# Patient Record
Sex: FEMALE | Race: WHITE | Employment: OTHER | URBAN - METROPOLITAN AREA
[De-identification: names, ages, dates, MRNs, and addresses within clinical notes are randomized per-mention and may not be internally consistent; named-entity substitution may affect disease eponyms.]

---

## 2017-03-23 ENCOUNTER — HOSPITAL ENCOUNTER (OUTPATIENT)
Dept: MAMMOGRAPHY | Age: 77
Discharge: HOME | End: 2017-03-23
Attending: INTERNAL MEDICINE
Payer: MEDICARE

## 2017-03-23 DIAGNOSIS — Z12.31 VISIT FOR SCREENING MAMMOGRAM: ICD-10-CM

## 2017-03-23 PROCEDURE — 77067 SCR MAMMO BI INCL CAD: CPT

## 2017-08-03 PROBLEM — H40.9 GLAUCOMA: Status: ACTIVE | Noted: 2017-08-03

## 2017-08-03 PROBLEM — M16.10 ARTHRITIS, HIP: Status: ACTIVE | Noted: 2017-08-03

## 2017-08-03 PROBLEM — R51.9 HEADACHE, ACUTE: Status: ACTIVE | Noted: 2017-08-03

## 2017-08-03 PROBLEM — L40.9 PSORIASIS: Status: ACTIVE | Noted: 2017-08-03

## 2017-08-03 PROBLEM — K58.9 IBS (IRRITABLE BOWEL SYNDROME): Status: ACTIVE | Noted: 2017-08-03

## 2017-08-03 PROBLEM — R79.89 ELEVATED LIVER FUNCTION TESTS: Status: ACTIVE | Noted: 2017-08-03

## 2017-08-03 PROBLEM — M85.80 BONE LOSS: Status: ACTIVE | Noted: 2017-08-03

## 2017-08-03 PROBLEM — K92.1 MELENA: Status: ACTIVE | Noted: 2017-08-03

## 2017-08-03 PROBLEM — L50.9 URTICARIA: Status: ACTIVE | Noted: 2017-08-03

## 2017-08-03 PROBLEM — F41.9 ANXIETY: Status: ACTIVE | Noted: 2017-08-03

## 2017-08-03 PROBLEM — Z78.0 POST-MENOPAUSAL: Status: ACTIVE | Noted: 2017-08-03

## 2017-08-03 PROBLEM — E03.9 HYPOTHYROID: Status: ACTIVE | Noted: 2017-08-03

## 2017-08-03 PROBLEM — Z86.010 HISTORY OF COLONOSCOPY WITH POLYPECTOMY: Status: ACTIVE | Noted: 2017-08-03

## 2017-08-03 PROBLEM — G47.00 INSOMNIA: Status: ACTIVE | Noted: 2017-08-03

## 2017-08-03 PROBLEM — M54.30 SCIATICA: Status: ACTIVE | Noted: 2017-08-03

## 2017-08-03 PROBLEM — Z98.890 HISTORY OF COLONOSCOPY WITH POLYPECTOMY: Status: ACTIVE | Noted: 2017-08-03

## 2017-08-03 PROBLEM — Z00.00 ANNUAL PHYSICAL EXAM: Status: ACTIVE | Noted: 2017-08-03

## 2017-08-03 PROBLEM — M81.0 OSTEOPOROSIS: Status: ACTIVE | Noted: 2017-08-03

## 2017-08-03 PROBLEM — F41.1 GAD (GENERALIZED ANXIETY DISORDER): Status: ACTIVE | Noted: 2017-08-03

## 2017-08-03 PROBLEM — M47.816 DEGENERATIVE ARTHRITIS OF LUMBAR SPINE: Status: ACTIVE | Noted: 2017-08-03

## 2017-08-03 PROBLEM — M99.01 SOMATIC DYSFUNCTION OF CERVICAL REGION: Status: ACTIVE | Noted: 2017-08-03

## 2017-08-03 PROBLEM — Z79.60 LONG-TERM USE OF IMMUNOSUPPRESSANT MEDICATION: Status: ACTIVE | Noted: 2017-08-03

## 2017-08-03 PROBLEM — T88.7XXA MEDICATION SIDE EFFECT: Status: ACTIVE | Noted: 2017-08-03

## 2017-08-03 PROBLEM — H10.829 ROSACEA BLEPHAROCONJUNCTIVITIS: Status: ACTIVE | Noted: 2017-08-03

## 2017-08-03 PROBLEM — M62.838 MUSCLE SPASM: Status: ACTIVE | Noted: 2017-08-03

## 2017-08-03 PROBLEM — S80.02XA CONTUSION OF LEFT KNEE: Status: ACTIVE | Noted: 2017-08-03

## 2017-08-03 PROBLEM — M87.059 AVASCULAR NECROSIS OF FEMORAL HEAD (HCC): Status: ACTIVE | Noted: 2017-08-03

## 2017-08-03 PROBLEM — D50.9 IDA (IRON DEFICIENCY ANEMIA): Status: ACTIVE | Noted: 2017-08-03

## 2017-08-03 PROBLEM — Z85.42 HISTORY OF ENDOMETRIAL CANCER: Status: ACTIVE | Noted: 2017-08-03

## 2017-08-03 PROBLEM — B37.0 THRUSH: Status: ACTIVE | Noted: 2017-08-03

## 2017-08-03 PROBLEM — M47.812 SPONDYLOSIS OF CERVICAL REGION WITHOUT MYELOPATHY OR RADICULOPATHY: Status: ACTIVE | Noted: 2017-08-03

## 2017-08-03 PROBLEM — R21 RASH: Status: ACTIVE | Noted: 2017-08-03

## 2017-08-03 PROBLEM — E78.5 HYPERLIPIDEMIA: Status: ACTIVE | Noted: 2017-08-03

## 2017-08-03 PROBLEM — M25.50 ARTHRALGIA: Status: ACTIVE | Noted: 2017-08-03

## 2017-08-03 PROBLEM — R82.81 PYURIA, STERILE: Status: ACTIVE | Noted: 2017-08-03

## 2017-08-03 RX ORDER — CHOLECALCIFEROL (VITAMIN D3) 125 MCG
CAPSULE ORAL
COMMUNITY
End: 2019-06-11

## 2017-08-03 RX ORDER — CLOTRIMAZOLE 10 MG/1
10 LOZENGE ORAL; TOPICAL
COMMUNITY
End: 2017-10-09 | Stop reason: SDUPTHER

## 2017-08-03 RX ORDER — TRIAMCINOLONE ACETONIDE 1 MG/G
CREAM TOPICAL 2 TIMES DAILY
COMMUNITY
End: 2019-06-11

## 2017-08-03 RX ORDER — DICYCLOMINE HYDROCHLORIDE 10 MG/1
10 CAPSULE ORAL
COMMUNITY
End: 2018-01-13 | Stop reason: SDUPTHER

## 2017-08-03 RX ORDER — NAPROXEN SODIUM 220 MG
220 TABLET ORAL 2 TIMES DAILY WITH MEALS
COMMUNITY
End: 2021-08-11

## 2017-08-08 ENCOUNTER — LAB ONLY (OUTPATIENT)
Dept: INTERNAL MEDICINE CLINIC | Age: 77
End: 2017-08-08

## 2017-08-08 DIAGNOSIS — L40.52 PSORIATIC ARTHRITIS MUTILANS (HCC): Primary | ICD-10-CM

## 2017-08-08 LAB
ALBUMIN SERPL-MCNC: 3.5 G/DL (ref 3.9–5.4)
ALKALINE PHOS POC: 79 U/L (ref 38–126)
ALT SERPL-CCNC: 45 U/L (ref 9–52)
AST SERPL-CCNC: 41 U/L (ref 14–36)
BUN BLD-MCNC: 22 MG/DL (ref 7–17)
CALCIUM BLD-MCNC: 9.4 MG/DL (ref 8.4–10.2)
CHLORIDE BLD-SCNC: 108 MMOL/L (ref 98–107)
CO2 POC: 25 MMOL/L (ref 22–32)
CREAT BLD-MCNC: 0.8 MG/DL (ref 0.7–1.2)
EGFR (POC): 70.1
GLUCOSE POC: 78 MG/DL (ref 65–105)
GRAN# POC: 3.8 K/UL (ref 2–7.8)
GRAN% POC: 71.6 % (ref 37–92)
HCT VFR BLD CALC: 41.3 % (ref 37–51)
HGB BLD-MCNC: 13.9 G/DL (ref 12–18)
LY# POC: 1.2 K/UL (ref 0.6–4.1)
LY% POC: 24.2 % (ref 10–58.5)
MCH RBC QN: 32 PG (ref 26–32)
MCHC RBC-ENTMCNC: 33.5 G/DL (ref 30–36)
MCV RBC: 95 FL (ref 80–97)
MID #, POC: 0.2 K/UL (ref 0–1.8)
MID% POC: 4.2 % (ref 0.1–24)
PLATELET # BLD: 202 K/UL (ref 140–440)
POTASSIUM SERPL-SCNC: 4.5 MMOL/L (ref 3.6–5)
PROT SERPL-MCNC: 5.8 G/DL (ref 6.3–8.2)
RBC # BLD: 4.33 M/UL (ref 4.2–6.3)
SODIUM SERPL-SCNC: 144 MMOL/L (ref 137–145)
TOTAL BILIRUBIN POC: 0.7 MG/DL (ref 0.2–1.3)
WBC # BLD: 5.2 K/UL (ref 4.1–10.9)

## 2017-08-24 ENCOUNTER — OFFICE VISIT (OUTPATIENT)
Dept: INTERNAL MEDICINE CLINIC | Age: 77
End: 2017-08-24

## 2017-08-24 VITALS — SYSTOLIC BLOOD PRESSURE: 120 MMHG | TEMPERATURE: 98 F | DIASTOLIC BLOOD PRESSURE: 70 MMHG

## 2017-08-24 DIAGNOSIS — E78.5 DYSLIPIDEMIA: ICD-10-CM

## 2017-08-24 DIAGNOSIS — H10.829 ROSACEA BLEPHAROCONJUNCTIVITIS: Primary | ICD-10-CM

## 2017-08-24 DIAGNOSIS — H60.8X3 CHRONIC ECZEMATOUS OTITIS EXTERNA OF BOTH EARS: ICD-10-CM

## 2017-08-24 PROBLEM — B37.31 MONILIAL VAGINITIS: Status: ACTIVE | Noted: 2017-08-24

## 2017-08-24 RX ORDER — PREDNISOLONE ACETATE 10 MG/ML
1 SUSPENSION/ DROPS OPHTHALMIC 4 TIMES DAILY
Qty: 10 ML | Refills: 6 | Status: SHIPPED | OUTPATIENT
Start: 2017-08-24 | End: 2019-06-11

## 2017-08-24 RX ORDER — ATORVASTATIN CALCIUM 40 MG/1
40 TABLET, FILM COATED ORAL DAILY
Qty: 90 TAB | Refills: 3 | Status: SHIPPED | OUTPATIENT
Start: 2017-08-24 | End: 2018-08-27 | Stop reason: SDUPTHER

## 2017-08-24 RX ORDER — DOXYCYCLINE 100 MG/1
100 CAPSULE ORAL 2 TIMES DAILY
Qty: 30 CAP | Refills: 0 | Status: SHIPPED | OUTPATIENT
Start: 2017-08-24 | End: 2019-01-10 | Stop reason: ALTCHOICE

## 2017-08-24 RX ORDER — FLUCONAZOLE 150 MG/1
150 TABLET ORAL DAILY
Qty: 2 TAB | Refills: 3 | Status: SHIPPED | OUTPATIENT
Start: 2017-08-24 | End: 2017-08-26

## 2017-08-24 RX ORDER — NEOMYCIN SULFATE, POLYMYXIN B SULFATE AND HYDROCORTISONE 10; 3.5; 1 MG/ML; MG/ML; [USP'U]/ML
4 SUSPENSION/ DROPS AURICULAR (OTIC) 3 TIMES DAILY
Qty: 10 ML | Refills: 6 | Status: SHIPPED | OUTPATIENT
Start: 2017-08-24 | End: 2019-06-11

## 2017-08-24 NOTE — PROGRESS NOTES
Nadine comes to the office today complaining of:  1. Itching in ears. 2. Itching in and around her eyes. 3. Itching all over. She does not have a rash. The majority of her symptoms are the ears and the eyes. Physical Examination:  GENERAL:  T: 98.  BP: 120/70. HEENT:  Ears show erythema and scaling in the external auditory canals consistent with chronic external otitis. Eyes show erythematous scaling rash in the periorbital areas and mild conjunctival injection. Impression:  1. Chronic eczematous external otitis. 2. Rosacea. 3. Blepharoconjunctivitis. Plan:  1. Cortaid cream around the eyes t.i.d. prn.  2. Cortisporin otic solution drops in the ear prn. 3. Pred Forte drops in the eyes prn.  4. Doxycycline 100 mg daily for 30 days. 5. Follow up if unimproved, otherwise as previously scheduled.

## 2017-10-09 ENCOUNTER — OFFICE VISIT (OUTPATIENT)
Dept: INTERNAL MEDICINE CLINIC | Age: 77
End: 2017-10-09

## 2017-10-09 VITALS
BODY MASS INDEX: 24.74 KG/M2 | TEMPERATURE: 97.8 F | RESPIRATION RATE: 14 BRPM | DIASTOLIC BLOOD PRESSURE: 72 MMHG | WEIGHT: 126 LBS | SYSTOLIC BLOOD PRESSURE: 134 MMHG | HEIGHT: 60 IN | HEART RATE: 66 BPM | OXYGEN SATURATION: 97 %

## 2017-10-09 DIAGNOSIS — H10.829 ROSACEA BLEPHAROCONJUNCTIVITIS: Primary | ICD-10-CM

## 2017-10-09 DIAGNOSIS — Z23 ENCOUNTER FOR IMMUNIZATION: ICD-10-CM

## 2017-10-09 DIAGNOSIS — H60.8X3 CHRONIC ECZEMATOUS OTITIS EXTERNA OF BOTH EARS: ICD-10-CM

## 2017-10-09 DIAGNOSIS — L50.9 URTICARIA: ICD-10-CM

## 2017-10-09 DIAGNOSIS — B37.0 THRUSH: ICD-10-CM

## 2017-10-09 RX ORDER — CLOTRIMAZOLE 10 MG/1
10 LOZENGE ORAL; TOPICAL
Qty: 70 TAB | Refills: 2 | Status: SHIPPED | OUTPATIENT
Start: 2017-10-09 | End: 2019-06-11

## 2017-10-09 NOTE — PROGRESS NOTES
Chief Complaint   Patient presents with    Skin Problem     pt c/o itching all over her body. pt seen dermatology (Dr. Demetrius Delaney) on last Friday    Headache     pt c/o frontal headache daily.

## 2017-10-09 NOTE — PROGRESS NOTES
Nadine comes to the office today with continued problems with pruritus. She has rosacea in her eyes, which I've treated with Doxycycline, Cortaid cream and Pred Forte eyedrops. She has chronic eczematous external otitis and has diffuse idiopathic pruritus. The Cortisporin Otic has helped her ears, Pred Forte has helped her eyes, Doxycycline has given her thrush, the Cortaid hasn't seemed to help much around her eyes. She saw her dermatologist last week, who put her on Doxepin 10 mg at bedtime. That hasn't helped either. Physical Examination:  GENERAL:  T: 97.8. BP: 134/72. P: 66 and regular. O2 sat on room air: 97%. HEENT:  Ears show mild bilateral chronic eczematoid dermatitis. Her eyes look much better and are no longer inflamed or injected. The other areas where she's having intolerable pruritus shows no evidence of a rash except in the left axilla and she is using betamethasone on that. Plan:  1. She is already taking Zyrtec 10 mg daily. 2. I advised her to increase the Doxepin to 20 mg at bedtime. 3. I've advised Aveeno oatmeal baths every night, followed by alpha jaky oil to the skin. 4. Follow up as scheduled or sooner prn.  5. Flu vaccine given today.

## 2017-10-12 RX ORDER — DOXEPIN HYDROCHLORIDE 10 MG/1
20 CAPSULE ORAL
Qty: 60 CAP | Refills: 3 | Status: SHIPPED | OUTPATIENT
Start: 2017-10-12 | End: 2018-04-26 | Stop reason: SDUPTHER

## 2017-10-18 RX ORDER — FOLIC ACID 1 MG/1
1 TABLET ORAL DAILY
Qty: 100 TAB | Refills: 3 | Status: SHIPPED | OUTPATIENT
Start: 2017-10-18 | End: 2018-08-13 | Stop reason: SDUPTHER

## 2017-10-18 NOTE — TELEPHONE ENCOUNTER
Requested Prescriptions     Pending Prescriptions Disp Refills    folic acid (FOLVITE) 1 mg tablet       Sig: Take  by mouth daily.

## 2017-10-24 ENCOUNTER — OFFICE VISIT (OUTPATIENT)
Dept: INTERNAL MEDICINE CLINIC | Age: 77
End: 2017-10-24

## 2017-10-24 VITALS
OXYGEN SATURATION: 95 % | DIASTOLIC BLOOD PRESSURE: 75 MMHG | TEMPERATURE: 97.8 F | HEART RATE: 70 BPM | WEIGHT: 128 LBS | SYSTOLIC BLOOD PRESSURE: 123 MMHG | BODY MASS INDEX: 25 KG/M2

## 2017-10-24 DIAGNOSIS — M24.159 LABRAL TEAR OF HIP, DEGENERATIVE: Primary | ICD-10-CM

## 2017-10-24 PROBLEM — M16.11 ARTHRITIS OF RIGHT HIP: Status: ACTIVE | Noted: 2017-08-03

## 2017-10-24 NOTE — PROGRESS NOTES
Nadine comes to the office today complaining of left hip pain. She had a party for 40 people on Sunday and was standing up all day long. She has a known mild osteoarthritis of the hip and torn left labrum. She's been taking two Aleve every morning. Physical Examination:  GENERAL:  T: 97.8. BP: 123/75. P: 70 and regular. O2 sat on room air: 95%. MUSCULOSKELETAL:  She walks with a limp and has limited motion at the hip joint. Impression:  1. Torn labrum, left hip. Plan:  1. I have referred her for a fluoroscopically guided intraarticular steroid injection in the left hip. If that doesn't relieve her symptoms I recommended hip replacement. 2. Increase Aleve to two b.i.d.  3. Follow up as scheduled.

## 2017-10-24 NOTE — PROGRESS NOTES
Reviewed record in preparation for visit and have obtained necessary documentation. Identified pt with two pt identifiers(name and ). Chief Complaint   Patient presents with    Hip Pain        Coordination of Care Questionnaire:  :     1) Have you been to an emergency room, urgent care clinic since your last visit? No     Hospitalized since your last visit? No               2) Have you seen or consulted any other health care providers outside of [address] [place] [address] since your last visit?  No       Complaining of left hip pain

## 2017-10-25 ENCOUNTER — OFFICE VISIT (OUTPATIENT)
Dept: INTERNAL MEDICINE CLINIC | Age: 77
End: 2017-10-25

## 2017-10-25 VITALS
HEART RATE: 68 BPM | WEIGHT: 128 LBS | TEMPERATURE: 98 F | DIASTOLIC BLOOD PRESSURE: 72 MMHG | BODY MASS INDEX: 25.13 KG/M2 | SYSTOLIC BLOOD PRESSURE: 138 MMHG | HEIGHT: 60 IN

## 2017-10-25 DIAGNOSIS — B02.9 HERPES ZOSTER WITHOUT COMPLICATION: Primary | ICD-10-CM

## 2017-10-25 RX ORDER — VALACYCLOVIR HYDROCHLORIDE 1 G/1
1000 TABLET, FILM COATED ORAL 3 TIMES DAILY
Qty: 21 TAB | Refills: 0 | Status: SHIPPED | OUTPATIENT
Start: 2017-10-25 | End: 2019-06-11

## 2017-10-25 NOTE — PROGRESS NOTES
Reviewed record in preparation for visit and have obtained necessary documentation. Identified pt with two pt identifiers(name and ). Chief Complaint   Patient presents with    Rash     on left side of abdomen        Coordination of Care Questionnaire:  :     1) Have you been to an emergency room, urgent care clinic since your last visit? No    Hospitalized since your last visit? No              2) Have you seen or consulted any other health care providers outside of [address] [place] [address] since your last visit?  No

## 2017-10-25 NOTE — PROGRESS NOTES
Nadine comes to the office today with worsening left hip pain and a rash that has formed over her left flank. Physical Examination:  GENERAL:  T: 98.  BP: 138/72. P: 68 and regular. SKIN:  The area in question shows an early vesicular rash on a raised erythematous base, consistent with herpes zoster. Impression:  1. Left hip pain due to herpes zoster. Plan:  1. Valtrex 1 gram t.i.d., (#21). 2. She declines anything for pain. 3. I told her that because she is on Methotrexate she's at risk for dissemination and she should contact me immediately should lesions develop outside of this dermatome. 4. We will discontinue the hip injection that we scheduled yesterday.

## 2017-11-27 ENCOUNTER — OFFICE VISIT (OUTPATIENT)
Dept: INTERNAL MEDICINE CLINIC | Age: 77
End: 2017-11-27

## 2017-11-27 VITALS
TEMPERATURE: 98.4 F | OXYGEN SATURATION: 97 % | WEIGHT: 121 LBS | DIASTOLIC BLOOD PRESSURE: 75 MMHG | HEART RATE: 66 BPM | BODY MASS INDEX: 23.63 KG/M2 | SYSTOLIC BLOOD PRESSURE: 126 MMHG

## 2017-11-27 DIAGNOSIS — K58.0 IRRITABLE BOWEL SYNDROME WITH DIARRHEA: ICD-10-CM

## 2017-11-27 DIAGNOSIS — R79.89 ELEVATED LIVER FUNCTION TESTS: ICD-10-CM

## 2017-11-27 DIAGNOSIS — R10.13 EPIGASTRIC PAIN: Primary | ICD-10-CM

## 2017-11-27 DIAGNOSIS — K21.9 GASTROESOPHAGEAL REFLUX DISEASE, ESOPHAGITIS PRESENCE NOT SPECIFIED: ICD-10-CM

## 2017-11-27 LAB
ALBUMIN SERPL-MCNC: 3.6 G/DL (ref 3.9–5.4)
ALKALINE PHOS POC: 87 U/L (ref 38–126)
ALT SERPL-CCNC: 33 U/L (ref 9–52)
AMYLASE, POCT, AMYLPOCT: 79 U/L (ref 30–100)
AST SERPL-CCNC: 31 U/L (ref 14–36)
BUN BLD-MCNC: 24 MG/DL (ref 7–17)
CALCIUM BLD-MCNC: 9.6 MG/DL (ref 8.4–10.2)
CHLORIDE BLD-SCNC: 108 MMOL/L (ref 98–107)
CO2 POC: 26 MMOL/L (ref 22–32)
CREAT BLD-MCNC: 0.8 MG/DL (ref 0.7–1.2)
EGFR (POC): 70.1
GLUCOSE POC: 81 MG/DL (ref 65–105)
GRAN# POC: 4.5 K/UL (ref 2–7.8)
GRAN% POC: 77.7 % (ref 37–92)
HCT VFR BLD CALC: 38.3 % (ref 37–51)
HGB BLD-MCNC: 13.2 G/DL (ref 12–18)
LY# POC: 1 K/UL (ref 0.6–4.1)
LY% POC: 18.4 % (ref 10–58.5)
MCH RBC QN: 32.3 PG (ref 26–32)
MCHC RBC-ENTMCNC: 34.5 G/DL (ref 30–36)
MCV RBC: 94 FL (ref 80–97)
MID #, POC: 0.2 K/UL (ref 0–1.8)
MID% POC: 3.9 % (ref 0.1–24)
PLATELET # BLD: 194 K/UL (ref 140–440)
POTASSIUM SERPL-SCNC: 4.6 MMOL/L (ref 3.6–5)
PROT SERPL-MCNC: 6.2 G/DL (ref 6.3–8.2)
RBC # BLD: 4.1 M/UL (ref 4.2–6.3)
SODIUM SERPL-SCNC: 140 MMOL/L (ref 137–145)
TOTAL BILIRUBIN POC: 0.4 MG/DL (ref 0.2–1.3)
WBC # BLD: 5.7 K/UL (ref 4.1–10.9)

## 2017-11-27 NOTE — PROGRESS NOTES
Reviewed record in preparation for visit and have obtained necessary documentation. Identified pt with two pt identifiers(name and ). Chief Complaint   Patient presents with    Abdominal Pain        Coordination of Care Questionnaire:  :     1) Have you been to an emergency room, urgent care clinic since your last visit? No     Hospitalized since your last visit? No             2) Have you seen or consulted any other health care providers outside of [address] [place] [address] since your last visit?  No     Patient states does not feel like food is going down

## 2017-11-28 ENCOUNTER — HOSPITAL ENCOUNTER (OUTPATIENT)
Dept: CT IMAGING | Age: 77
Discharge: HOME | End: 2017-11-28
Attending: INTERNAL MEDICINE
Payer: MEDICARE

## 2017-11-28 DIAGNOSIS — K21.9 GASTROESOPHAGEAL REFLUX DISEASE, ESOPHAGITIS PRESENCE NOT SPECIFIED: ICD-10-CM

## 2017-11-28 DIAGNOSIS — K58.0 IRRITABLE BOWEL SYNDROME WITH DIARRHEA: ICD-10-CM

## 2017-11-28 DIAGNOSIS — R10.13 EPIGASTRIC PAIN: ICD-10-CM

## 2017-11-28 DIAGNOSIS — R10.13 EPIGASTRIC PAIN: Primary | ICD-10-CM

## 2017-11-28 DIAGNOSIS — R79.89 ELEVATED LIVER FUNCTION TESTS: ICD-10-CM

## 2017-11-28 LAB — LIPASE SERPL-CCNC: 19 U/L (ref 14–85)

## 2017-11-28 PROCEDURE — 82565 ASSAY OF CREATININE: CPT

## 2017-11-28 PROCEDURE — 74011636320 HC RX REV CODE- 636/320: Performed by: INTERNAL MEDICINE

## 2017-11-28 PROCEDURE — 74011250636 HC RX REV CODE- 250/636: Performed by: INTERNAL MEDICINE

## 2017-11-28 PROCEDURE — 72194 CT PELVIS W/O & W/DYE: CPT

## 2017-11-28 PROCEDURE — 74011000255 HC RX REV CODE- 255: Performed by: INTERNAL MEDICINE

## 2017-11-28 PROCEDURE — 74177 CT ABD & PELVIS W/CONTRAST: CPT

## 2017-11-28 RX ORDER — BARIUM SULFATE 20 MG/ML
900 SUSPENSION ORAL
Status: COMPLETED | OUTPATIENT
Start: 2017-11-28 | End: 2017-11-28

## 2017-11-28 RX ORDER — SODIUM CHLORIDE 0.9 % (FLUSH) 0.9 %
10 SYRINGE (ML) INJECTION
Status: COMPLETED | OUTPATIENT
Start: 2017-11-28 | End: 2017-11-28

## 2017-11-28 RX ORDER — SODIUM CHLORIDE 9 MG/ML
50 INJECTION, SOLUTION INTRAVENOUS
Status: COMPLETED | OUTPATIENT
Start: 2017-11-28 | End: 2017-11-28

## 2017-11-28 RX ADMIN — Medication 10 ML: at 15:29

## 2017-11-28 RX ADMIN — IOPAMIDOL 100 ML: 755 INJECTION, SOLUTION INTRAVENOUS at 15:29

## 2017-11-28 RX ADMIN — BARIUM SULFATE 900 ML: 21 SUSPENSION ORAL at 15:29

## 2017-11-28 RX ADMIN — SODIUM CHLORIDE 50 ML/HR: 900 INJECTION, SOLUTION INTRAVENOUS at 15:29

## 2017-11-29 DIAGNOSIS — R19.7 DIARRHEA, UNSPECIFIED TYPE: Primary | ICD-10-CM

## 2017-11-29 LAB — CREAT BLD-MCNC: 0.9 MG/DL (ref 0.6–1.3)

## 2017-11-29 RX ORDER — DIPHENOXYLATE HYDROCHLORIDE AND ATROPINE SULFATE 2.5; .025 MG/1; MG/1
1 TABLET ORAL
Qty: 40 TAB | Refills: 0 | Status: SHIPPED | OUTPATIENT
Start: 2017-11-29 | End: 2019-09-09 | Stop reason: SDUPTHER

## 2017-12-01 ENCOUNTER — HOSPITAL ENCOUNTER (OUTPATIENT)
Dept: MRI IMAGING | Age: 77
Discharge: HOME | End: 2017-12-01
Attending: INTERNAL MEDICINE
Payer: MEDICARE

## 2017-12-01 DIAGNOSIS — R10.13 EPIGASTRIC PAIN: ICD-10-CM

## 2017-12-01 PROCEDURE — A9577 INJ MULTIHANCE: HCPCS | Performed by: INTERNAL MEDICINE

## 2017-12-01 PROCEDURE — 74011250636 HC RX REV CODE- 250/636: Performed by: INTERNAL MEDICINE

## 2017-12-01 PROCEDURE — 74183 MRI ABD W/O CNTR FLWD CNTR: CPT

## 2017-12-01 RX ADMIN — GADOBENATE DIMEGLUMINE 11 ML: 529 INJECTION, SOLUTION INTRAVENOUS at 16:18

## 2017-12-09 DIAGNOSIS — N30.00 ACUTE CYSTITIS WITHOUT HEMATURIA: Primary | ICD-10-CM

## 2017-12-09 RX ORDER — CIPROFLOXACIN 500 MG/1
500 TABLET ORAL 2 TIMES DAILY
Qty: 14 TAB | Refills: 0 | Status: SHIPPED | OUTPATIENT
Start: 2017-12-09 | End: 2018-03-19 | Stop reason: SDUPTHER

## 2017-12-21 ENCOUNTER — LAB ONLY (OUTPATIENT)
Dept: INTERNAL MEDICINE CLINIC | Age: 77
End: 2017-12-21

## 2017-12-21 DIAGNOSIS — N39.0 URINARY TRACT INFECTION WITHOUT HEMATURIA, SITE UNSPECIFIED: Primary | ICD-10-CM

## 2017-12-21 LAB
BACTERIA UA POCT, BACTPOCT: NORMAL
BILIRUB UR QL STRIP: NEGATIVE
CASTS UA POCT: NORMAL
CLUE CELLS, CLUEPOCT: NEGATIVE
CRYSTALS UA POCT, CRYSPOCT: NEGATIVE
EPITHELIAL CELLS POCT: NORMAL
GLUCOSE UR-MCNC: NEGATIVE MG/DL
KETONES P FAST UR STRIP-MCNC: NEGATIVE MG/DL
MUCUS UA POCT, MUCPOCT: NORMAL
PH UR STRIP: 5 [PH] (ref 5–7)
PROT UR QL STRIP: NEGATIVE
RBC UA POCT, RBCPOCT: NORMAL
SP GR UR STRIP: 1.01 (ref 1.01–1.02)
TRICH UA POCT, TRICHPOC: NEGATIVE
UA UROBILINOGEN AMB POC: NORMAL (ref 0.2–1)
URINALYSIS CLARITY POC: NORMAL
URINALYSIS COLOR POC: NORMAL
URINE BLOOD POC: NEGATIVE
URINE CULT COMMENT, POCT: NORMAL
URINE LEUKOCYTES POC: NORMAL
URINE NITRITES POC: NEGATIVE
WBC UA POCT, WBCPOCT: NORMAL
YEAST UA POCT, YEASTPOC: NEGATIVE

## 2017-12-22 LAB — BACTERIA UR CULT: NO GROWTH

## 2017-12-28 RX ORDER — ESTROGENS, CONJUGATED 0.62 MG/1
TABLET, FILM COATED ORAL
Qty: 90 TAB | Refills: 2 | Status: SHIPPED | OUTPATIENT
Start: 2017-12-28 | End: 2021-08-11 | Stop reason: ALTCHOICE

## 2018-01-15 RX ORDER — DICYCLOMINE HYDROCHLORIDE 10 MG/1
CAPSULE ORAL
Qty: 120 CAP | Refills: 3 | Status: SHIPPED | OUTPATIENT
Start: 2018-01-15 | End: 2020-08-21 | Stop reason: SDUPTHER

## 2018-01-29 RX ORDER — LEVOTHYROXINE SODIUM 88 UG/1
TABLET ORAL
Qty: 90 TAB | Refills: 2 | Status: SHIPPED | OUTPATIENT
Start: 2018-01-29 | End: 2018-11-05 | Stop reason: SDUPTHER

## 2018-02-23 ENCOUNTER — OFFICE VISIT (OUTPATIENT)
Dept: INTERNAL MEDICINE CLINIC | Age: 78
End: 2018-02-23

## 2018-02-23 VITALS
WEIGHT: 121 LBS | TEMPERATURE: 97.5 F | SYSTOLIC BLOOD PRESSURE: 126 MMHG | OXYGEN SATURATION: 97 % | HEIGHT: 60 IN | HEART RATE: 60 BPM | BODY MASS INDEX: 23.75 KG/M2 | DIASTOLIC BLOOD PRESSURE: 75 MMHG

## 2018-02-23 DIAGNOSIS — J01.41 ACUTE RECURRENT PANSINUSITIS: Primary | ICD-10-CM

## 2018-02-23 RX ORDER — DOXYCYCLINE HYCLATE 100 MG
100 TABLET ORAL 2 TIMES DAILY
Qty: 20 TAB | Refills: 0 | Status: SHIPPED | OUTPATIENT
Start: 2018-02-23 | End: 2019-01-10 | Stop reason: ALTCHOICE

## 2018-02-23 NOTE — PROGRESS NOTES
Subjective:  Nadine comes to the office today complaining of several days of cough, sinus drainage, headache and malaise. She's not had fever or chills. Physical Examination:  GENERAL:  T: 97.5. BP: 126/75. P: 60 and regular. O2 sat on room air: 97%. HEENT:  Ears are clear. Nose occluded with mucus and edema. Throat normal.  NECK:  Without adenopathy or stridor. CHEST:  Lungs clear. CARDIAC:  Heart regular rhythm without murmurs or gallops. Impression:  1. Acute sinusitis. Plan:  1. She has been using saline lavages of her sinuses. I encouraged her to continue that. 2. Add Doxycycline 100 mg b.i.d. for ten days. 3. Follow up if unimproved, otherwise as previously scheduled.

## 2018-02-23 NOTE — PROGRESS NOTES
Reviewed record in preparation for visit and have obtained necessary documentation. Identified pt with two pt identifiers(name and ).     Chief Complaint   Patient presents with    Sinus Infection        Coordination of Care Questionnaire:  :     1) Have you been to an emergency room, urgent care clinic since your last visit? no    Hospitalized since your last visit? no              2) Have you seen or consulted any other health care providers outside of [address] [place] [address] since your last visit? no

## 2018-03-19 ENCOUNTER — LAB ONLY (OUTPATIENT)
Dept: INTERNAL MEDICINE CLINIC | Age: 78
End: 2018-03-19

## 2018-03-19 DIAGNOSIS — N30.00 ACUTE CYSTITIS WITHOUT HEMATURIA: ICD-10-CM

## 2018-03-19 DIAGNOSIS — N30.00 ACUTE CYSTITIS WITHOUT HEMATURIA: Primary | ICD-10-CM

## 2018-03-19 LAB
BACTERIA UA POCT, BACTPOCT: ABNORMAL
BILIRUB UR QL STRIP: ABNORMAL
CASTS UA POCT: ABNORMAL
CLUE CELLS, CLUEPOCT: NEGATIVE
CRYSTALS UA POCT, CRYSPOCT: NEGATIVE
EPITHELIAL CELLS POCT: ABNORMAL
GLUCOSE UR-MCNC: NEGATIVE MG/DL
KETONES P FAST UR STRIP-MCNC: NEGATIVE MG/DL
MUCUS UA POCT, MUCPOCT: ABNORMAL
PH UR STRIP: 6 [PH] (ref 5–7)
PROT UR QL STRIP: NEGATIVE
RBC UA POCT, RBCPOCT: ABNORMAL
SP GR UR STRIP: 1.01 (ref 1.01–1.02)
TRICH UA POCT, TRICHPOC: NEGATIVE
UA UROBILINOGEN AMB POC: ABNORMAL (ref 0.2–1)
URINALYSIS CLARITY POC: ABNORMAL
URINALYSIS COLOR POC: ABNORMAL
URINE BLOOD POC: ABNORMAL
URINE CULT COMMENT, POCT: ABNORMAL
URINE LEUKOCYTES POC: ABNORMAL
URINE NITRITES POC: POSITIVE
WBC UA POCT, WBCPOCT: ABNORMAL
YEAST UA POCT, YEASTPOC: NEGATIVE

## 2018-03-19 RX ORDER — CIPROFLOXACIN 500 MG/1
500 TABLET ORAL 2 TIMES DAILY
Qty: 14 TAB | Refills: 0 | Status: SHIPPED | OUTPATIENT
Start: 2018-03-19 | End: 2019-01-10 | Stop reason: ALTCHOICE

## 2018-03-19 NOTE — TELEPHONE ENCOUNTER
Requested Prescriptions     Signed Prescriptions Disp Refills    ciprofloxacin HCl (CIPRO) 500 mg tablet 14 Tab 0     Sig: Take 1 Tab by mouth two (2) times a day.      Authorizing Provider: Enma Gibson

## 2018-03-21 LAB — BACTERIA UR CULT: NORMAL

## 2018-04-03 ENCOUNTER — LAB ONLY (OUTPATIENT)
Dept: INTERNAL MEDICINE CLINIC | Age: 78
End: 2018-04-03

## 2018-04-03 DIAGNOSIS — N30.00 ACUTE CYSTITIS WITHOUT HEMATURIA: Primary | ICD-10-CM

## 2018-04-03 LAB
BACTERIA UA POCT, BACTPOCT: NORMAL
BILIRUB UR QL STRIP: NEGATIVE
CASTS UA POCT: NORMAL
CLUE CELLS, CLUEPOCT: NEGATIVE
CRYSTALS UA POCT, CRYSPOCT: NEGATIVE
EPITHELIAL CELLS POCT: NORMAL
GLUCOSE UR-MCNC: NEGATIVE MG/DL
KETONES P FAST UR STRIP-MCNC: NEGATIVE MG/DL
MUCUS UA POCT, MUCPOCT: NORMAL
PH UR STRIP: 6 [PH] (ref 5–7)
PROT UR QL STRIP: NEGATIVE
RBC UA POCT, RBCPOCT: NORMAL
SP GR UR STRIP: 1.01 (ref 1.01–1.02)
TRICH UA POCT, TRICHPOC: NEGATIVE
UA UROBILINOGEN AMB POC: NORMAL (ref 0.2–1)
URINALYSIS CLARITY POC: CLEAR
URINALYSIS COLOR POC: NORMAL
URINE BLOOD POC: NEGATIVE
URINE CULT COMMENT, POCT: NORMAL
URINE LEUKOCYTES POC: NORMAL
URINE NITRITES POC: NEGATIVE
WBC UA POCT, WBCPOCT: NORMAL
YEAST UA POCT, YEASTPOC: NEGATIVE

## 2018-04-04 LAB — BACTERIA UR CULT: NO GROWTH

## 2018-04-16 RX ORDER — ESCITALOPRAM OXALATE 20 MG/1
TABLET ORAL
Qty: 90 TAB | Refills: 2 | Status: SHIPPED | OUTPATIENT
Start: 2018-04-16 | End: 2019-01-11 | Stop reason: SDUPTHER

## 2018-04-17 ENCOUNTER — HOSPITAL ENCOUNTER (OUTPATIENT)
Dept: MAMMOGRAPHY | Age: 78
Discharge: HOME | End: 2018-04-17
Attending: INTERNAL MEDICINE
Payer: MEDICARE

## 2018-04-17 DIAGNOSIS — Z12.31 VISIT FOR SCREENING MAMMOGRAM: ICD-10-CM

## 2018-04-17 PROCEDURE — 77067 SCR MAMMO BI INCL CAD: CPT

## 2018-04-27 RX ORDER — DOXEPIN HYDROCHLORIDE 10 MG/1
CAPSULE ORAL
Qty: 60 CAP | Refills: 4 | Status: SHIPPED | OUTPATIENT
Start: 2018-04-27 | End: 2018-10-01 | Stop reason: SDUPTHER

## 2018-07-11 ENCOUNTER — LAB ONLY (OUTPATIENT)
Dept: INTERNAL MEDICINE CLINIC | Age: 78
End: 2018-07-11

## 2018-07-11 DIAGNOSIS — R35.0 FREQUENCY OF URINATION: Primary | ICD-10-CM

## 2018-07-11 LAB
BACTERIA UA POCT, BACTPOCT: ABNORMAL
BILIRUB UR QL STRIP: ABNORMAL
CASTS UA POCT: ABNORMAL
CLUE CELLS, CLUEPOCT: NEGATIVE
CRYSTALS UA POCT, CRYSPOCT: NEGATIVE
EPITHELIAL CELLS POCT: ABNORMAL
GLUCOSE UR-MCNC: ABNORMAL MG/DL
KETONES P FAST UR STRIP-MCNC: ABNORMAL MG/DL
MUCUS UA POCT, MUCPOCT: ABNORMAL
PH UR STRIP: 6 [PH] (ref 5–7)
PROT UR QL STRIP: ABNORMAL
RBC UA POCT, RBCPOCT: ABNORMAL
SP GR UR STRIP: 1.01 (ref 1.01–1.02)
TRICH UA POCT, TRICHPOC: NEGATIVE
UA UROBILINOGEN AMB POC: ABNORMAL (ref 0.2–1)
URINALYSIS CLARITY POC: ABNORMAL
URINALYSIS COLOR POC: ABNORMAL
URINE BLOOD POC: ABNORMAL
URINE CULT COMMENT, POCT: ABNORMAL
URINE LEUKOCYTES POC: ABNORMAL
URINE NITRITES POC: ABNORMAL
WBC UA POCT, WBCPOCT: ABNORMAL
YEAST UA POCT, YEASTPOC: NEGATIVE

## 2018-07-11 RX ORDER — CIPROFLOXACIN 250 MG/1
250 TABLET, FILM COATED ORAL 2 TIMES DAILY
Qty: 14 TAB | Refills: 0 | Status: SHIPPED | OUTPATIENT
Start: 2018-07-11 | End: 2018-07-18

## 2018-07-14 LAB
BACTERIA UR CULT: ABNORMAL
BACTERIA UR CULT: ABNORMAL

## 2018-07-22 RX ORDER — METHOTREXATE 2.5 MG/1
TABLET ORAL
Qty: 20 TAB | Refills: 10 | Status: SHIPPED | OUTPATIENT
Start: 2018-07-22 | End: 2019-09-25 | Stop reason: SDUPTHER

## 2018-08-09 ENCOUNTER — OFFICE VISIT (OUTPATIENT)
Dept: INTERNAL MEDICINE CLINIC | Age: 78
End: 2018-08-09

## 2018-08-09 VITALS
SYSTOLIC BLOOD PRESSURE: 132 MMHG | HEART RATE: 61 BPM | OXYGEN SATURATION: 97 % | BODY MASS INDEX: 24.61 KG/M2 | DIASTOLIC BLOOD PRESSURE: 79 MMHG | TEMPERATURE: 98.6 F | WEIGHT: 126 LBS

## 2018-08-09 DIAGNOSIS — K58.0 IRRITABLE BOWEL SYNDROME WITH DIARRHEA: ICD-10-CM

## 2018-08-09 DIAGNOSIS — K21.9 GASTROESOPHAGEAL REFLUX DISEASE, ESOPHAGITIS PRESENCE NOT SPECIFIED: Primary | ICD-10-CM

## 2018-08-09 NOTE — PROGRESS NOTES
Subjective:  Nadine comes to the office today complaining of abdominal discomfort. She's had epigastric discomfort and inability to eat due to gastric distention. She started taking Omeprazole 20 mg daily and that symptom has improved. She also has left lower quadrant pain, cramping and a bowel movement after each meal.  She's not had nausea or vomiting. Previous CT had shown mild sigmoid diverticulosis and this was confirmed by colonoscopy in 2014 by Dr. Marcia Marshall. Physical Examination:  GENERAL:  WT: 126. BP: 132/79. T: 98.6. P: 60 and regular. HEENT:  Unremarkable. Sclerae anicteric. NECK:  Without jugular venous distention. CHEST:  Lungs clear. CARDIAC:  Heart regular rhythm without murmurs or gallops. ABDOMEN:  Soft, mild left lower quadrant to deep palpation. No guarding, rebound, masses or organomegaly are noted. Impression:  1. I suspect this lower abdominal discomfort is irritable bowel. Doubt diverticulitis. 2. GERD. Plan:  1. Continue Prilosec. 2. Add probiotic. 3. Start Benefiber daily. 4. She is appointed for a checkup with full fasting lab work in the near future.   5.    Bentyl tid[AC]

## 2018-08-09 NOTE — PROGRESS NOTES
Reviewed record in preparation for visit and have obtained necessary documentation. Identified pt with two pt identifiers(name and ). Chief Complaint   Patient presents with    Epigastric Pain        Coordination of Care Questionnaire:  :     1) Have you been to an emergency room, urgent care clinic since your last visit? No     Hospitalized since your last visit? No             2) Have you seen or consulted any other health care providers outside of [address] [place] [address] since your last visit?  No

## 2018-08-13 RX ORDER — FOLIC ACID 1 MG/1
TABLET ORAL
Qty: 200 TAB | Refills: 2 | Status: SHIPPED | OUTPATIENT
Start: 2018-08-13 | End: 2019-06-10 | Stop reason: SDUPTHER

## 2018-08-23 ENCOUNTER — LAB ONLY (OUTPATIENT)
Dept: INTERNAL MEDICINE CLINIC | Age: 78
End: 2018-08-23

## 2018-08-23 DIAGNOSIS — Z00.00 ANNUAL PHYSICAL EXAM: ICD-10-CM

## 2018-08-23 DIAGNOSIS — E78.5 DYSLIPIDEMIA: Primary | ICD-10-CM

## 2018-08-23 DIAGNOSIS — E03.9 HYPOTHYROIDISM, UNSPECIFIED TYPE: ICD-10-CM

## 2018-08-23 LAB
ALBUMIN SERPL-MCNC: 3.4 G/DL (ref 3.9–5.4)
ALKALINE PHOS POC: 61 U/L (ref 38–126)
ALT SERPL-CCNC: 35 U/L (ref 9–52)
AST SERPL-CCNC: 28 U/L (ref 14–36)
BACTERIA UA POCT, BACTPOCT: NORMAL
BILIRUB UR QL STRIP: NEGATIVE
BUN BLD-MCNC: 22 MG/DL (ref 7–17)
CALCIUM BLD-MCNC: 9.9 MG/DL (ref 8.4–10.2)
CASTS UA POCT: NORMAL
CHLORIDE BLD-SCNC: 106 MMOL/L (ref 98–107)
CHOLEST SERPL-MCNC: 175 MG/DL (ref 0–200)
CK (CPK) POC: 60 U/L (ref 30–135)
CLUE CELLS, CLUEPOCT: NEGATIVE
CO2 POC: 26 MMOL/L (ref 22–32)
CREAT BLD-MCNC: 0.9 MG/DL (ref 0.7–1.2)
CRYSTALS UA POCT, CRYSPOCT: NEGATIVE
EGFR (POC): 60.4
EPITHELIAL CELLS POCT: NORMAL
GLUCOSE POC: 83 MG/DL (ref 65–105)
GLUCOSE UR-MCNC: NEGATIVE MG/DL
GRAN# POC: 2.9 K/UL (ref 2–7.8)
GRAN% POC: 66.7 % (ref 37–92)
HCT VFR BLD CALC: 39.6 % (ref 37–51)
HDLC SERPL-MCNC: 108 MG/DL (ref 35–130)
HGB BLD-MCNC: 13.3 G/DL (ref 12–18)
IRON POC: 98 UG/DL (ref 37–170)
IRON SATURATION POC: 24 % (ref 15–55)
KETONES P FAST UR STRIP-MCNC: NEGATIVE MG/DL
LDL CHOLESTEROL POC: 46.8 MG/DL (ref 0–130)
LY# POC: 1.2 K/UL (ref 0.6–4.1)
LY% POC: 28.6 % (ref 10–58.5)
MCH RBC QN: 31.9 PG (ref 26–32)
MCHC RBC-ENTMCNC: 33.5 G/DL (ref 30–36)
MCV RBC: 95 FL (ref 80–97)
MID #, POC: 0.1 K/UL (ref 0–1.8)
MID% POC: 4.7 % (ref 0.1–24)
MUCUS UA POCT, MUCPOCT: NORMAL
PH UR STRIP: 7 [PH] (ref 5–7)
PLATELET # BLD: 210 K/UL (ref 140–440)
POTASSIUM SERPL-SCNC: 4.5 MMOL/L (ref 3.6–5)
PROT SERPL-MCNC: 6.1 G/DL (ref 6.3–8.2)
PROT UR QL STRIP: NEGATIVE
RBC # BLD: 4.16 M/UL (ref 4.2–6.3)
RBC UA POCT, RBCPOCT: 0
SODIUM SERPL-SCNC: 139 MMOL/L (ref 137–145)
SP GR UR STRIP: 1.01 (ref 1.01–1.02)
TCHOL/HDL RATIO (POC): 1.6 (ref 0–4)
TIBC POC: 406 UG/DL (ref 265–497)
TOTAL BILIRUBIN POC: 0.7 MG/DL (ref 0.2–1.3)
TRICH UA POCT, TRICHPOC: NEGATIVE
TRIGL SERPL-MCNC: 101 MG/DL (ref 0–200)
UA UROBILINOGEN AMB POC: NORMAL (ref 0.2–1)
URINALYSIS CLARITY POC: CLEAR
URINALYSIS COLOR POC: YELLOW
URINE BLOOD POC: NEGATIVE
URINE CULT COMMENT, POCT: NORMAL
URINE LEUKOCYTES POC: NORMAL
URINE NITRITES POC: NEGATIVE
VLDLC SERPL CALC-MCNC: 20.2 MG/DL
WBC # BLD: 4.2 K/UL (ref 4.1–10.9)
WBC UA POCT, WBCPOCT: NORMAL
YEAST UA POCT, YEASTPOC: NEGATIVE

## 2018-08-24 LAB
T4 FREE SERPL-MCNC: 0.99 NG/DL (ref 0.71–1.85)
TSH BLD-ACNC: 2.01 UIU/ML (ref 0.4–4.2)
VITAMIN D POC: 46.3 NG/ML (ref 30–96)

## 2018-08-27 RX ORDER — ATORVASTATIN CALCIUM 40 MG/1
TABLET, FILM COATED ORAL
Qty: 90 TAB | Refills: 2 | Status: SHIPPED | OUTPATIENT
Start: 2018-08-27 | End: 2019-05-24 | Stop reason: SDUPTHER

## 2018-08-30 ENCOUNTER — OFFICE VISIT (OUTPATIENT)
Dept: INTERNAL MEDICINE CLINIC | Age: 78
End: 2018-08-30

## 2018-08-30 VITALS
HEIGHT: 60 IN | OXYGEN SATURATION: 97 % | BODY MASS INDEX: 25.32 KG/M2 | WEIGHT: 129 LBS | DIASTOLIC BLOOD PRESSURE: 60 MMHG | TEMPERATURE: 98 F | HEART RATE: 64 BPM | SYSTOLIC BLOOD PRESSURE: 116 MMHG

## 2018-08-30 DIAGNOSIS — Z00.00 ANNUAL PHYSICAL EXAM: Primary | ICD-10-CM

## 2018-08-30 LAB — SPIROMETRY INTERPRETATION, SPITPOCT: NORMAL

## 2018-08-30 RX ORDER — OMEPRAZOLE 20 MG/1
20 TABLET, DELAYED RELEASE ORAL DAILY
COMMUNITY
End: 2019-10-14 | Stop reason: ALTCHOICE

## 2018-08-30 NOTE — PROGRESS NOTES
Nadine comes to the office today for a comprehensive medical evaluation. Past Medical/Surgical History:  1. Endometrial carcinoma, status post abdominal hysterectomy and bilateral salpingo oophorectomy , Dr. Victor M Mike.  She is now followed by Dr. Liam Rainey at [facility]. 2. S/P colonoscopic polypectomy with a family history of colon cancer. 3. GERD. 4. IBS. 5. Glaucoma, S/P cataract extraction OS. Chronic anxiety/depression. 6. Hypothyroidism, on replacement. 7. S/P tonsillectomy and appendectomy. 8. G3, P3, AB-, S/P  x two.  9. Arteriosclerotic heart disease. CT scan of the heart in  showed a coronary artery calcium score of 131. She had a normal stress Cardiolite in . 10. S/P lumbar laminectomy , followed by revision of laminectomy, L4-5 foraminotomy , Dr. Raegan Jenkins. 11. Psoriasis and seborrheic dermatitis, followed by Dr. Adilene Terrazas. 12. History of medication related to abnormal LFTs. 13. Recurrent herpes labialis. 14. Degenerative arthritis and torn labrum, left hip. Current Medications:  1. Methotrexate 2.5 mg, two tabs weekly. 2. Levoxyl 0.088 mg daily. 3. Lexapro 20 mg daily. 4. Atorvastatin 40 mg daily. 5. Folic acid 2 mg daily. 6. Citrucel, one packet daily, and Miralax as needed. 7. Travatan and Cosopt eyedrops as directed. 8. Dicyclomine prn.  9. Lorazepam prn. 10. Vitamin D 2,000 units daily. 11. Omega 3 1,000 mg daily. 12. Zyrtec 10 mg daily. 13. Doxepin 20 mg q.h.s.  14. Premarin 0.625 mg daily. Drug Allergies:  1. Penicillin. 2. Codeine. 3. Sulfa. 4. Cephalosporins. Social History:  She is . She is a retired nurse. She smoked two packs a day, but quit at age 34. She has an occasional glass of wine. She is quite active. She exercises every day. Does stretching exercises for her back and follows a prudent diet. Family History:  She has six brothers and one sister, all . Four brothers had coronary artery bypass surgery. One brother and one sister had metastatic colon cancer. Sister had cancer of the larynx. Her mother  at age 68 from a cardiac arrest during a pacemaker insertion. Her father  at age 80 from an MI. He also had dementia and type 2 diabetes, alcohol abuse and COPD. Review of Systems:  As noted above, Nadine is very active. She exercises daily. She's not having back or hip pain. She denies chest pain, chest pressure, chest tightness, shortness of breath, PND, orthopnea or ankle edema. She is followed regularly by ophthalmology and dermatology. The only place she has a problem at this point is on her feet. Physical Examination:  VITALS:  HT: 5'. WT: 129. BP: 116/60. T: 98.  P: 64 and regular. O2 sat on room air: 94%. HEENT:  Head - normocephalic, atraumatic. Eyes - pupils midrange, equal directly and consensually. Extraocular movements are normal.  Ears, nose and throat are normal.  NECK:  Without JVD, adenopathy, thyromegaly or carotid bruits. CHEST:  Lungs are clear. BREASTS:  Without masses. CV:  Heart - regular rate and rhythm. No audible murmurs or gallops. ABDOMEN:  Soft, non tender, without detectable hepatosplenomegaly, no abdominal masses or bruits are noted. EXTREMITIES:  Without edema. Pulses are normal.  SKIN:  No suspicious lesions. NEUROLOGIC:  Cranial nerves II-XII are intact. Strength, gait and mental status are normal for age. Studies:  EKG - sinus bradycardia, rate of 57. Poor R wave progression. No acute change. No change compared to prior tracing. PFT is normal.    Laboratory:  Hemoglobin and iron stores are normal.  Blood sugar, potassium, kidney function, calcium level and liver function tests are normal.  Urinalysis shows 5-10 white cells, no bacteria, otherwise normal.  (That was taken as a follow up to a recent UTI.)  TSH 2.0. Free T4 0.99. Vitamin D level 46.   Total cholesterol 175, triglycerides 101, LDL 47, . Impression:  1. Endometrial carcinoma, S/P hysterectomy and BSO. 2. S/P colonoscopic polypectomy/family history of colon cancer. 3. GERD and IBS. 4. Glaucoma. 5. S/P cataract extraction. 6. Hypothyroidism, treated. 7. Arteriosclerotic heart disease as outlined. 8. S/P lumbar laminectomy and revision. 9. Psoriasis and seborrheic dermatitis. Plan:  1. Continue current medication regimen as outlined. 2. She had a colonoscopy in 2014. No repeat was recommended but I think with her family history she should have an exam next year. 3. Bone density testing was done in 2017 and showed a T score of -1.4. As long as she's taking Premarin I think she doesn't have to have a repeat exam.  4. GYN exam is current with Dr. Earle Chaves. 5. Tetanus, whooping cough, influenza, Zostavax, Pneumovax 23 and Prevnar 13 are current. 6. I gave her a rx for Shingrix. 7. Eye exam is up to date. 8. I have referred her to Dr. Edelmira Mata with [facility], told her to come by here in October to get a flu shot. All screenings were reviewed and results discussed with the patient, who verbalized understand and agreement with the plans. A copy of the after visit summary with a personalized health plan was provided to the patient on the day of the visit.

## 2018-08-30 NOTE — PROGRESS NOTES
Chief Complaint   Patient presents with    Complete Physical    Annual Wellness Visit       Depression Risk Factor Screening:     PHQ over the last two weeks 8/30/2018   Little interest or pleasure in doing things Not at all   Feeling down, depressed, irritable, or hopeless Not at all   Total Score PHQ 2 0       Functional Ability and Level of Safety:     Activities of Daily Living  ADL Assessment 8/30/2018   Feeding yourself No Help Needed   Getting from bed to chair No Help Needed   Getting dressed No Help Needed   Bathing or showering No Help Needed   Walk across the room (includes cane/walker) No Help Needed   Using the telphone No Help Needed   Taking your medications No Help Needed   Preparing meals No Help Needed   Managing money (expenses/bills) No Help Needed   Moderately strenuous housework (laundry) No Help Needed   Shopping for personal items (toiletries/medicines) No Help Needed   Shopping for groceries No Help Needed   Driving No Help Needed   Climbing a flight of stairs No Help Needed   Getting to places beyond walking distances No Help Needed       Fall Risk  Fall Risk Assessment, last 12 mths 8/30/2018   Able to walk? Yes   Fall in past 12 months? No       Abuse Screen  Abuse Screening Questionnaire 8/30/2018   Do you ever feel afraid of your partner? N   Are you in a relationship with someone who physically or mentally threatens you? N   Is it safe for you to go home?  Y         Patient Care Team   Patient Care Team:  Bailey Gunn MD as PCP - General (Internal Medicine)

## 2018-08-30 NOTE — LETTER
8/30/2018 12:19 PM 
 
Ms. Susan Coates [address] [facility] [address] [id number] Dear Nadine: 
 
I am writing this letter as a follow up to your recent physical examination. I have enclosed copies of lab work for your review. After going over this information if you have any questions please give me a call. Your current active and past medical problems include: 1. Cancer of the uterus, for which you had a hysterectomy and both ovaries removed in 1994 by Dr. Albaro Csasidy. 
2. You have had colon polyps removed and you have a family history of colon cancer. 3. Irritable bowel syndrome. 4. GERD. 5. You had cataract extraction from your left eye and you are treated for glaucoma. 6. Hypothyroidism, on replacement. 7. You had a tonsillectomy and appendectomy. 8. You had two C-sections. 9. Arteriosclerotic heart disease. You had a CT scan of the heart in January, 2014. Coronary artery calcium score was 131, indicating moderate nonobstructive coronary artery disease. You had a normal stress test in January of 2013. 
10. You had a lumbar laminectomy in January of 2011 followed by a revision in October of 2011. 11. Psoriasis and seborrheic dermatitis. 12. You have degenerative arthritis and a torn labrum in your left hip. Current Medications: 1. Methotrexate 2 1/2 mg, two tabs weekly. 2. Levoxyl 0.088 mg daily. 3. Lexapro 20 mg daily. 4. Atorvastatin 40 mg daily. 5. Folic acid 2 mg daily. 6. Citrucel, one packet daily. 7. Miralax as needed. 8. Travatan and Cosopt eyedrops daily. 9. Dicyclomine and Lorazepam as needed. 10. Vitamin D 2,000 units daily. 11. Omega 3 1,000 mg daily. 12. Zyrtec 10 mg daily. 13. Premarin 0.625 mg daily. On physical examination, height 4'11\", weight 129, blood pressure 116/60, temperature 98, pulse 64 and regular. Your physical examination was entirely normal for your age.   Resting EKG shows a slow heart rate reflecting your excellent cardiovascular health and is otherwise within normal limits. Pulmonary function testing is normal. 
 
Regarding laboratory, hemoglobin and iron stores are normal.  Blood sugar, potassium, kidney function, calcium level and liver function tests are normal.  TSH, which is a measure of thyroid function, is normal at 2.0, indicating you are on the appropriate dose of thyroid hormone replacement. Total cholesterol 175, triglycerides 101, LDL, (bad cholesterol), 47 and HDL, (good cholesterol), 108. That is an excellent low risk lipid profile. Vitamin D level is normal at 46. Urine showed a few white blood cells, but the recent infection that you had has cleared. You had a colonoscopy in April of 2014. No follow up was recommended, however with the history of polyps and family history of colon cancer I think you should have one more exam next April. You had a bone density test last year. You have mild osteopenia. As long as you are taking Premarin and vitamin D I don't think you need a follow up exam for that. All your vaccines, including tetanus, whooping cough, Pneumovax 23, Prevnar 13, Zostavax and influenza are up to date. I gave you a prescription for the new shingles vaccine called Shingrix. Please get your flu vaccine at the end of October. Your eye and dermatology and GYN exams are current. You are most fortunate to enjoy such excellent health. It has been a pleasure taking care of you all these years and I certainly appreciate your confidence in my care. To continue with a  program I recommend [facility] and I would suggest you see Dr. Lon Bowens. I wish you only the best in the future. Sincerely, Reggie Christensen MD

## 2018-10-01 RX ORDER — DOXEPIN HYDROCHLORIDE 10 MG/1
CAPSULE ORAL
Qty: 60 CAP | Refills: 3 | Status: SHIPPED | OUTPATIENT
Start: 2018-10-01 | End: 2019-06-11

## 2018-10-01 NOTE — TELEPHONE ENCOUNTER
RX refill request from the patient/pharmacy. Patient last seen 8/30/18 with labs, and next appt. scheduled for TBD. Requested Prescriptions     Pending Prescriptions Disp Refills    doxepin (SINEQUAN) 10 mg capsule [Pharmacy Med Name: Doxepin HCl Oral Capsule 10 MG] 60 Cap 3     Sig: TAKE TWO CAPSULES BY MOUTH DAILY AT BEDTIME     .

## 2018-10-09 ENCOUNTER — CLINICAL SUPPORT (OUTPATIENT)
Dept: INTERNAL MEDICINE CLINIC | Age: 78
End: 2018-10-09

## 2018-10-09 DIAGNOSIS — Z23 ENCOUNTER FOR IMMUNIZATION: Primary | ICD-10-CM

## 2018-11-06 RX ORDER — LEVOTHYROXINE SODIUM 88 UG/1
TABLET ORAL
Qty: 90 TAB | Refills: 1 | Status: SHIPPED | OUTPATIENT
Start: 2018-11-06 | End: 2019-06-04 | Stop reason: SDUPTHER

## 2019-01-10 ENCOUNTER — OFFICE VISIT (OUTPATIENT)
Dept: INTERNAL MEDICINE CLINIC | Age: 79
End: 2019-01-10

## 2019-01-10 VITALS
HEIGHT: 60 IN | DIASTOLIC BLOOD PRESSURE: 70 MMHG | TEMPERATURE: 97.1 F | BODY MASS INDEX: 25.17 KG/M2 | WEIGHT: 128.2 LBS | RESPIRATION RATE: 16 BRPM | HEART RATE: 66 BPM | OXYGEN SATURATION: 97 % | SYSTOLIC BLOOD PRESSURE: 106 MMHG

## 2019-01-10 DIAGNOSIS — K21.9 GASTROESOPHAGEAL REFLUX DISEASE, ESOPHAGITIS PRESENCE NOT SPECIFIED: ICD-10-CM

## 2019-01-10 DIAGNOSIS — Z80.0 FAMILY HISTORY OF COLON CANCER: ICD-10-CM

## 2019-01-10 DIAGNOSIS — L40.52 PSORIATIC ARTHRITIS MUTILANS (HCC): Primary | ICD-10-CM

## 2019-01-10 RX ORDER — CLOBETASOL PROPIONATE 0.5 MG/G
OINTMENT TOPICAL
COMMUNITY
Start: 2018-10-23

## 2019-01-10 RX ORDER — POLYETHYLENE GLYCOL 3350 17 G/17G
POWDER, FOR SOLUTION ORAL
COMMUNITY
Start: 2017-05-12 | End: 2019-06-11

## 2019-01-10 RX ORDER — DORZOLAMIDE HYDROCHLORIDE AND TIMOLOL MALEATE 20; 5 MG/ML; MG/ML
SOLUTION/ DROPS OPHTHALMIC
COMMUNITY
Start: 2017-05-12

## 2019-01-10 NOTE — LETTER
To Whom It May Concern: This letter is on behalf of Ms. Nadine Segundo, who has been a patient at [facility] for many years. She is currently being treated for psoriasis and seborrheic dermatitis by Dr. Evelin Mendez, dermatologist.  She has been managed very effectively on Methotrexate 2.5 mg, two tablets weekly. This has allowed her to be symptom free. As you might well know, psoriasis is an autoimmune disease that is very well managed on Methotrexate. It appears that in the past there has been some confusion that she was treated for rheumatoid arthritis, which is not the case. This letter is to clarify the use of Methotrexate for management of psoriasis. Should you have any questions, feel free to contact us at the office at [phone].

## 2019-01-10 NOTE — PROGRESS NOTES
1. Have you been to the ER, urgent care clinic since your last visit? Hospitalized since your last visit? No    2. Have you seen or consulted any other health care providers outside of the [address] [place] [address] since your last visit? Include any pap smears or colon screening.  No       Chief Complaint   Patient presents with    New Patient     Transition of Care

## 2019-01-11 RX ORDER — ESCITALOPRAM OXALATE 20 MG/1
TABLET ORAL
Qty: 90 TAB | Refills: 1 | Status: SHIPPED | OUTPATIENT
Start: 2019-01-11 | End: 2019-07-21 | Stop reason: SDUPTHER

## 2019-01-11 NOTE — TELEPHONE ENCOUNTER
Patient Last Seen:  01- without labs    Last labs done: 08-    Next appointment:  03-      Requested Prescriptions     Pending Prescriptions Disp Refills    escitalopram oxalate (LEXAPRO) 20 mg tablet 90 Tab 1     Sig: TAKE 1 TABLET BY MOUTH EVERY DAY

## 2019-01-14 NOTE — PATIENT INSTRUCTIONS

## 2019-01-14 NOTE — PROGRESS NOTES
Subjective:  Ms. Fernando Rodríguez is a pleasant [de-identified]year old lady who comes in today to get established as a new patient under my care. She is a former patient of Dr. Andrew Katz. She is in need of getting a letter to confirm that she is using Methotrexate for psoriasis and seborrheic dermatitis instead of rheumatoid arthritis. She tells me there has been lots of confusion in the past and she wants this clarified. She has been followed by Dr. Jm Alexander. She also tells me today that she would like to be referred to Dr. Silverio Grant for colonoscopy. She does have a very strong family history of colon cancer. She also tells me that in the past she has been treated for GERD with Prilosec. Although she takes her medication faithfully she has had a little more trouble with indigestion. Her appetite has remained unchanged. Her weight is stable. She does have chronic constipation but denies presence of blood in her stools or melena.     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    Depression     Elevated liver function tests 8/3/2017    MIAN (generalized anxiety disorder) 8/3/2017    GERD (gastroesophageal reflux disease)     Glaucoma     Glaucoma 8/3/2017    Headache, acute 8/3/2017    History of colonoscopy with polypectomy 8/3/2017    History of endometrial cancer 8/3/2017    Hyperlipidemia 8/3/2017    Hypothyroid 8/3/2017    IBS (irritable bowel syndrome) 8/3/2017    AMPARO (iron deficiency anemia) 8/3/2017    Insomnia 8/3/2017    Long-term use of immunosuppressant medication 8/3/2017    Medication side effect 8/3/2017    Melena 8/3/2017    Muscle spasm 8/3/2017    Osteoporosis 8/3/2017    Other ill-defined conditions(799.89)     high cholesterol    Other ill-defined conditions(799.89)     psoriasis    Post-menopausal 8/3/2017    Psoriasis 8/3/2017    Pyuria, sterile 8/3/2017    Rash 8/3/2017    Rosacea blepharoconjunctivitis 8/3/2017    Sciatica 8/3/2017    Somatic dysfunction of cervical region 8/3/2017    Spondylosis of cervical region without myelopathy or radiculopathy 8/3/2017    Thrush 8/3/2017    Thyroid disease     Urticaria 8/3/2017     Past Surgical History:   Procedure Laterality Date    COLORECTAL SCRN; HI RISK IND  4/2/2014         HX APPENDECTOMY      HX CATARACT REMOVAL      right    HX GYN      hysterectomy    HX HEENT      bilateral ear    HX HEENT      LASIK    HX ORTHOPAEDIC  1-2011    lumbar lamenectomy    HX OTHER SURGICAL      left cateract extraction with lens implant/ BILATERAL LASIK    HX TONSILLECTOMY      NEUROLOGICAL PROCEDURE UNLISTED  2011    spinal fusion       Current Outpatient Medications on File Prior to Visit   Medication Sig Dispense Refill    estrogens, conjugated, (PREMARIN) 0.45 mg tablet Premarin 0.45 mg tablet   1 Tab PO Daily for 3 months.  dorzolamide-timolol (COSOPT) 22.3-6.8 mg/mL ophthalmic solution Cosopt 22.3 mg-6.8 mg/mL eye drops   Prescribed by non [facility] MD      Lactobacillus acidophilus (PROBIOTIC PO) Take  by mouth.  polyethylene glycol (MIRALAX) 17 gram/dose powder Miralax 17 gram/dose oral powder   Prescribed by non [facility] MD      clobetasol (TEMOVATE) 0.05 % ointment       levothyroxine (SYNTHROID) 88 mcg tablet TAKE ONE TABLET BY MOUTH ONE TIME DAILY  90 Tab 1    doxepin (SINEQUAN) 10 mg capsule TAKE TWO CAPSULES BY MOUTH DAILY AT BEDTIME  60 Cap 3    omeprazole (PRILOSEC OTC) 20 mg tablet Take 20 mg by mouth daily.       atorvastatin (LIPITOR) 40 mg tablet TAKE 1 TABLET BY MOUTH DAILY  90 Tab 2    folic acid (FOLVITE) 1 mg tablet TAKE TWO TABLETS BY MOUTH DAILY  200 Tab 2    methotrexate (RHEUMATREX) 2.5 mg tablet 2 (TWO) TABLET, ORAL, WEEKLY 20 Tab 10    dicyclomine (BENTYL) 10 mg capsule TAKE 1 CAPSULE BY MOUTH 4 TIMES A  Cap 3    diphenoxylate-atropine (LOMOTIL) 2.5-0.025 mg per tablet Take 1 Tab by mouth four (4) times daily as needed for Diarrhea. Max Daily Amount: 4 Tabs. 40 Tab 0    naproxen sodium (ALEVE) 220 mg tablet Take 220 mg by mouth two (2) times daily (with meals).  cholecalciferol, vitamin D3, (VITAMIN D3) 2,000 unit tab Take  by mouth.  ibuprofen (ADVIL) 200 mg tablet Take 400 mg by mouth every six (6) hours as needed.  METHOTREXATE Take  by mouth Every Friday.  travoprost (TRAVATAN Z) 0.004 % ophthalmic solution Administer 1 Drop to both eyes nightly.  MAGNESIUM PO Take 500 mg by mouth daily.  lysine (L-LYSINE) 500 mg cap Take 500 mg by mouth daily.  cyclobenzaprine (FLEXERIL) 10 mg tablet Take 1 Tab by mouth three (3) times daily as needed for Muscle Spasm(s) (neck pain). 60 Tab 0    lorazepam (ATIVAN) 0.5 mg tablet Take 0.5 mg by mouth as needed.  CHOLECALCIFEROL, VITAMIN D3, (VITAMIN D-3 PO) Take 1 Tab by mouth daily.  CETIRIZINE HCL (ZYRTEC PO) Take 10 mg by mouth daily.  PREMARIN 0.625 mg tablet TAKE 1 TABLET BY MOUTH EVERY DAY (Patient not taking: Reported on 1/10/2019) 90 Tab 2    valACYclovir (VALTREX) 1 gram tablet Take 1 Tab by mouth three (3) times daily. (Patient not taking: Reported on 1/10/2019) 21 Tab 0    clotrimazole (MYCELEX) 10 mg joe Take 1 Tab by mouth five (5) times daily. (Patient not taking: Reported on 1/10/2019) 70 Tab 2    prednisoLONE acetate (PRED FORTE) 1 % ophthalmic suspension Administer 1 Drop to both eyes four (4) times daily. (Patient not taking: Reported on 1/10/2019) 10 mL 6    neomycin-polymyxin-hydrocortisone, buffered, (PEDIOTIC) 3.5-10,000-1 mg/mL-unit/mL-% otic suspension Administer 4 Drops into each ear three (3) times daily.  (Patient not taking: Reported on 1/10/2019) 10 mL 6    triamcinolone acetonide (KENALOG) 0.1 % topical cream Apply  to affected area two (2) times a day. use thin layer      FLAXSEED OIL (OMEGA 3 PO) Take  by mouth.  BRIMONIDINE TARTRATE/TIMOLOL (COMBIGAN OP) Apply  to eye every morning.  levothyroxine (SYNTHROID) 100 mcg tablet Take 100 mcg by mouth Daily (before breakfast).  CALCIUM CARBONATE/VITAMIN D3 (CALTRATE-600 + D VIT D3, 800, PO) Take  by mouth daily.  minocycline (MINOCIN, DYNACIN) 100 mg capsule Take 100 mg by mouth daily.  evening primrose oil 500 mg cap Take  by mouth daily.  Aspirin, Buffered 81 mg tab Take  by mouth daily.  METHYLCELLULOSE (CITRUCEL PO) Take 1 Tab by mouth daily. No current facility-administered medications on file prior to visit. Allergies   Allergen Reactions    Cephalosporins Shortness of Breath    Codeine Nausea and Vomiting    Pcn [Penicillins] Rash    Sulfa (Sulfonamide Antibiotics) Unknown (comments)   Physical Examination:  GENERAL:  Pleasant female in no acute distress. She is alert and oriented. She answers my questions appropriately. She looks much younger than her stated age. VITALS:  BP: 106/70. P: 66.  R: 16.  T: 97.1. O2 sat: 97.  WT: 128 pounds. HT: 5'. HEENT:  Normocephalic, atraumatic. NECK:  Supple without adenopathy. CHEST:  Lungs clear to auscultation, no rales or wheezes. CV:  Heart regular rhythm without murmur or gallop. ABDOMEN:  Soft, non tender, no organomegaly or masses. EXTREMITIES:  No edema or calf tenderness. Distal pulses were present. Impression:  1. Psoriasis and seborrheic dermatitis. 2. Chronic constipation. 3. Strong family history of colon cancer. 4. GERD. Plan:  1. I will have a letter available for her on Monday. 2. I am referring her back to Dr. Jagruti Alvarez for colonoscopy and possibly endoscopy in view of her increasing symptoms of GERD.   3. She will return beginning of March for her Medicare wellness and updated history and physical.  4. She certainly will contact us should she have any concerns prior to that upcoming appointment.

## 2019-03-07 ENCOUNTER — OFFICE VISIT (OUTPATIENT)
Dept: INTERNAL MEDICINE CLINIC | Age: 79
End: 2019-03-07

## 2019-03-07 VITALS
DIASTOLIC BLOOD PRESSURE: 70 MMHG | OXYGEN SATURATION: 95 % | HEIGHT: 60 IN | TEMPERATURE: 97.9 F | RESPIRATION RATE: 18 BRPM | SYSTOLIC BLOOD PRESSURE: 136 MMHG | WEIGHT: 126.8 LBS | BODY MASS INDEX: 24.9 KG/M2 | HEART RATE: 60 BPM

## 2019-03-07 DIAGNOSIS — E78.5 DYSLIPIDEMIA: ICD-10-CM

## 2019-03-07 DIAGNOSIS — I10 ESSENTIAL HYPERTENSION: ICD-10-CM

## 2019-03-07 DIAGNOSIS — N39.0 URINARY TRACT INFECTION WITHOUT HEMATURIA, SITE UNSPECIFIED: ICD-10-CM

## 2019-03-07 DIAGNOSIS — M25.561 ACUTE PAIN OF BOTH KNEES: ICD-10-CM

## 2019-03-07 DIAGNOSIS — Z00.00 WELCOME TO MEDICARE PREVENTIVE VISIT: Primary | ICD-10-CM

## 2019-03-07 DIAGNOSIS — R53.83 FATIGUE, UNSPECIFIED TYPE: ICD-10-CM

## 2019-03-07 DIAGNOSIS — E55.9 VITAMIN D DEFICIENCY: ICD-10-CM

## 2019-03-07 DIAGNOSIS — E03.9 ACQUIRED HYPOTHYROIDISM: ICD-10-CM

## 2019-03-07 DIAGNOSIS — M25.562 ACUTE PAIN OF BOTH KNEES: ICD-10-CM

## 2019-03-07 DIAGNOSIS — R73.9 HYPERGLYCEMIA: ICD-10-CM

## 2019-03-07 LAB
25(OH)D3 SERPL-MCNC: 59 NG/ML (ref 30–96)
A-G RATIO,AGRAT: 1.5 RATIO
ALBUMIN SERPL-MCNC: 4.1 G/DL (ref 3.9–5.4)
ALP SERPL-CCNC: 91 U/L (ref 38–126)
ALT SERPL-CCNC: 25 U/L (ref 9–52)
ANION GAP SERPL CALC-SCNC: 6 MMOL/L
AST SERPL W P-5'-P-CCNC: 35 U/L (ref 14–36)
BACTERIA,BACTU: ABNORMAL
BILIRUB SERPL-MCNC: 0.4 MG/DL (ref 0.2–1.3)
BILIRUB UR QL: NEGATIVE
BUN SERPL-MCNC: 17 MG/DL (ref 7–17)
BUN/CREATININE RATIO,BUCR: 19 RATIO
CALCIUM SERPL-MCNC: 10.4 MG/DL (ref 8.4–10.2)
CHLORIDE SERPL-SCNC: 104 MMOL/L (ref 98–107)
CHOL/HDL RATIO,CHHD: 2 RATIO (ref 0–4)
CHOLEST SERPL-MCNC: 158 MG/DL (ref 0–200)
CLARITY: CLEAR
CO2 SERPL-SCNC: 29 MMOL/L (ref 22–32)
COLOR UR: ABNORMAL
CREAT SERPL-MCNC: 0.9 MG/DL (ref 0.7–1.2)
ERYTHROCYTE [DISTWIDTH] IN BLOOD BY AUTOMATED COUNT: 14.2 %
GLOBULIN,GLOB: 2.7
GLUCOSE 24H UR-MRATE: NEGATIVE G/(24.H)
GLUCOSE SERPL-MCNC: 82 MG/DL (ref 65–105)
HCT VFR BLD AUTO: 43.9 % (ref 37–51)
HDLC SERPL-MCNC: 85 MG/DL (ref 35–130)
HGB BLD-MCNC: 14.8 G/DL (ref 12–18)
HGB UR QL STRIP: NEGATIVE
KETONES UR QL STRIP.AUTO: NEGATIVE
LDL/HDL RATIO,LDHD: 0 RATIO
LDLC SERPL CALC-MCNC: 41 MG/DL (ref 0–130)
LEUKOCYTE ESTERASE: ABNORMAL
LYMPHOCYTES ABSOLUTE: 1.3 K/UL (ref 0.6–4.1)
LYMPHOCYTES NFR BLD: 23.2 % (ref 10–58.5)
MCH RBC QN AUTO: 32.6 PG (ref 26–32)
MCHC RBC AUTO-ENTMCNC: 33.7 G/DL (ref 30–36)
MCV RBC AUTO: 96.7 FL (ref 80–97)
MONOCYTES ABS-DIF,2141: 0.4 K/UL (ref 0–1.8)
MONOCYTES NFR BLD: 7 % (ref 0.1–24)
NEUTROPHILS # BLD: 69.8 % (ref 37–92)
NEUTROPHILS ABS,2156: 3.9 K/UL (ref 2–7.8)
NITRITE UR QL STRIP.AUTO: NEGATIVE
PH UR STRIP: 8 [PH] (ref 5–7)
PLATELET # BLD AUTO: 258 K/UL (ref 140–440)
PMV BLD AUTO: 7.9 FL
POTASSIUM SERPL-SCNC: 4.7 MMOL/L (ref 3.6–5)
PROT SERPL-MCNC: 6.8 G/DL (ref 6.3–8.2)
PROT UR STRIP-MCNC: NEGATIVE MG/DL
RBC # BLD AUTO: 4.54 M/UL (ref 4.2–6.3)
RBC #/AREA URNS HPF: ABNORMAL #/HPF
SODIUM SERPL-SCNC: 139 MMOL/L (ref 137–145)
SP GR UR REFRACTOMETRY: 1.02 (ref 1–1.03)
SQUAMOUS EPITHELIAL CELLS: ABNORMAL
T4 FREE SERPL-MCNC: 1.13 NG/DL (ref 0.58–2.3)
TRIGL SERPL-MCNC: 160 MG/DL (ref 0–200)
TSH SERPL DL<=0.05 MIU/L-ACNC: 1.79 UIU/ML (ref 0.34–5.6)
UROBILINOGEN UR QL STRIP.AUTO: NEGATIVE
VLDLC SERPL CALC-MCNC: 32 MG/DL
WBC # BLD AUTO: 5.6 K/UL (ref 4.1–10.9)
WBC URNS QL MICRO: ABNORMAL #/HPF

## 2019-03-07 NOTE — PATIENT INSTRUCTIONS
Medicare Wellness Visit, Female     The best way to live healthy is to have a lifestyle where you eat a well-balanced diet, exercise regularly, limit alcohol use, and quit all forms of tobacco/nicotine, if applicable. Regular preventive services are another way to keep healthy. Preventive services (vaccines, screening tests, monitoring & exams) can help personalize your care plan, which helps you manage your own care. Screening tests can find health problems at the earliest stages, when they are easiest to treat. [facility] follows the current, evidence-based guidelines published by the [facility] ([facility]) when recommending preventive services for our patients. Because we follow these guidelines, sometimes recommendations change over time as research supports it. (For example, mammograms used to be recommended annually. Even though Medicare will still pay for an annual mammogram, the newer guidelines recommend a mammogram every two years for women of average risk.)  Of course, you and your doctor may decide to screen more often for some diseases, based on your risk and your health status. Preventive services for you include:  - Medicare offers their members a free annual wellness visit, which is time for you and your primary care provider to discuss and plan for your preventive service needs. Take advantage of this benefit every year!  -All adults over the age of 72 should receive the recommended pneumonia vaccines. Current USPSTF guidelines recommend a series of two vaccines for the best pneumonia protection.   -All adults should have a flu vaccine yearly and a tetanus vaccine every 10 years. All adults age 61 and older should receive a shingles vaccine once in their lifetime.    -A bone mass density test is recommended when a woman turns 65 to screen for osteoporosis. This test is only recommended one time, as a screening.  Some providers will use this same test as a disease monitoring tool if you already have osteoporosis. -All adults age 38-68 who are overweight should have a diabetes screening test once every three years.   -Other screening tests and preventive services for persons with diabetes include: an eye exam to screen for diabetic retinopathy, a kidney function test, a foot exam, and stricter control over your cholesterol.   -Cardiovascular screening for adults with routine risk involves an electrocardiogram (ECG) at intervals determined by your doctor.   -Colorectal cancer screenings should be done for adults age 54-65 with no increased risk factors for colorectal cancer. There are a number of acceptable methods of screening for this type of cancer. Each test has its own benefits and drawbacks. Discuss with your doctor what is most appropriate for you during your annual wellness visit. The different tests include: colonoscopy (considered the best screening method), a fecal occult blood test, a fecal DNA test, and sigmoidoscopy. -Breast cancer screenings are recommended every other year for women of normal risk, age 54-69.  -Cervical cancer screenings for women over age 72 are only recommended with certain risk factors.   -All adults born between [facility] should be screened once for Hepatitis C. Here is a list of your current Health Maintenance items (your personalized list of preventive services) with a due date:  Health Maintenance Due   Topic Date Due    Shingles Vaccine (1 of 2) 04/05/1988    Glaucoma Screening   04/05/2003            High Blood Pressure: Care Instructions  Overview    It's normal for blood pressure to go up and down throughout the day. But if it stays up, you have high blood pressure. Another name for high blood pressure is hypertension. Despite what a lot of people think, high blood pressure usually doesn't cause headaches or make you feel dizzy or lightheaded. It usually has no symptoms.  But it does increase your risk of stroke, heart attack, and other problems. You and your doctor will talk about your risks of these problems based on your blood pressure. Your doctor will give you a goal for your blood pressure. Your goal will be based on your health and your age. Lifestyle changes, such as eating healthy and being active, are always important to help lower blood pressure. You might also take medicine to reach your blood pressure goal.  Follow-up care is a key part of your treatment and safety. Be sure to make and go to all appointments, and call your doctor if you are having problems. It's also a good idea to know your test results and keep a list of the medicines you take. How can you care for yourself at home? Medical treatment  · If you stop taking your medicine, your blood pressure will go back up. You may take one or more types of medicine to lower your blood pressure. Be safe with medicines. Take your medicine exactly as prescribed. Call your doctor if you think you are having a problem with your medicine. · Talk to your doctor before you start taking aspirin every day. Aspirin can help certain people lower their risk of a heart attack or stroke. But taking aspirin isn't right for everyone, because it can cause serious bleeding. · See your doctor regularly. You may need to see the doctor more often at first or until your blood pressure comes down. · If you are taking blood pressure medicine, talk to your doctor before you take decongestants or anti-inflammatory medicine, such as ibuprofen. Some of these medicines can raise blood pressure. · Learn how to check your blood pressure at home. Lifestyle changes  · Stay at a healthy weight. This is especially important if you put on weight around the waist. Losing even 10 pounds can help you lower your blood pressure. · If your doctor recommends it, get more exercise. Walking is a good choice. Bit by bit, increase the amount you walk every day.  Try for at least 30 minutes on most days of the week. You also may want to swim, bike, or do other activities. · Avoid or limit alcohol. Talk to your doctor about whether you can drink any alcohol. · Try to limit how much sodium you eat to less than 2,300 milligrams (mg) a day. Your doctor may ask you to try to eat less than 1,500 mg a day. · Eat plenty of fruits (such as bananas and oranges), vegetables, legumes, whole grains, and low-fat dairy products. · Lower the amount of saturated fat in your diet. Saturated fat is found in animal products such as milk, cheese, and meat. Limiting these foods may help you lose weight and also lower your risk for heart disease. · Do not smoke. Smoking increases your risk for heart attack and stroke. If you need help quitting, talk to your doctor about stop-smoking programs and medicines. These can increase your chances of quitting for good. When should you call for help? Call 911 anytime you think you may need emergency care. This may mean having symptoms that suggest that your blood pressure is causing a serious heart or blood vessel problem. Your blood pressure may be over 180/120.   For example, call 911 if:    · You have symptoms of a heart attack. These may include:  ? Chest pain or pressure, or a strange feeling in the chest.  ? Sweating. ? Shortness of breath. ? Nausea or vomiting. ? Pain, pressure, or a strange feeling in the back, neck, jaw, or upper belly or in one or both shoulders or arms. ? Lightheadedness or sudden weakness. ? A fast or irregular heartbeat.     · You have symptoms of a stroke. These may include:  ? Sudden numbness, tingling, weakness, or loss of movement in your face, arm, or leg, especially on only one side of your body. ? Sudden vision changes. ? Sudden trouble speaking. ? Sudden confusion or trouble understanding simple statements. ? Sudden problems with walking or balance.   ? A sudden, severe headache that is different from past headaches.     · You have severe back or belly pain.    Do not wait until your blood pressure comes down on its own. Get help right away.   Call your doctor now or seek immediate care if:    · Your blood pressure is much higher than normal (such as 180/120 or higher), but you don't have symptoms.     · You think high blood pressure is causing symptoms, such as:  ? Severe headache.  ? Blurry vision.    Watch closely for changes in your health, and be sure to contact your doctor if:    · Your blood pressure measures higher than your doctor recommends at least 2 times. That means the top number is higher or the bottom number is higher, or both.     · You think you may be having side effects from your blood pressure medicine. Where can you learn more? Go to http://yoandy-graham.info/. Enter Z675 in the search box to learn more about \"High Blood Pressure: Care Instructions. \"  Current as of: July 22, 2018  Content Version: 11.9  © 2854-8107 [employer]. Care instructions adapted under license by [employer] (which disclaims liability or warranty for this information). If you have questions about a medical condition or this instruction, always ask your healthcare professional. [place] [address] any warranty or liability for your use of this information.

## 2019-03-07 NOTE — PROGRESS NOTES
Subjective:  Ms. Ted Telles is a pleasant [de-identified]year old lady who comes in today for updated history and physical.  She is a former patient of Dr. Deanna Reynolds. She has no complaints.     Past Medical History:   Diagnosis Date    Annual physical exam 8/3/2017    Anxiety 8/3/2017    Arthralgia 8/3/2017    Arthritis     Arthritis, hip 8/3/2017    Avascular necrosis of femoral head ([facility] [address] left 8/3/2017    Bone loss 8/3/2017    Cancer ([facility] [address]     endometrial    Contusion of left knee, initial encounter 8/3/2017    Degenerative arthritis of lumbar spine 8/3/2017    Depression     Elevated liver function tests 8/3/2017    MIAN (generalized anxiety disorder) 8/3/2017    GERD (gastroesophageal reflux disease)     Glaucoma     Glaucoma 8/3/2017    Headache, acute 8/3/2017    History of colonoscopy with polypectomy 8/3/2017    History of endometrial cancer 8/3/2017    Hyperlipidemia 8/3/2017    Hypothyroid 8/3/2017    IBS (irritable bowel syndrome) 8/3/2017    AMPARO (iron deficiency anemia) 8/3/2017    Insomnia 8/3/2017    Long-term use of immunosuppressant medication 8/3/2017    Medication side effect 8/3/2017    Melena 8/3/2017    Muscle spasm 8/3/2017    Osteoporosis 8/3/2017    Other ill-defined conditions(799.89)     high cholesterol    Other ill-defined conditions(799.89)     psoriasis    Post-menopausal 8/3/2017    Psoriasis 8/3/2017    Pyuria, sterile 8/3/2017    Rash 8/3/2017    Rosacea blepharoconjunctivitis 8/3/2017    Sciatica 8/3/2017    Somatic dysfunction of cervical region 8/3/2017    Spondylosis of cervical region without myelopathy or radiculopathy 8/3/2017    Thrush 8/3/2017    Thyroid disease     Urticaria 8/3/2017     Past Surgical History:   Procedure Laterality Date    COLORECTAL SCRN; HI RISK IND  4/2/2014         HX APPENDECTOMY      HX CATARACT REMOVAL      right    HX GYN      hysterectomy    HX HEENT      bilateral ear    HX HEENT      LASIK    HX ORTHOPAEDIC 1-2011    lumbar lamenectomy    HX OTHER SURGICAL      left cateract extraction with lens implant/ BILATERAL LASIK    HX TONSILLECTOMY      NEUROLOGICAL PROCEDURE UNLISTED  2011    spinal fusion       Current Outpatient Medications on File Prior to Visit   Medication Sig Dispense Refill    escitalopram oxalate (LEXAPRO) 20 mg tablet TAKE 1 TABLET BY MOUTH EVERY DAY 90 Tab 1    estrogens, conjugated, (PREMARIN) 0.45 mg tablet Premarin 0.45 mg tablet   1 Tab PO Daily for 3 months.  dorzolamide-timolol (COSOPT) 22.3-6.8 mg/mL ophthalmic solution Cosopt 22.3 mg-6.8 mg/mL eye drops   Prescribed by non [facility] MD      Lactobacillus acidophilus (PROBIOTIC PO) Take  by mouth.  polyethylene glycol (MIRALAX) 17 gram/dose powder Miralax 17 gram/dose oral powder   Prescribed by [facility] MD      clobetasol (TEMOVATE) 0.05 % ointment       levothyroxine (SYNTHROID) 88 mcg tablet TAKE ONE TABLET BY MOUTH ONE TIME DAILY  90 Tab 1    omeprazole (PRILOSEC OTC) 20 mg tablet Take 20 mg by mouth daily.  atorvastatin (LIPITOR) 40 mg tablet TAKE 1 TABLET BY MOUTH DAILY  90 Tab 2    folic acid (FOLVITE) 1 mg tablet TAKE TWO TABLETS BY MOUTH DAILY  200 Tab 2    methotrexate (RHEUMATREX) 2.5 mg tablet 2 (TWO) TABLET, ORAL, WEEKLY 20 Tab 10    dicyclomine (BENTYL) 10 mg capsule TAKE 1 CAPSULE BY MOUTH 4 TIMES A  Cap 3    PREMARIN 0.625 mg tablet TAKE 1 TABLET BY MOUTH EVERY DAY 90 Tab 2    diphenoxylate-atropine (LOMOTIL) 2.5-0.025 mg per tablet Take 1 Tab by mouth four (4) times daily as needed for Diarrhea. Max Daily Amount: 4 Tabs. 40 Tab 0    naproxen sodium (ALEVE) 220 mg tablet Take 220 mg by mouth two (2) times daily (with meals).  travoprost (TRAVATAN Z) 0.004 % ophthalmic solution Administer 1 Drop to both eyes nightly.  MAGNESIUM PO Take 500 mg by mouth daily.  lysine (L-LYSINE) 500 mg cap Take 500 mg by mouth daily.       cyclobenzaprine (FLEXERIL) 10 mg tablet Take 1 Tab by mouth three (3) times daily as needed for Muscle Spasm(s) (neck pain). 60 Tab 0    lorazepam (ATIVAN) 0.5 mg tablet Take 0.5 mg by mouth as needed.  CHOLECALCIFEROL, VITAMIN D3, (VITAMIN D-3 PO) Take 1 Tab by mouth daily.  CETIRIZINE HCL (ZYRTEC PO) Take 10 mg by mouth daily.  doxepin (SINEQUAN) 10 mg capsule TAKE TWO CAPSULES BY MOUTH DAILY AT BEDTIME  (Patient not taking: Reported on 3/7/2019) 60 Cap 3    valACYclovir (VALTREX) 1 gram tablet Take 1 Tab by mouth three (3) times daily. (Patient not taking: Reported on 1/10/2019) 21 Tab 0    clotrimazole (MYCELEX) 10 mg joe Take 1 Tab by mouth five (5) times daily. (Patient not taking: Reported on 1/10/2019) 70 Tab 2    prednisoLONE acetate (PRED FORTE) 1 % ophthalmic suspension Administer 1 Drop to both eyes four (4) times daily. (Patient not taking: Reported on 1/10/2019) 10 mL 6    neomycin-polymyxin-hydrocortisone, buffered, (PEDIOTIC) 3.5-10,000-1 mg/mL-unit/mL-% otic suspension Administer 4 Drops into each ear three (3) times daily. (Patient not taking: Reported on 1/10/2019) 10 mL 6    triamcinolone acetonide (KENALOG) 0.1 % topical cream Apply  to affected area two (2) times a day. use thin layer      cholecalciferol, vitamin D3, (VITAMIN D3) 2,000 unit tab Take  by mouth.  FLAXSEED OIL (OMEGA 3 PO) Take  by mouth.  ibuprofen (ADVIL) 200 mg tablet Take 400 mg by mouth every six (6) hours as needed.  BRIMONIDINE TARTRATE/TIMOLOL (COMBIGAN OP) Apply  to eye every morning.  levothyroxine (SYNTHROID) 100 mcg tablet Take 100 mcg by mouth Daily (before breakfast).  METHOTREXATE Take  by mouth Every Friday.  CALCIUM CARBONATE/VITAMIN D3 (CALTRATE-600 + D VIT D3, 800, PO) Take  by mouth daily.  minocycline (MINOCIN, DYNACIN) 100 mg capsule Take 100 mg by mouth daily.  evening primrose oil 500 mg cap Take  by mouth daily.  Aspirin, Buffered 81 mg tab Take  by mouth daily.       METHYLCELLULOSE (CITRUCEL PO) Take 1 Tab by mouth daily. No current facility-administered medications on file prior to visit. Allergies   Allergen Reactions    Cephalosporins Shortness of Breath    Codeine Nausea and Vomiting    Pcn [Penicillins] Rash    Sulfa (Sulfonamide Antibiotics) Unknown (comments)           Past Medical History/Surgeries:    1. Appendectomy. 2. Bilateral cataract extraction. 3. Total abdominal hysterectomy. 4. Bilateral ear surgery and subsequent bilateral hearing loss, for which she uses hearing aids. 5. Tonsillectomy. 6. Lumbar laminectomy. 7. Lumbar spinal fusion. Illnesses:    1. Psoriasis and seborrheic dermatitis, under the care of Dr. Erich Olivares. 2. Hypothyroidism. 3. Hyperlipidemia. 4. Glaucoma. 5. Allergic rhinitis. 6. Vitamin D deficiency. 7. IBS. 8. Chronic depression. Family History:  Father  80years of age secondary to complications of dementia. Mother  at 68 of a heart attack. Two siblings  of colon cancer. One brother  of complications of dementia. Social History:  She is a retired nurse. She is . She has three children living and well. Allergies:  Cephalosporin and penicillin caused shortness of breath and rash. Codeine - nausea and vomiting. Sulfa drugs - she was unsure. Medications:  1. Travoprost 0.004% ophthalmic solution, one drop to both eyes nightly. 2. Premarin 0.625 mg daily. 3. Miralax 17 gram per dose oral powder daily as needed. 4. Prilosec 20 mg daily. 5. Pediotic 3.5-10,000-1 mg/ml unit/ml otic suspension, four drops in each ear three times a day as needed. 6. Aleve 220 mg twice daily with meals. 7. Atorvastatin 40 mg daily. 8. Zyrtec 10 mg daily. 9. Vitamin D3 1,000 units daily. 10. Levothyroxine 88 mcg daily. Habits:  Nonsmoker and has occasional glass of wine. Review of Systems:  HEENT:  Denies any headaches, dizziness or blurred vision.   She does get regular eye exams and follow up with Dr. Darek Beck Nicole at the hospitals. CVR:  Denies any chest pain or palpitations. Denies any syncopal episode, shortness of breath, cough, wheezing, PND or orthopnea. Denies any ankle edema. GI:  Appetite is good. She tells me she recently had some type of viral infection where she had diarrhea for almost five or six days. This seems to have subsided. She denies presence of blood in stools or melena. She has continued to have difficulty with some reflux. She is already set up to see Dr. Silverio Grant in the next few weeks. :  Denies any urinary symptoms. GYN:  She did have a pelvic and pap, as well as mammogram, by Dr. Delroy Purvis at [facility] two years ago. Immunizations:  She is all up to date with her immunizations except for her second Shingrix immunization. Physical Examination:  GENERAL:  Pleasant lady in no acute distress. She appears younger than her stated age. She answers my questions appropriately. VITALS:  BP: initially 144/81, repeated by myself 136/70. P: 60.  O2 sat: 97.  T: 97.9. HEENT:  Normocephalic, atraumatic. S/P bilateral cataract extraction. EOMI. TMs normal.  She does wear bilateral hearing aids. Mouth mucosa pink. Tongue midline. Pharynx normal.  NECK:  Supple without adenopathy, thyromegaly or carotid bruits. CHEST:  Lungs clear to auscultation, no rales or wheezes. CV:  Heart regular rhythm without murmur or gallop. BREASTS:  Symmetrical without mass or nipple discharge. No infra, supra or axillary adenopathy noted. ABDOMEN:  Soft, non tender, no organomegaly or masses. EXTREMITIES:  No edema or calf tenderness. Distal pulses were present. NEUROLOGIC:  Cranial nerves II-XII intact. Excellent strength in the upper and lower extremities against resistance. Sensation is preserved. Romberg is negative. Reflexes 2+ and symmetrical.  She had excellent coordination. Impression:  1. Hyperlipidemia. 2. Hypothyroidism. 3. IBS. 4. Insomnia.   5. Psoriasis and seborrheic dermatitis. 6. Chronic depression/anxiety. 7. GERD. Plan:  1. She was fasting this morning so it was opted to do all of her lab studies. I will call her as soon as I have the results. In the meantime she will continue with her current regimen and I will see her back in September for her Medicare wellness. 2. We discussed the importance of a prudent diet, exercise and weight management to keep her BMI within acceptable level.

## 2019-03-07 NOTE — PROGRESS NOTES
Nadine Michelle  Identified pt with two pt identifiers(name and ). Chief Complaint   Patient presents with   [facility] Annual Wellness Visit       1. Have you been to the ER, urgent care clinic since your last visit? Hospitalized since your last visit? No    2. Have you seen or consulted any other health care providers outside of the [address] [place] [address] since your last visit? Include any pap smears or colon screening. Yes, [address] on 3/6/19 for a Matheus Host treatment on her eye to clear up her vision. Health Maintenance Topics with due status: Overdue       Topic Date Due    Shingrix Vaccine Age 50> 1988    GLAUCOMA SCREENING Q2Y 2003     Health Maintenance Topics with due status: Not Due       Topic Last Completion Date    DTaP/Tdap/Td series 2014    MEDICARE YEARLY EXAM 2018     Health Maintenance Topics with due status: Completed       Topic Last Completion Date    Pneumococcal 65+ Low/Medium Risk 2015    Bone Densitometry (Dexa) Screening 2017    Influenza Age 5 to Adult 10/09/2018           Medication reconciliation up to date and corrected with patient at this time. Today's provider has been notified of reason for visit, vitals and flowsheets obtained on patients. Reviewed record in preparation for visit, huddled with provider and have obtained necessary documentation.         Wt Readings from Last 3 Encounters:   19 126 lb 12.8 oz (57.5 kg)   01/10/19 128 lb 3.2 oz (58.2 kg)   18 129 lb (58.5 kg)     Temp Readings from Last 3 Encounters:   19 97.9 °F (36.6 °C) (Oral)   01/10/19 97.1 °F (36.2 °C) (Oral)   18 98 °F (36.7 °C) (Oral)     BP Readings from Last 3 Encounters:   19 144/81   01/10/19 106/70   18 116/60     Pulse Readings from Last 3 Encounters:   19 60   01/10/19 66   18 64     Vitals:    19 0917   BP: 144/81   Pulse: 60   Resp: 18   Temp: 97.9 °F (36.6 °C)   TempSrc: Oral SpO2: 95%   Weight: 126 lb 12.8 oz (57.5 kg)   Height: 5' (1.524 m)   PainSc:   0 - No pain         Learning Assessment:  :     No flowsheet data found. Depression Screening:  :     3 most recent PHQ Screens 3/7/2019   Little interest or pleasure in doing things Not at all   Feeling down, depressed, irritable, or hopeless Not at all   Total Score PHQ 2 0       No flowsheet data found. Fall Risk Assessment:  :     Fall Risk Assessment, last 12 mths 3/7/2019   Able to walk? Yes   Fall in past 12 months? No       Abuse Screening:  :     Abuse Screening Questionnaire 3/7/2019 8/30/2018   Do you ever feel afraid of your partner? N N   Are you in a relationship with someone who physically or mentally threatens you? N N   Is it safe for you to go home?  Y Y       ADL Screening:  :     ADL Assessment 3/7/2019   Feeding yourself No Help Needed   Getting from bed to chair No Help Needed   Getting dressed No Help Needed   Bathing or showering No Help Needed   Walk across the room (includes cane/walker) No Help Needed   Using the telphone No Help Needed   Taking your medications No Help Needed   Preparing meals No Help Needed   Managing money (expenses/bills) No Help Needed   Moderately strenuous housework (laundry) No Help Needed   Shopping for personal items (toiletries/medicines) No Help Needed   Shopping for groceries No Help Needed   Driving No Help Needed   Climbing a flight of stairs No Help Needed   Getting to places beyond walking distances No Help Needed

## 2019-03-08 LAB
EST. AVERAGE GLUCOSE BLD GHB EST-MCNC: 91 MG/DL
HBA1C MFR BLD: 4.8 % (ref 4.8–5.6)

## 2019-03-09 LAB — BACTERIA UR CULT: ABNORMAL

## 2019-03-11 RX ORDER — CIPROFLOXACIN 500 MG/1
500 TABLET ORAL 2 TIMES DAILY
Qty: 14 TAB | Refills: 0 | Status: SHIPPED | OUTPATIENT
Start: 2019-03-11 | End: 2019-06-11

## 2019-03-12 NOTE — PROGRESS NOTES
Lab results discussed with patient. She has UTI. Start cipro 500 mg twice daily x 7 days. Follow up post treatment for repeat U/A and culture. Otherwise all lab studies normal. Continue current regimen and follow up 3 months. Refer to [facility] for knee pain.

## 2019-03-26 ENCOUNTER — OFFICE VISIT (OUTPATIENT)
Dept: INTERNAL MEDICINE CLINIC | Age: 79
End: 2019-03-26

## 2019-03-26 DIAGNOSIS — N39.0 URINARY TRACT INFECTION WITHOUT HEMATURIA, SITE UNSPECIFIED: Primary | ICD-10-CM

## 2019-03-26 LAB
BACTERIA,BACTU: ABNORMAL
BILIRUB UR QL: NEGATIVE
CLARITY: CLEAR
COLOR UR: ABNORMAL
GLUCOSE 24H UR-MRATE: NEGATIVE G/(24.H)
HGB UR QL STRIP: NEGATIVE
KETONES UR QL STRIP.AUTO: NEGATIVE
LEUKOCYTE ESTERASE: NEGATIVE
NITRITE UR QL STRIP.AUTO: NEGATIVE
PH UR STRIP: 6 [PH] (ref 5–7)
PROT UR STRIP-MCNC: NEGATIVE MG/DL
RBC #/AREA URNS HPF: 0 #/HPF
SP GR UR REFRACTOMETRY: 1.02 (ref 1–1.03)
SQUAMOUS EPITHELIAL CELLS: ABNORMAL
UROBILINOGEN UR QL STRIP.AUTO: NEGATIVE
WBC URNS QL MICRO: ABNORMAL #/HPF

## 2019-03-26 NOTE — PATIENT INSTRUCTIONS
Urinary Tract Infection in Women: Care Instructions  Your Care Instructions    A urinary tract infection, or UTI, is a general term for an infection anywhere between the kidneys and the urethra (where urine comes out). Most UTIs are bladder infections. They often cause pain or burning when you urinate. UTIs are caused by bacteria and can be cured with antibiotics. Be sure to complete your treatment so that the infection goes away. Follow-up care is a key part of your treatment and safety. Be sure to make and go to all appointments, and call your doctor if you are having problems. It's also a good idea to know your test results and keep a list of the medicines you take. How can you care for yourself at home? · Take your antibiotics as directed. Do not stop taking them just because you feel better. You need to take the full course of antibiotics. · Drink extra water and other fluids for the next day or two. This may help wash out the bacteria that are causing the infection. (If you have kidney, heart, or liver disease and have to limit fluids, talk with your doctor before you increase your fluid intake.)  · Avoid drinks that are carbonated or have caffeine. They can irritate the bladder. · Urinate often. Try to empty your bladder each time. · To relieve pain, take a hot bath or lay a heating pad set on low over your lower belly or genital area. Never go to sleep with a heating pad in place. To prevent UTIs  · Drink plenty of water each day. This helps you urinate often, which clears bacteria from your system. (If you have kidney, heart, or liver disease and have to limit fluids, talk with your doctor before you increase your fluid intake.)  · Urinate when you need to. · Urinate right after you have sex. · Change sanitary pads often. · Avoid douches, bubble baths, feminine hygiene sprays, and other feminine hygiene products that have deodorants.   · After going to the bathroom, wipe from front to back.  When should you call for help? Call your doctor now or seek immediate medical care if:    · Symptoms such as fever, chills, nausea, or vomiting get worse or appear for the first time.     · You have new pain in your back just below your rib cage. This is called flank pain.     · There is new blood or pus in your urine.     · You have any problems with your antibiotic medicine.    Watch closely for changes in your health, and be sure to contact your doctor if:    · You are not getting better after taking an antibiotic for 2 days.     · Your symptoms go away but then come back. Where can you learn more? Go to http://yoandy-graham.info/. Enter N935 in the search box to learn more about \"Urinary Tract Infection in Women: Care Instructions. \"  Current as of: March 20, 2018  Content Version: 11.9  © 1764-9083 [employer]. Care instructions adapted under license by [employer] (which disclaims liability or warranty for this information). If you have questions about a medical condition or this instruction, always ask your healthcare professional. [address] any warranty or liability for your use of this information.

## 2019-03-26 NOTE — PROGRESS NOTES
Mariam Munson  Identified pt with two pt identifiers(name and ). Chief Complaint   Patient presents with    URI    Diarrhea     seeing Dr Miguel Borges Leg Pain   Patient here for followup of above problems. 1. Have you been to the ER, urgent care clinic since your last visit? Hospitalized since your last visit? No    2. Have you seen or consulted any other health care providers outside of the [facility] since your last visit? Include any pap smears or colon screening. Yes, Dr Barbara Mclean, leg pain, 19      Medication reconciliation up to date and corrected with patient at this time. Today's provider has been notified of reason for visit, vitals and flowsheets obtained on patients. Reviewed record in preparation for visit, huddled with provider and have obtained necessary documentation. Depression Risk Factor Screening:     3 most recent PHQ Screens 3/7/2019   Little interest or pleasure in doing things Not at all   Feeling down, depressed, irritable, or hopeless Not at all   Total Score PHQ 2 0       Functional Ability and Level of Safety:     Activities of Daily Living  ADL Assessment 3/7/2019   Feeding yourself No Help Needed   Getting from bed to chair No Help Needed   Getting dressed No Help Needed   Bathing or showering No Help Needed   Walk across the room (includes cane/walker) No Help Needed   Using the telphone No Help Needed   Taking your medications No Help Needed   Preparing meals No Help Needed   Managing money (expenses/bills) No Help Needed   Moderately strenuous housework (laundry) No Help Needed   Shopping for personal items (toiletries/medicines) No Help Needed   Shopping for groceries No Help Needed   Driving No Help Needed   Climbing a flight of stairs No Help Needed   Getting to places beyond walking distances No Help Needed       Fall Risk  Fall Risk Assessment, last 12 mths 3/7/2019   Able to walk? Yes   Fall in past 12 months?  No Abuse Screen  Abuse Screening Questionnaire 3/7/2019   Do you ever feel afraid of your partner? N   Are you in a relationship with someone who physically or mentally threatens you? N   Is it safe for you to go home?  Y           Health Maintenance Due   Topic    Shingrix Vaccine Age 49> (1 of 2)    GLAUCOMA SCREENING Q2Y        Wt Readings from Last 3 Encounters:   03/26/19 125 lb (56.7 kg)   03/07/19 126 lb 12.8 oz (57.5 kg)   01/10/19 128 lb 3.2 oz (58.2 kg)     Temp Readings from Last 3 Encounters:   03/26/19 97.9 °F (36.6 °C) (Oral)   03/07/19 97.9 °F (36.6 °C) (Oral)   01/10/19 97.1 °F (36.2 °C) (Oral)     BP Readings from Last 3 Encounters:   03/26/19 143/89   03/07/19 136/70   01/10/19 106/70     Pulse Readings from Last 3 Encounters:   03/26/19 60   03/07/19 60   01/10/19 66     Vitals:    03/26/19 1013   BP: 143/89   Pulse: 60   Temp: 97.9 °F (36.6 °C)   TempSrc: Oral   SpO2: 96%   Weight: 125 lb (56.7 kg)   Height: 4' 10\" (1.473 m)         Patient Care Team   Patient Care Team:  Matteo Cardenas NP as PCP - General (Nurse Practitioner)  Matteo Cardenas NP as PCP - INTERNAL MEDICINE (Nurse Practitioner)

## 2019-03-27 VITALS
SYSTOLIC BLOOD PRESSURE: 132 MMHG | TEMPERATURE: 97.9 F | DIASTOLIC BLOOD PRESSURE: 80 MMHG | HEART RATE: 60 BPM | HEIGHT: 58 IN | OXYGEN SATURATION: 96 % | WEIGHT: 125 LBS | BODY MASS INDEX: 26.24 KG/M2

## 2019-03-27 LAB — BACTERIA UR CULT: ABNORMAL

## 2019-03-27 NOTE — PROGRESS NOTES
Subjective:  Ms. Evette Flores is a pleasant [de-identified]year old lady who comes in today for follow up of UTI. I recently treated her with a seven day course of Cipro 500 mg twice daily. Her urine culture grew greater than 100,000 hemolytic streptococcus. She is doing well from that standpoint. Since her last visit she tells me that she had a cold, but she is finally getting over this. In addition, she saw Dr. Jessica Cox yesterday for right knee pain. Her xrays were normal and he treated her with a steroid injection. She also has a follow up appointment with Dr. Emanuel Edwards next week for follow up of her chronic diarrhea.     Past Medical History:   Diagnosis Date    Annual physical exam 8/3/2017    Anxiety 8/3/2017    Arthralgia 8/3/2017    Arthritis     Arthritis, hip 8/3/2017    Avascular necrosis of femoral head ([facility] left 8/3/2017    Bone loss 8/3/2017    Cancer ([facility]     endometrial    Contusion of left knee, initial encounter 8/3/2017    Degenerative arthritis of lumbar spine 8/3/2017    Depression     Elevated liver function tests 8/3/2017    MIAN (generalized anxiety disorder) 8/3/2017    GERD (gastroesophageal reflux disease)     Glaucoma     Glaucoma 8/3/2017    Headache, acute 8/3/2017    History of colonoscopy with polypectomy 8/3/2017    History of endometrial cancer 8/3/2017    Hyperlipidemia 8/3/2017    Hypothyroid 8/3/2017    IBS (irritable bowel syndrome) 8/3/2017    AMPARO (iron deficiency anemia) 8/3/2017    Insomnia 8/3/2017    Long-term use of immunosuppressant medication 8/3/2017    Medication side effect 8/3/2017    Melena 8/3/2017    Muscle spasm 8/3/2017    Osteoporosis 8/3/2017    Other ill-defined conditions(799.89)     high cholesterol    Other ill-defined conditions(799.89)     psoriasis    Post-menopausal 8/3/2017    Psoriasis 8/3/2017    Pyuria, sterile 8/3/2017    Rash 8/3/2017    Rosacea blepharoconjunctivitis 8/3/2017    Sciatica 8/3/2017    Somatic dysfunction of cervical region 8/3/2017    Spondylosis of cervical region without myelopathy or radiculopathy 8/3/2017    Thrush 8/3/2017    Thyroid disease     Urticaria 8/3/2017     Past Surgical History:   Procedure Laterality Date    COLORECTAL SCRN; HI RISK IND  4/2/2014         HX APPENDECTOMY      HX CATARACT REMOVAL      right    HX GYN      hysterectomy    HX HEENT      bilateral ear    HX HEENT      LASIK    HX ORTHOPAEDIC  1-2011    lumbar lamenectomy    HX OTHER SURGICAL      left cateract extraction with lens implant/ BILATERAL LASIK    HX TONSILLECTOMY      NEUROLOGICAL PROCEDURE UNLISTED  2011    spinal fusion       Current Outpatient Medications on File Prior to Visit   Medication Sig Dispense Refill    escitalopram oxalate (LEXAPRO) 20 mg tablet TAKE 1 TABLET BY MOUTH EVERY DAY 90 Tab 1    estrogens, conjugated, (PREMARIN) 0.45 mg tablet Premarin 0.45 mg tablet   1 Tab PO Daily for 3 months.  dorzolamide-timolol (COSOPT) 22.3-6.8 mg/mL ophthalmic solution Cosopt 22.3 mg-6.8 mg/mL eye drops   Prescribed by non 606/706 Marion Del Rosario MD      Lactobacillus acidophilus (PROBIOTIC PO) Take  by mouth.  polyethylene glycol (MIRALAX) 17 gram/dose powder Miralax 17 gram/dose oral powder   Prescribed by dariana 606/706 Marion Del Rosario MD      levothyroxine (SYNTHROID) 88 mcg tablet TAKE ONE TABLET BY MOUTH ONE TIME DAILY  90 Tab 1    omeprazole (PRILOSEC OTC) 20 mg tablet Take 20 mg by mouth daily.       atorvastatin (LIPITOR) 40 mg tablet TAKE 1 TABLET BY MOUTH DAILY  90 Tab 2    folic acid (FOLVITE) 1 mg tablet TAKE TWO TABLETS BY MOUTH DAILY  200 Tab 2    methotrexate (RHEUMATREX) 2.5 mg tablet 2 (TWO) TABLET, ORAL, WEEKLY 20 Tab 10    dicyclomine (BENTYL) 10 mg capsule TAKE 1 CAPSULE BY MOUTH 4 TIMES A  Cap 3    PREMARIN 0.625 mg tablet TAKE 1 TABLET BY MOUTH EVERY DAY 90 Tab 2    diphenoxylate-atropine (LOMOTIL) 2.5-0.025 mg per tablet Take 1 Tab by mouth four (4) times daily as needed for Diarrhea. Max Daily Amount: 4 Tabs. 40 Tab 0    naproxen sodium (ALEVE) 220 mg tablet Take 220 mg by mouth two (2) times daily (with meals).  travoprost (TRAVATAN Z) 0.004 % ophthalmic solution Administer 1 Drop to both eyes nightly.  MAGNESIUM PO Take 500 mg by mouth daily.  lysine (L-LYSINE) 500 mg cap Take 500 mg by mouth daily.  CHOLECALCIFEROL, VITAMIN D3, (VITAMIN D-3 PO) Take 1 Tab by mouth daily.  CETIRIZINE HCL (ZYRTEC PO) Take 10 mg by mouth daily.  ciprofloxacin HCl (CIPRO) 500 mg tablet Take 1 Tab by mouth two (2) times a day. 14 Tab 0    clobetasol (TEMOVATE) 0.05 % ointment       doxepin (SINEQUAN) 10 mg capsule TAKE TWO CAPSULES BY MOUTH DAILY AT BEDTIME  (Patient not taking: Reported on 3/7/2019) 60 Cap 3    valACYclovir (VALTREX) 1 gram tablet Take 1 Tab by mouth three (3) times daily. 21 Tab 0    clotrimazole (MYCELEX) 10 mg joe Take 1 Tab by mouth five (5) times daily. (Patient not taking: Reported on 1/10/2019) 70 Tab 2    prednisoLONE acetate (PRED FORTE) 1 % ophthalmic suspension Administer 1 Drop to both eyes four (4) times daily. (Patient not taking: Reported on 1/10/2019) 10 mL 6    neomycin-polymyxin-hydrocortisone, buffered, (PEDIOTIC) 3.5-10,000-1 mg/mL-unit/mL-% otic suspension Administer 4 Drops into each ear three (3) times daily. (Patient not taking: Reported on 1/10/2019) 10 mL 6    triamcinolone acetonide (KENALOG) 0.1 % topical cream Apply  to affected area two (2) times a day. use thin layer      cholecalciferol, vitamin D3, (VITAMIN D3) 2,000 unit tab Take  by mouth.  FLAXSEED OIL (OMEGA 3 PO) Take  by mouth.  ibuprofen (ADVIL) 200 mg tablet Take 400 mg by mouth every six (6) hours as needed.  BRIMONIDINE TARTRATE/TIMOLOL (COMBIGAN OP) Apply  to eye every morning.  levothyroxine (SYNTHROID) 100 mcg tablet Take 100 mcg by mouth Daily (before breakfast).  METHOTREXATE Take  by mouth Every Friday.       CALCIUM CARBONATE/VITAMIN D3 (CALTRATE-600 + D VIT D3, 800, PO) Take  by mouth daily.  minocycline (MINOCIN, DYNACIN) 100 mg capsule Take 100 mg by mouth daily.  evening primrose oil 500 mg cap Take  by mouth daily.  Aspirin, Buffered 81 mg tab Take  by mouth daily.  cyclobenzaprine (FLEXERIL) 10 mg tablet Take 1 Tab by mouth three (3) times daily as needed for Muscle Spasm(s) (neck pain). 60 Tab 0    METHYLCELLULOSE (CITRUCEL PO) Take 1 Tab by mouth daily.  lorazepam (ATIVAN) 0.5 mg tablet Take 0.5 mg by mouth as needed. No current facility-administered medications on file prior to visit. Allergies   Allergen Reactions    Cephalosporins Shortness of Breath    Codeine Nausea and Vomiting    Pcn [Penicillins] Rash    Sulfa (Sulfonamide Antibiotics) Unknown (comments)   Physical Examination:  GENERAL:  Pleasant lady in no acute distress. She is alert and oriented. VITALS:  BP: initially 143/89, repeated by myself is 132/80. P: 60.  T: 97.9. O2 sat: 96%. HEENT:  Normocephalic, atraumatic. NECK:  Supple without adenopathy. CHEST:  Lungs clear to auscultation. CV:  Heart regular rhythm. EXTREMITIES:  No edema or calf tenderness. Distal pulses were present. Impression:  1. Status post UTI. 2. Right knee pain, improving. 3. Chronic diarrhea. Plan:  1. It was opted to repeat a UA and urine culture and I will call her as soon as I have the results. Otherwise she will follow up in six months.

## 2019-05-24 RX ORDER — ATORVASTATIN CALCIUM 40 MG/1
TABLET, FILM COATED ORAL
Qty: 90 TAB | Refills: 3 | Status: SHIPPED | OUTPATIENT
Start: 2019-05-24 | End: 2021-08-11 | Stop reason: ALTCHOICE

## 2019-05-24 NOTE — TELEPHONE ENCOUNTER
Requested Prescriptions     Pending Prescriptions Disp Refills    atorvastatin (LIPITOR) 40 mg tablet 90 Tab 3     Sig: TAKE 1 TABLET BY MOUTH DAILY       Last Refill: 2-25-19  Last visit:3-26-19

## 2019-06-04 RX ORDER — LEVOTHYROXINE SODIUM 88 UG/1
TABLET ORAL
Qty: 90 TAB | Refills: 0 | Status: SHIPPED | OUTPATIENT
Start: 2019-06-04 | End: 2019-06-11

## 2019-06-10 RX ORDER — FOLIC ACID 1 MG/1
TABLET ORAL
Qty: 180 TAB | Refills: 2 | Status: SHIPPED | OUTPATIENT
Start: 2019-06-10 | End: 2020-03-05

## 2019-06-10 NOTE — TELEPHONE ENCOUNTER
.  PCP: Savanna Sandhu NP    Last appt: 3/26/2019  Future Appointments   Date Time Provider Neri Cole   9/9/2019  9:15 AM Ophelia Aguilera NP [facility]       Requested Prescriptions     Pending Prescriptions Disp Refills    folic acid (FOLVITE) 1 mg tablet 180 Tab 2     Sig: TAKE TWO TABLETS BY MOUTH DAILY       Prior labs and Blood pressures:  BP Readings from Last 3 Encounters:   03/27/19 132/80   03/07/19 136/70   01/10/19 106/70     Lab Results   Component Value Date/Time    Sodium 139 03/07/2019 10:11 AM    Potassium 4.7 03/07/2019 10:11 AM    Chloride 104 03/07/2019 10:11 AM    CO2 29.0 03/07/2019 10:11 AM    Anion gap 6 03/07/2019 10:11 AM    Glucose 82 03/07/2019 10:11 AM    BUN 17.0 03/07/2019 10:11 AM    Creatinine 0.9 03/07/2019 10:11 AM    BUN/Creatinine ratio 19 03/07/2019 10:11 AM    GFR est AA >60 03/07/2019 10:11 AM    GFR est non-AA >60 03/07/2019 10:11 AM    Calcium 10.4 (H) 03/07/2019 10:11 AM     Lab Results   Component Value Date/Time    Hemoglobin A1c 4.8 03/07/2019 10:09 AM     Lab Results   Component Value Date/Time    Cholesterol, total 158 03/07/2019 10:11 AM    Cholesterol (POC) 175.0 08/23/2018 08:20 AM    HDL Cholesterol 85 03/07/2019 10:11 AM    HDL Cholesterol (POC) 108.0 08/23/2018 08:20 AM    LDL Cholesterol (POC) 46.8 08/23/2018 08:20 AM    LDL, calculated 41 03/07/2019 10:11 AM    VLDL 32 03/07/2019 10:11 AM    Triglyceride 160 03/07/2019 10:11 AM    Triglycerides (POC) 101.0 08/23/2018 08:20 AM    CHOL/HDL Ratio 2 03/07/2019 10:11 AM     Lab Results   Component Value Date/Time    VITAMIN D, 25-HYDROXY 59 03/07/2019 10:11 AM       Lab Results   Component Value Date/Time    TSH, 3rd generation 1.79 03/07/2019 10:11 AM

## 2019-06-12 ENCOUNTER — HOSPITAL ENCOUNTER (OUTPATIENT)
Age: 79
Setting detail: OUTPATIENT SURGERY
Discharge: HOME | End: 2019-06-12
Attending: INTERNAL MEDICINE | Admitting: INTERNAL MEDICINE
Payer: MEDICARE

## 2019-06-12 ENCOUNTER — ANESTHESIA EVENT (OUTPATIENT)
Dept: ENDOSCOPY | Age: 79
End: 2019-06-12
Payer: MEDICARE

## 2019-06-12 ENCOUNTER — ANESTHESIA (OUTPATIENT)
Dept: ENDOSCOPY | Age: 79
End: 2019-06-12
Payer: MEDICARE

## 2019-06-12 VITALS
HEIGHT: 59 IN | HEART RATE: 59 BPM | RESPIRATION RATE: 12 BRPM | TEMPERATURE: 97.6 F | OXYGEN SATURATION: 100 % | DIASTOLIC BLOOD PRESSURE: 73 MMHG | BODY MASS INDEX: 25.61 KG/M2 | SYSTOLIC BLOOD PRESSURE: 134 MMHG | WEIGHT: 127.06 LBS

## 2019-06-12 PROCEDURE — 74011250637 HC RX REV CODE- 250/637: Performed by: INTERNAL MEDICINE

## 2019-06-12 PROCEDURE — 76060000031 HC ANESTHESIA FIRST 0.5 HR: Performed by: INTERNAL MEDICINE

## 2019-06-12 PROCEDURE — 74011250636 HC RX REV CODE- 250/636: Performed by: INTERNAL MEDICINE

## 2019-06-12 PROCEDURE — 74011250636 HC RX REV CODE- 250/636

## 2019-06-12 PROCEDURE — 76040000019: Performed by: INTERNAL MEDICINE

## 2019-06-12 PROCEDURE — 77030021593 HC FCPS BIOP ENDOSC BSC -A: Performed by: INTERNAL MEDICINE

## 2019-06-12 PROCEDURE — 88305 TISSUE EXAM BY PATHOLOGIST: CPT

## 2019-06-12 RX ORDER — FLUMAZENIL 0.1 MG/ML
0.2 INJECTION INTRAVENOUS
Status: DISCONTINUED | OUTPATIENT
Start: 2019-06-12 | End: 2019-06-12 | Stop reason: HOSPADM

## 2019-06-12 RX ORDER — SODIUM CHLORIDE 0.9 % (FLUSH) 0.9 %
5-40 SYRINGE (ML) INJECTION AS NEEDED
Status: DISCONTINUED | OUTPATIENT
Start: 2019-06-12 | End: 2019-06-12 | Stop reason: HOSPADM

## 2019-06-12 RX ORDER — NALOXONE HYDROCHLORIDE 0.4 MG/ML
0.4 INJECTION, SOLUTION INTRAMUSCULAR; INTRAVENOUS; SUBCUTANEOUS
Status: DISCONTINUED | OUTPATIENT
Start: 2019-06-12 | End: 2019-06-12 | Stop reason: HOSPADM

## 2019-06-12 RX ORDER — LIDOCAINE HYDROCHLORIDE 20 MG/ML
INJECTION, SOLUTION EPIDURAL; INFILTRATION; INTRACAUDAL; PERINEURAL AS NEEDED
Status: DISCONTINUED | OUTPATIENT
Start: 2019-06-12 | End: 2019-06-12 | Stop reason: HOSPADM

## 2019-06-12 RX ORDER — ATROPINE SULFATE 0.1 MG/ML
0.5 INJECTION INTRAVENOUS
Status: DISCONTINUED | OUTPATIENT
Start: 2019-06-12 | End: 2019-06-12 | Stop reason: HOSPADM

## 2019-06-12 RX ORDER — FENTANYL CITRATE 50 UG/ML
50 INJECTION, SOLUTION INTRAMUSCULAR; INTRAVENOUS
Status: DISCONTINUED | OUTPATIENT
Start: 2019-06-12 | End: 2019-06-12 | Stop reason: HOSPADM

## 2019-06-12 RX ORDER — MIDAZOLAM HYDROCHLORIDE 1 MG/ML
.25-5 INJECTION, SOLUTION INTRAMUSCULAR; INTRAVENOUS
Status: DISCONTINUED | OUTPATIENT
Start: 2019-06-12 | End: 2019-06-12 | Stop reason: HOSPADM

## 2019-06-12 RX ORDER — DEXTROMETHORPHAN/PSEUDOEPHED 2.5-7.5/.8
1.2 DROPS ORAL
Status: DISCONTINUED | OUTPATIENT
Start: 2019-06-12 | End: 2019-06-12 | Stop reason: HOSPADM

## 2019-06-12 RX ORDER — SODIUM CHLORIDE 9 MG/ML
100 INJECTION, SOLUTION INTRAVENOUS CONTINUOUS
Status: DISCONTINUED | OUTPATIENT
Start: 2019-06-12 | End: 2019-06-12 | Stop reason: HOSPADM

## 2019-06-12 RX ORDER — PROPOFOL 10 MG/ML
INJECTION, EMULSION INTRAVENOUS AS NEEDED
Status: DISCONTINUED | OUTPATIENT
Start: 2019-06-12 | End: 2019-06-12 | Stop reason: HOSPADM

## 2019-06-12 RX ORDER — SODIUM CHLORIDE 0.9 % (FLUSH) 0.9 %
5-40 SYRINGE (ML) INJECTION EVERY 8 HOURS
Status: DISCONTINUED | OUTPATIENT
Start: 2019-06-12 | End: 2019-06-12 | Stop reason: HOSPADM

## 2019-06-12 RX ADMIN — LIDOCAINE HYDROCHLORIDE 40 MG: 20 INJECTION, SOLUTION EPIDURAL; INFILTRATION; INTRACAUDAL; PERINEURAL at 08:58

## 2019-06-12 RX ADMIN — PROPOFOL 150 MG: 10 INJECTION, EMULSION INTRAVENOUS at 09:16

## 2019-06-12 RX ADMIN — SODIUM CHLORIDE 100 ML/HR: 900 INJECTION, SOLUTION INTRAVENOUS at 08:56

## 2019-06-12 RX ADMIN — SIMETHICONE 80 MG: 20 SUSPENSION/ DROPS ORAL at 09:07

## 2019-06-12 NOTE — H&P
Yasmeen Daly MD  [facility], [address] [place] [address]  [phone]  [email or web address]    Gastroenterology Outpatient History and Physical    Patient: Nadine LAKHANI Galen Hernandezton    Physician: Douglas Lemos MD    Vital Signs: Blood pressure 141/81, pulse 61, temperature 97.9 °F (36.6 °C), resp. rate 15, height 4' 11\" (1.499 m), weight 57.6 kg (127 lb 1 oz), SpO2 99 %. Allergies: Allergies   Allergen Reactions    Cephalosporins Shortness of Breath    Codeine Nausea and Vomiting    Pcn [Penicillins] Rash       Chief Complaint: Screening colonoscopy    History of Present Illness: Here for a screening colonoscopy. Last colonoscopy was 5 years ago  and showed no polyps. Currently has some recurrence of diarrhea. Positive FH of colon cancer.     History:  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    Depression     Elevated liver function tests 8/3/2017    MIAN (generalized anxiety disorder) 8/3/2017    GERD (gastroesophageal reflux disease)     Glaucoma     Glaucoma 8/3/2017    Headache, acute 8/3/2017    History of colonoscopy with polypectomy 8/3/2017    History of endometrial cancer 8/3/2017    Hyperlipidemia 8/3/2017    Hypothyroid 8/3/2017    IBS (irritable bowel syndrome) 8/3/2017    AMPARO (iron deficiency anemia) 8/3/2017    Insomnia 8/3/2017    Long-term use of immunosuppressant medication 8/3/2017    Medication side effect 8/3/2017    Melena 8/3/2017    Muscle spasm 8/3/2017    Osteoporosis 8/3/2017    Other ill-defined conditions(799.89)     high cholesterol    Other ill-defined conditions(799.89)     psoriasis    Post-menopausal 8/3/2017    Psoriasis 8/3/2017    Pyuria, sterile 8/3/2017    Rash 8/3/2017    Rosacea blepharoconjunctivitis 8/3/2017    Sciatica 8/3/2017    Somatic dysfunction of cervical region 8/3/2017    Spondylosis of cervical region without myelopathy or radiculopathy 8/3/2017    Thrush 8/3/2017    Thyroid disease     Urticaria 8/3/2017      Past Surgical History:   Procedure Laterality Date    COLORECTAL SCRN; HI RISK IND  2014         HX APPENDECTOMY      HX GYN      hysterectomy    HX HEENT      bilateral ear    HX HEENT      LASIK    HX ORTHOPAEDIC  -    lumbar laminectomy    HX OTHER SURGICAL Bilateral     cataract extraction with lens implant    HX TONSILLECTOMY      NEUROLOGICAL PROCEDURE UNLISTED      spinal fusion      Social History     Socioeconomic History    Marital status:      Spouse name: Not on file    Number of children: Not on file    Years of education: Not on file    Highest education level: Not on file   Tobacco Use    Smoking status: Former Smoker     Last attempt to quit: 1979     Years since quittin.4    Smokeless tobacco: Never Used   Substance and Sexual Activity    Alcohol use:  Yes     Alcohol/week: 4.2 oz     Types: 7 Glasses of wine per week    Drug use: No      Family History   Problem Relation Age of Onset    Heart Disease Mother     Other Mother         kidney stones    Diabetes Father     Cancer Sister         colon cancer    Heart Disease Brother     Diabetes Brother     Other Brother         kidney stones    Colon Cancer Brother     Heart Disease Brother     Other Brother         kidney stones    Heart Disease Brother     Heart Disease Brother     Cancer Brother         melanoma      Patient Active Problem List   Diagnosis Code    History of endometrial cancer Z85.42    History of colonoscopy with polypectomy Z98.890, Z86.010    Thrush B37.0    Spondylosis of cervical region without myelopathy or radiculopathy M47.812    Arthritis of right hip M16.11    Rash R21    Osteoporosis M81.0    Annual physical exam Z00.00    Hypothyroid E03.9    Hyperlipidemia E78.5    Post-menopausal Z78.0    Contusion of left knee, initial encounter S80. 02XA    Degenerative arthritis of lumbar spine M47.816    IBS (irritable bowel syndrome) K58.9    Long-term use of immunosuppressant medication Z79.899    Anxiety F41.9    Muscle spasm M62.838    Avascular necrosis of femoral head (HCC), left M87.059    Pyuria, sterile N39.0    Somatic dysfunction of cervical region M99.01    Elevated liver function tests R94.5    AMPARO (iron deficiency anemia) D50.9    MIAN (generalized anxiety disorder) F41.1    Medication side effect T88. 7XXA    Arthralgia M25.50    Rosacea blepharoconjunctivitis H10.829    Insomnia G47.00    Melena K92.1    Headache, acute R51    Psoriasis L40.9    Sciatica M54.30    Urticaria L50.9    Glaucoma H40.9    Bone loss M89.8X9    Chronic eczematous otitis externa of both ears H60.8X3    Dyslipidemia E78.5    Monilial vaginitis B37.3    Labral tear of hip, degenerative M16.9    Herpes zoster without complication O46.4    Epigastric pain R10.13    GERD (gastroesophageal reflux disease) K21.9    Acute recurrent pansinusitis J01.41       Medications:   Prior to Admission medications    Medication Sig Start Date End Date Taking? Authorizing Provider   folic acid (FOLVITE) 1 mg tablet TAKE TWO TABLETS BY MOUTH DAILY 6/10/19  Yes Casey Aguilera R, NP   atorvastatin (LIPITOR) 40 mg tablet TAKE 1 TABLET BY MOUTH DAILY 5/24/19  Yes Rula Rose MD   escitalopram oxalate (LEXAPRO) 20 mg tablet TAKE 1 TABLET BY MOUTH EVERY DAY 1/11/19  Yes Rula Rose MD   estrogens, conjugated, (PREMARIN) 0.45 mg tablet Premarin 0.45 mg tablet   1 Tab PO Daily for 3 months.    Yes Provider, Historical   dorzolamide-timolol (COSOPT) 22.3-6.8 mg/mL ophthalmic solution Cosopt 22.3 mg-6.8 mg/mL eye drops   Prescribed by [facility] MD 5/12/17  Yes Provider, Historical   Lactobacillus acidophilus (PROBIOTIC PO) Take  by mouth. Yes Provider, Historical   clobetasol (TEMOVATE) 0.05 % ointment  10/23/18  Yes Provider, Historical   omeprazole (PRILOSEC OTC) 20 mg tablet Take 20 mg by mouth daily. Yes Provider, Historical   methotrexate (RHEUMATREX) 2.5 mg tablet 2 (TWO) TABLET, ORAL, WEEKLY  Patient taking differently: 2 (TWO) TABLET, ORAL, WEEKLY - pt states she takes this for psoriasis, prescribed by dermatologist Nathan Vogel, has taken this long-term and has never been instructed to d/c prior to a procedure 7/22/18  Yes Padmini Presley MD   dicyclomine (BENTYL) 10 mg capsule TAKE 1 CAPSULE BY MOUTH 4 TIMES A DAY  Patient taking differently: TAKE 1 CAPSULE BY MOUTH 4 TIMES A DAY prn 1/15/18  Yes Padmini Presley MD   PREMARIN 0.625 mg tablet TAKE 1 TABLET BY MOUTH EVERY DAY 12/28/17  Yes Ellis Schmitt MD   diphenoxylate-atropine (LOMOTIL) 2.5-0.025 mg per tablet Take 1 Tab by mouth four (4) times daily as needed for Diarrhea. Max Daily Amount: 4 Tabs. 11/29/17  Yes Padmini Presley MD   naproxen sodium (ALEVE) 220 mg tablet Take 220 mg by mouth two (2) times daily (with meals). Yes Provider, Historical   levothyroxine (SYNTHROID) 100 mcg tablet Take 100 mcg by mouth Daily (before breakfast). Yes Provider, Historical   travoprost (TRAVATAN Z) 0.004 % ophthalmic solution Administer 1 Drop to both eyes nightly. Yes Provider, Historical   MAGNESIUM PO Take 500 mg by mouth daily. Yes Provider, Historical   cyclobenzaprine (FLEXERIL) 10 mg tablet Take 1 Tab by mouth three (3) times daily as needed for Muscle Spasm(s) (neck pain). 10/25/11  Yes Evelin Dodd MD   CETIRIZINE HCL (ZYRTEC PO) Take 10 mg by mouth daily.    Yes Provider, Historical   minocycline (Edd Mcdonnellt) 100 mg capsule Take 100 mg by mouth daily. Provider, Historical   lysine (L-LYSINE) 500 mg cap Take 500 mg by mouth daily as needed (cold sores). Provider, Historical   lorazepam (ATIVAN) 0.5 mg tablet Take 0.5 mg by mouth as needed. Provider, Historical       Physical Exam:     General: well developed, well nourished   HEENT: unremarkable   Heart: regular rhythm no mumur    Lungs: clear   Abdominal:  benign   Neurological: unremarkable   Extremities: no edema     Findings/Diagnosis: Screening colonoscopy. FH of colon cancer in siblings.  Recurrent diarrhea---rule out microscopic colitis    Plan of Care/Planned Procedure: Colonoscopy with monitored anesthesia care sedation    Signed:  Immanuel Vanegas MD 6/12/2019

## 2019-06-12 NOTE — ANESTHESIA PREPROCEDURE EVALUATION
Anesthetic History   No history of anesthetic complications            Review of Systems / Medical History  Patient summary reviewed, nursing notes reviewed and pertinent labs reviewed    Pulmonary  Within defined limits                 Neuro/Psych         Psychiatric history     Cardiovascular              Hyperlipidemia    Exercise tolerance: >4 METS     GI/Hepatic/Renal     GERD: well controlled           Endo/Other      Hypothyroidism: well controlled  Arthritis     Other Findings   Comments: Psoriasis           Physical Exam    Airway  Mallampati: I  TM Distance: > 6 cm  Neck ROM: normal range of motion   Mouth opening: Normal     Cardiovascular  Regular rate and rhythm,  S1 and S2 normal,  no murmur, click, rub, or gallop             Dental    Dentition: Caps/crowns     Pulmonary  Breath sounds clear to auscultation               Abdominal  GI exam deferred       Other Findings            Anesthetic Plan    ASA: 3  Anesthesia type: total IV anesthesia and general          Induction: Intravenous  Anesthetic plan and risks discussed with: Patient      Propofol MAC

## 2019-06-12 NOTE — DISCHARGE INSTRUCTIONS
Tobi Herr MD  [facility], [address]  [place], [address] [facility]  [phone]  [email or web address]    June B Estela Neri  [id number]  1940    COLON DISCHARGE INSTRUCTIONS  Discomfort:  Redness at IV site- apply warm compress to area; if redness or soreness persist- contact your physician  There may be a slight amount of blood passed from the rectum  Gaseous discomfort- walking, belching will help relieve any discomfort  You may not operate a vehicle for 12 hours  You may not engage in an occupation involving machinery or appliances for rest of today  You may not drink alcoholic beverages for at least 12 hours  Avoid making any critical decisions for at least 24 hour  DIET:   High fiber diet. - however -  remember your colon is empty and a heavy meal will produce gas. Avoid these foods:  vegetables, fried / greasy foods, carbonated drinks for today      ACTIVITY:  You may resume your normal daily activities it is recommended that you spend the remainder of the day resting -  avoid any strenuous activity. CALL M.D. ANY SIGN OF:   Increasing pain, nausea, vomiting  Abdominal distension (swelling)  New increased bleeding (oral or rectal)  Fever (chills)  Pain in chest area  Bloody discharge from nose or mouth  Shortness of breath     COLONOSCOPY FINDINGS:  Your colonoscopy showed: normal mucosa which was biopsied to check for microscopic colitis. Also have mild diverticulosis and some internal hemorrhoids. Follow-up Instructions:   Call Dr. Tobi Herr if any questions or problems. Telephone # [phone]  Dr. Burr [place] office will notify you of the biopsy results within 7 to 10 days. Should have a repeat colonoscopy in *** years.

## 2019-06-12 NOTE — PROCEDURES
[facility]                  Colonoscopy Operative Report    6/12/2019 June B Keshawn Trent  [id number]  1940    Procedure Type:   Colonoscopy --screening     Indications:    Diarrhea, Family history of coloretal cancer (screening only)     Pre-operative Diagnosis: see indication above    Post-operative Diagnosis:  See findings below    :  Meaghan Kathleen MD    Referring Provider: Marcelle Wheatley NP      Sedation:  MAC anesthesia Propofol    Pre-Procedural Exam:      Airway: clear,  No airway problems anticipated  Heart: RRR, without gallops or rubs  Lungs: clear bilaterally without wheezes, crackles, or rhonchi  Abdomen: soft, nontender, nondistended, bowel sounds present  Mental Status: awake, alert and oriented to person, place and time     Procedure Details:  After informed consent was obtained with all risks and benefits of procedure explained and preoperative exam completed, the patient was taken to the endoscopy suite and placed in the left lateral decubitus position. Upon sequential sedation as per above, a digital rectal exam was performed . The Olympus videocolonoscope  was inserted in the rectum and carefully advanced to the cecum, which was identified by the ileocecal valve and appendiceal orifice. The cecum was identified by the ileocecal valve and appendiceal orifice. The quality of preparation was good. The colonoscope was slowly withdrawn with careful evaluation between folds. Retroflexion in the rectum was completed demonstrating internal hemorrhoids. Findings:   Rectum: Grade 1 internal hemorrhoid(s);   Sigmoid:     - Diverticulosis  Descending Colon: normal mucosa--biopsied for microscopic colitis  Transverse Colon: normal  Ascending Colon:  normal mucosa--biopsied for microscopic colitis  Cecum: normal  Terminal Ileum: not intubated      Specimen Removed:  1. biopsies of ascending colon  2. biopsies of descending colon    Complications: None. EBL:  None. Impression:    normal colonic mucosa throughout  diverticulosis,  Mild in degree, involving the sigmoid  hemorrhoids internal, Moderate in size    Recommendations: --Await pathology. , -No repeat screening colonoscopy indicated due to age. High fiber diet. Resume normal medication(s). Discharge Disposition:  Home in the company of a  when able to ambulate. Pierre Fernández MD    6/12/2019     ELMO Nunez MD  [facility], [address] [place] [address]  [phone]  [email or web address]

## 2019-06-12 NOTE — PROGRESS NOTES
June B Santosh Olivares  1940  [id number]    Situation:  Verbal report received from: Panfilo Calderón RN  Procedure: Procedure(s):  COLONOSCOPY  COLON BIOPSY    Background:    Preoperative diagnosis: DIARRHEA, FAMILY HX OF COLON CANCER  Postoperative diagnosis: hemorrhoids, diverticulosis    :  Dr. Mamie Davila  Assistant(s): Endoscopy Technician-1: Juan A Crum  Endoscopy RN-1: Hunter Scott RN    Specimens:   ID Type Source Tests Collected by Time Destination   1 : ascending colon biopsy Preservative Colon, Ascending  Tyrone Coleman MD 6/12/2019 0913 Pathology   2 : descending colon biopsy Preservative Colon, Descending  Tyrone Coleman MD 6/12/2019 0915 Pathology     H. Pylori  no    Assessment:  Anesthesia gave intra-procedure sedation and medications, see anesthesia flow sheet yes    Intravenous fluids: NS@ KVO     Vital signs stable      Abdominal assessment: round and soft      Recommendation:  Discharge patient per MD order   Family or Friend    Permission to share finding with family or friend yes

## 2019-06-12 NOTE — ANESTHESIA POSTPROCEDURE EVALUATION
Procedure(s):  COLONOSCOPY  COLON BIOPSY. total IV anesthesia    Anesthesia Post Evaluation        Patient location during evaluation: PACU  Note status: Adequate. Level of consciousness: responsive to verbal stimuli and sleepy but conscious  Pain management: satisfactory to patient  Airway patency: patent  Anesthetic complications: no  Cardiovascular status: acceptable  Respiratory status: acceptable  Hydration status: acceptable  Comments: +Post-Anesthesia Evaluation and Assessment    Patient: June B Stacie Recinos MRN: [id number]  SSN: [id number]   YOB: 1940  Age: 78 y.o. Sex: female      Cardiovascular Function/Vital Signs    /73   Pulse (!) 59   Temp 36.4 °C (97.6 °F)   Resp 12   Ht 4' 11\" (1.499 m)   Wt 57.6 kg (127 lb 1 oz)   SpO2 100%   Breastfeeding? No   BMI 25.66 kg/m²     Patient is status post Procedure(s):  COLONOSCOPY  COLON BIOPSY. Nausea/Vomiting: Controlled. Postoperative hydration reviewed and adequate. Pain:  Pain Scale 1: Numeric (0 - 10) (06/12/19 1095)  Pain Intensity 1: 0 (06/12/19 5435)   Managed. Neurological Status: At baseline. Mental Status and Level of Consciousness: Arousable. Pulmonary Status:   O2 Device: Room air (06/12/19 2390)   Adequate oxygenation and airway patent. Complications related to anesthesia: None    Post-anesthesia assessment completed. No concerns. Signed By: Rodriguez Chambers MD    6/12/2019  Post anesthesia nausea and vomiting:  controlled      Vitals Value Taken Time   /73 6/12/2019  9:41 AM   Temp 36.4 °C (97.6 °F) 6/12/2019  9:28 AM   Pulse 59 6/12/2019  9:42 AM   Resp 16 6/12/2019  9:42 AM   SpO2 99 % 6/12/2019  9:42 AM   Vitals shown include unvalidated device data.

## 2019-07-23 ENCOUNTER — LAB ONLY (OUTPATIENT)
Dept: INTERNAL MEDICINE CLINIC | Age: 79
End: 2019-07-23

## 2019-07-23 DIAGNOSIS — N39.0 URINARY TRACT INFECTION WITHOUT HEMATURIA, SITE UNSPECIFIED: Primary | ICD-10-CM

## 2019-07-23 LAB
BACTERIA,BACTU: ABNORMAL
BILIRUB UR QL: NEGATIVE
CLARITY: ABNORMAL
COLOR UR: ABNORMAL
GLUCOSE 24H UR-MRATE: NEGATIVE G/(24.H)
HGB UR QL STRIP: ABNORMAL
KETONES UR QL STRIP.AUTO: NEGATIVE
LEUKOCYTE ESTERASE: ABNORMAL
NITRITE UR QL STRIP.AUTO: ABNORMAL
PH UR STRIP: 6 [PH] (ref 5–7)
PROT UR STRIP-MCNC: ABNORMAL MG/DL
RBC #/AREA URNS HPF: ABNORMAL #/HPF
SP GR UR REFRACTOMETRY: 1.01 (ref 1–1.03)
UROBILINOGEN UR QL STRIP.AUTO: NEGATIVE
WBC URNS QL MICRO: ABNORMAL #/HPF

## 2019-07-23 RX ORDER — NITROFURANTOIN 25; 75 MG/1; MG/1
100 CAPSULE ORAL 2 TIMES DAILY
Qty: 14 CAP | Refills: 0 | Status: SHIPPED | OUTPATIENT
Start: 2019-07-23 | End: 2019-09-09 | Stop reason: ALTCHOICE

## 2019-07-23 NOTE — PROGRESS NOTES
Result of U/A discussed with patient. She has UTI. Start Macrobid 100 mg twice daily x 7 days. Increase fluid.

## 2019-07-25 LAB — BACTERIA UR CULT: ABNORMAL

## 2019-07-30 DIAGNOSIS — N39.0 URINARY TRACT INFECTION WITHOUT HEMATURIA, SITE UNSPECIFIED: Primary | ICD-10-CM

## 2019-07-30 NOTE — PROGRESS NOTES
Result of urine culture discuss with patient. She is feeling better. Follow up for repeat U/A and culture.

## 2019-07-31 ENCOUNTER — LAB ONLY (OUTPATIENT)
Dept: INTERNAL MEDICINE CLINIC | Age: 79
End: 2019-07-31

## 2019-07-31 DIAGNOSIS — N39.0 URINARY TRACT INFECTION WITHOUT HEMATURIA, SITE UNSPECIFIED: ICD-10-CM

## 2019-07-31 LAB
BILIRUB UR QL: NEGATIVE
CLARITY: CLEAR
COLOR UR: ABNORMAL
GLUCOSE 24H UR-MRATE: NEGATIVE G/(24.H)
HGB UR QL STRIP: NEGATIVE
KETONES UR QL STRIP.AUTO: NEGATIVE
LEUKOCYTE ESTERASE: NEGATIVE
NITRITE UR QL STRIP.AUTO: NEGATIVE
PH UR STRIP: 6 [PH] (ref 5–7)
PROT UR STRIP-MCNC: NEGATIVE MG/DL
RBC #/AREA URNS HPF: 0 #/HPF
SP GR UR REFRACTOMETRY: 1.01 (ref 1–1.03)
SQUAMOUS EPITHELIAL CELLS: ABNORMAL
UROBILINOGEN UR QL STRIP.AUTO: NEGATIVE
WBC URNS QL MICRO: 0 #/HPF

## 2019-08-02 LAB — BACTERIA UR CULT: ABNORMAL

## 2019-08-06 RX ORDER — DOXYCYCLINE 100 MG/1
100 TABLET ORAL 2 TIMES DAILY
Qty: 14 TAB | Refills: 0 | Status: SHIPPED | OUTPATIENT
Start: 2019-08-06 | End: 2019-09-09 | Stop reason: ALTCHOICE

## 2019-08-06 NOTE — PROGRESS NOTES
Result of culture discuss with patient. Start Doxycycline 100 mg twice daily x 7 days. Follow up U/a and culture post treatment.

## 2019-08-15 ENCOUNTER — APPOINTMENT (OUTPATIENT)
Dept: INTERNAL MEDICINE CLINIC | Age: 79
End: 2019-08-15

## 2019-08-15 DIAGNOSIS — N39.0 URINARY TRACT INFECTION WITHOUT HEMATURIA, SITE UNSPECIFIED: ICD-10-CM

## 2019-08-15 LAB
BACTERIA,BACTU: ABNORMAL
BILIRUB UR QL: NEGATIVE
CLARITY: CLEAR
COLOR UR: ABNORMAL
GLUCOSE 24H UR-MRATE: NEGATIVE G/(24.H)
HGB UR QL STRIP: NEGATIVE
KETONES UR QL STRIP.AUTO: NEGATIVE
LEUKOCYTE ESTERASE: NEGATIVE
NITRITE UR QL STRIP.AUTO: NEGATIVE
PH UR STRIP: 6 [PH] (ref 5–7)
PROT UR STRIP-MCNC: NEGATIVE MG/DL
RBC #/AREA URNS HPF: 0 #/HPF
SP GR UR REFRACTOMETRY: 1.02 (ref 1–1.03)
SQUAMOUS EPITHELIAL CELLS: ABNORMAL
UROBILINOGEN UR QL STRIP.AUTO: NEGATIVE
WBC URNS QL MICRO: 0 #/HPF

## 2019-08-17 LAB — BACTERIA UR CULT: ABNORMAL

## 2019-08-22 RX ORDER — LEVOTHYROXINE SODIUM 88 UG/1
TABLET ORAL
Qty: 90 TAB | Refills: 3 | Status: SHIPPED | OUTPATIENT
Start: 2019-08-22 | End: 2020-09-14

## 2019-08-22 NOTE — TELEPHONE ENCOUNTER
PCP: Ivet Sinclair NP    Last appt: 8/15/2019  Future Appointments   Date Time Provider Neri Cole   9/9/2019  9:15 AM Alaina Aguilera NP [facility]       Requested Prescriptions     Pending Prescriptions Disp Refills    levothyroxine (SYNTHROID) 88 mcg tablet [Pharmacy Med Name: LEVOTHYROXINE 0.088MG (88MCG) TAB] 90 Tab 3     Sig: TAKE 1 TABLET BY MOUTH DAILY       Prior labs and Blood pressures:  BP Readings from Last 3 Encounters:   06/12/19 134/73   03/27/19 132/80   03/07/19 136/70     Lab Results   Component Value Date/Time    Sodium 139 03/07/2019 10:11 AM    Potassium 4.7 03/07/2019 10:11 AM    Chloride 104 03/07/2019 10:11 AM    CO2 29.0 03/07/2019 10:11 AM    Anion gap 6 03/07/2019 10:11 AM    Glucose 82 03/07/2019 10:11 AM    BUN 17.0 03/07/2019 10:11 AM    Creatinine 0.9 03/07/2019 10:11 AM    BUN/Creatinine ratio 19 03/07/2019 10:11 AM    GFR est AA >60 03/07/2019 10:11 AM    GFR est non-AA >60 03/07/2019 10:11 AM    Calcium 10.4 (H) 03/07/2019 10:11 AM     Lab Results   Component Value Date/Time    Hemoglobin A1c 4.8 03/07/2019 10:09 AM     Lab Results   Component Value Date/Time    Cholesterol, total 158 03/07/2019 10:11 AM    Cholesterol (POC) 175.0 08/23/2018 08:20 AM    HDL Cholesterol 85 03/07/2019 10:11 AM    HDL Cholesterol (POC) 108.0 08/23/2018 08:20 AM    LDL Cholesterol (POC) 46.8 08/23/2018 08:20 AM    LDL, calculated 41 03/07/2019 10:11 AM    VLDL 32 03/07/2019 10:11 AM    Triglyceride 160 03/07/2019 10:11 AM    Triglycerides (POC) 101.0 08/23/2018 08:20 AM    CHOL/HDL Ratio 2 03/07/2019 10:11 AM     Lab Results   Component Value Date/Time    VITAMIN D, 25-HYDROXY 59 03/07/2019 10:11 AM       Lab Results   Component Value Date/Time    TSH, 3rd generation 1.79 03/07/2019 10:11 AM

## 2019-08-28 ENCOUNTER — TELEPHONE (OUTPATIENT)
Dept: INTERNAL MEDICINE CLINIC | Age: 79
End: 2019-08-28

## 2019-08-29 RX ORDER — CIPROFLOXACIN 500 MG/1
500 TABLET ORAL 2 TIMES DAILY
Qty: 14 TAB | Refills: 0 | Status: SHIPPED | OUTPATIENT
Start: 2019-08-29 | End: 2019-09-09 | Stop reason: ALTCHOICE

## 2019-09-09 ENCOUNTER — OFFICE VISIT (OUTPATIENT)
Dept: INTERNAL MEDICINE CLINIC | Age: 79
End: 2019-09-09

## 2019-09-09 VITALS
OXYGEN SATURATION: 96 % | DIASTOLIC BLOOD PRESSURE: 54 MMHG | WEIGHT: 128.2 LBS | TEMPERATURE: 96.9 F | RESPIRATION RATE: 16 BRPM | BODY MASS INDEX: 26.91 KG/M2 | SYSTOLIC BLOOD PRESSURE: 98 MMHG | HEIGHT: 58 IN | HEART RATE: 61 BPM

## 2019-09-09 DIAGNOSIS — R19.7 DIARRHEA, UNSPECIFIED TYPE: ICD-10-CM

## 2019-09-09 DIAGNOSIS — R53.83 FATIGUE, UNSPECIFIED TYPE: ICD-10-CM

## 2019-09-09 DIAGNOSIS — N39.0 URINARY TRACT INFECTION WITHOUT HEMATURIA, SITE UNSPECIFIED: ICD-10-CM

## 2019-09-09 DIAGNOSIS — E78.5 DYSLIPIDEMIA: ICD-10-CM

## 2019-09-09 DIAGNOSIS — G47.00 INSOMNIA, UNSPECIFIED TYPE: Primary | ICD-10-CM

## 2019-09-09 DIAGNOSIS — E03.9 ACQUIRED HYPOTHYROIDISM: ICD-10-CM

## 2019-09-09 DIAGNOSIS — N39.0 RECURRENT UTI: ICD-10-CM

## 2019-09-09 LAB
BACTERIA,BACTU: ABNORMAL
BILIRUB UR QL: NEGATIVE
CLARITY: ABNORMAL
COLOR UR: ABNORMAL
GLUCOSE 24H UR-MRATE: NEGATIVE G/(24.H)
HGB UR QL STRIP: NEGATIVE
KETONES UR QL STRIP.AUTO: NEGATIVE
LEUKOCYTE ESTERASE: ABNORMAL
NITRITE UR QL STRIP.AUTO: NEGATIVE
PH UR STRIP: 6 [PH] (ref 5–7)
PROT UR STRIP-MCNC: ABNORMAL MG/DL
RBC #/AREA URNS HPF: ABNORMAL #/HPF
SP GR UR REFRACTOMETRY: 1.02 (ref 1–1.03)
SQUAMOUS EPITHELIAL CELLS: ABNORMAL
UROBILINOGEN UR QL STRIP.AUTO: NEGATIVE
WBC URNS QL MICRO: ABNORMAL #/HPF

## 2019-09-09 RX ORDER — LORAZEPAM 0.5 MG/1
0.5 TABLET ORAL
Qty: 30 TAB | Refills: 0 | Status: SHIPPED | OUTPATIENT
Start: 2019-09-09 | End: 2021-07-01 | Stop reason: SDUPTHER

## 2019-09-09 RX ORDER — DIPHENOXYLATE HYDROCHLORIDE AND ATROPINE SULFATE 2.5; .025 MG/1; MG/1
1 TABLET ORAL
Qty: 40 TAB | Refills: 0 | Status: SHIPPED | OUTPATIENT
Start: 2019-09-09 | End: 2021-07-01 | Stop reason: SDUPTHER

## 2019-09-09 RX ORDER — CYCLOBENZAPRINE HCL 10 MG
10 TABLET ORAL
Qty: 60 TAB | Refills: 0 | Status: SHIPPED | OUTPATIENT
Start: 2019-09-09

## 2019-09-09 NOTE — PROGRESS NOTES
Davian Goo  Identified pt with two pt identifiers(name and ). Chief Complaint   Patient presents with    Thyroid Problem     6 mo. f/u    GERD    Irritable Bowel Syndrome       1. Have you been to the ER, urgent care clinic since your last visit? Hospitalized since your last visit? No    2. Have you seen or consulted any other health care providers outside of the [address] [place] [address] [place] since your last visit? Include any pap smears or colon screening. Yes, Dr. Rox Horton, [facility], , glaucoma    Medication reconciliation up to date and corrected with patient at this time. Today's provider has been notified of reason for visit, vitals and flowsheets obtained on patients. Reviewed record in preparation for visit, huddled with provider and have obtained necessary documentation. Depression Risk Factor Screening:     3 most recent PHQ Screens 3/7/2019   Little interest or pleasure in doing things Not at all   Feeling down, depressed, irritable, or hopeless Not at all   Total Score PHQ 2 0       Functional Ability and Level of Safety:     Activities of Daily Living  ADL Assessment 3/7/2019   Feeding yourself No Help Needed   Getting from bed to chair No Help Needed   Getting dressed No Help Needed   Bathing or showering No Help Needed   Walk across the room (includes cane/walker) No Help Needed   Using the telphone No Help Needed   Taking your medications No Help Needed   Preparing meals No Help Needed   Managing money (expenses/bills) No Help Needed   Moderately strenuous housework (laundry) No Help Needed   Shopping for personal items (toiletries/medicines) No Help Needed   Shopping for groceries No Help Needed   Driving No Help Needed   Climbing a flight of stairs No Help Needed   Getting to places beyond walking distances No Help Needed       Fall Risk  Fall Risk Assessment, last 12 mths 3/7/2019   Able to walk? Yes   Fall in past 12 months?  No       Abuse Screen  Abuse Screening Questionnaire 3/7/2019   Do you ever feel afraid of your partner? N   Are you in a relationship with someone who physically or mentally threatens you? N   Is it safe for you to go home? Y           Health Maintenance Due   Topic    GLAUCOMA SCREENING Q2Y     Shingrix Vaccine Age 49> (2 of 2)    Influenza Age 5 to Adult     MEDICARE YEARLY EXAM        Wt Readings from Last 3 Encounters:   09/09/19 128 lb 3.2 oz (58.2 kg)   06/12/19 127 lb 1 oz (57.6 kg)   03/26/19 125 lb (56.7 kg)     Temp Readings from Last 3 Encounters:   09/09/19 96.9 °F (36.1 °C) (Oral)   06/12/19 97.6 °F (36.4 °C)   03/26/19 97.9 °F (36.6 °C) (Oral)     BP Readings from Last 3 Encounters:   09/09/19 98/54   06/12/19 134/73   03/27/19 132/80     Pulse Readings from Last 3 Encounters:   09/09/19 61   06/12/19 (!) 59   03/26/19 60     Vitals:    09/09/19 0919   BP: 98/54   Pulse: 61   Resp: 16   Temp: 96.9 °F (36.1 °C)   TempSrc: Oral   SpO2: 96%   Weight: 128 lb 3.2 oz (58.2 kg)   Height: 4' 10\" (1.473 m)   PainSc:   0 - No pain     Requested last eye exam done at [facility].     Patient Care Team   Patient Care Team:  Nini Zarco NP as PCP - General (Nurse Practitioner)  Nini Zarco NP as PCP - INTERNAL MEDICINE (Nurse Practitioner)

## 2019-09-09 NOTE — PATIENT INSTRUCTIONS

## 2019-09-09 NOTE — LETTER
Name:Alayna Gasca BBO:[birth year] MR #:[id number] Provider Name:Rosmery Aguilera NP  
*LCRE-613* BSMG-491 (5/16) Page 1 of 5 Initial [employer] CONTROLLED SUBSTANCE AGREEMENT I may be prescribed medications that are controlled substances as part  of my treatment plan for management of my medical condition(s). The goal of my treatment plan is to maintain and/or improve my health and wellbeing. Because controlled substances have an increased risk of abuse or harm, continual re-evaluation is needed determine if the goals of my treatment plan are being met for my safety and the safety of others. I, Green Colace  am entering into this Controlled Substance Agreement with my provider, Izabel Rogers NP at [address] [place] [address] . I understand that successful treatment requires mutual trust and honesty between me and my provider. I understand that there are state and federal laws and regulations which apply to the medications that my provider may prescribe that must be followed. I understand there are risks and benefits ts of taking the medicines that my provider may prescribe. I understand and agree that following this Agreement is necessary in continuing my provider-patient relationship and success of my treatment plan. As a part of my treatment plan, I agree to the following: COMMUNICATION: 
 
1. I will communicate fully with my provider about my medical condition(s), including the effect on my daily life and how well my medications are helping. I will tell my provider all of the medications that I take for any reason, including medications I receive from another health care provider, and will notify my provider about all issues, problems or concerns, including any side effects, which may be related to my medications.  
 
I understand that this information allows my provider to adjust my treatment plan to help manage my medical condition. I understand that this information will become part of my permanent medical record. 2. I will notify my provider if I have a history of alcohol/drug misuse/addiction or if I have had treatment for alcohol/drug addiction in the past, or if I have a new problem with or concern about alcohol/drug use/addiction, because this increases the likelihood of high risk behaviors and may lead to serious medical conditions. 3. Females Only: I will notify my provider if I am or become pregnant, or if I intend to become pregnant, or if I intend to breastfeed. I understand that communication of these issues with my provider is important, due to possible effects my medication could have on an unborn fetus or breastfeeding child. Name:DharmeshJune B Justo CHURCH:[birth year] MR #:[id number] Provider Name:Wilfredo Aguilera, TAURUS  
*TQMT-200* BSMG-491 (5/16) Page 2 of 5 Initial [employer] MISUSE OF MEDICATIONS / DRUGS: 
 
1. I agree to take all controlled substances as prescribed, and will not misuse or abuse any controlled substances prescribed by my provider. For my safety, I will not increase the amount of medicine I take without first talking with and getting permission from my provider. 2. If I have a medical emergency, another health care provider may prescribe me medication. If I seek emergency treatment, I will notify my provider within seventy-two (72) hours. 3. I understand that my provider may discuss my use and/or possible misuse/abuse of controlled substances and alcohol, as appropriate, with any health care provider involved in my care, pharmacist or legal authority. ILLEGAL DRUGS: 
 
1. I will not use illegal drugs of any kind, including but not limited to marijuana, heroin, cocaine, or any prescription drug which is not prescribed to me.  
 
DRUG DIVERSION / PRESCRIPTION FRAUD: 
 
 1. I will not share, sell, trade, give away, or otherwise misuse my prescriptions or medications. 2. I will not alter any prescriptions provided to me by my provider. SINGLE PROVIDER: 
 
1. I agree that all controlled substances that I take will be prescribed only by my provider (or his/her covering provider) under this Agreement. This agreement does not prevent me from seeking emergency medical treatment or receiving pain management related to a surgery. PROTECTING MEDICATIONS: 
 
1. I am responsible for keeping my prescriptions and medications in a safe and secure place including safeguarding them from loss or theft. I understand that lost, stolen or damaged/destroyed prescriptions or medications will not be replaced. Name:.Nadine Frias KKV:[birth year] MR #:[id number] Provider Name:Shruti Aguilera NP  
*FPFX-556* BSMG-491 (5/16) Page 3 of 5 Initial [employer] PRESCRIPTION RENEWALS/REFILLS: 
 
 
1. I authorize my provider and my pharmacy to cooperate fully with any local, state, or federal law enforcement agency in the investigation of any possible misuse, sale, or other diversion of my controlled substance prescriptions or medications. RISKS: 
 
 
1. I understand that if I do not adhere to this Agreement in any way, my provider may change my prescriptions, stop prescribing controlled substances or end our provider-patient relationship. 2. If my provider decides to stop prescribing medication, or decides to end our provider-patient relationship,my provider may require that I taper my medications slowly. If necessary, my provider may also provide a prescription for other medications to treat my withdrawal symptoms. UNDERSTANDING THIS AGREEMENT: 
 
I understand that my provider may adjust or stop my prescriptions for controlled substances based on my medical condition and my treatment plan. I understand that this Agreement does not guarantee that I will be prescribed medications or controlled substances. I understand that controlled substances may be just one part of my treatment plan. My initial on each page and my signature below shows that I have read each page of this Agreement, I have had an opportunity to ask questions, and all of my questions have been answered to my satisfaction by my provider. By signing below, I agree to comply with this Agreement, and I understand that if I do not follow the Agreements listed above, my provider may stop 
 
 
 
_________________________________________  Date/Time 9/9/2019 9:36 AM   
             (Patient Signature)

## 2019-09-09 NOTE — PROGRESS NOTES
Subjective:  Ms. Robinson Alexander is a pleasant 78year old lady who comes in today for follow up of her medical problems. 1. Hyperlipidemia that has been managed in the past on Atorvastatin 130 mg daily. Today she wants to know if she could stop the medication. She tells me that her cholesterol and triglycerides have been within normal range for several years. She is eating a very healthy diet and is very active and would like to try without the Atorvastatin. 2. Hypothyroidism, managed on Levothyroxine 88 mcg daily. She denies any heat or cold intolerance. She denies any hair or skin changes. 3. Psoriasis and seborrheic dermatitis, under the care of Dr. Tanvi Bill. Review of Systems:  Today she specifically denies any headaches, dizziness or blurred vision. She does get regular eye exams because of her glaucoma. She denies any chest pain or palpitations. She denies SOB, cough, wheezing, PND or orthopnea. Denies any ankle edema. Her appetite has remained good, her weight is stable. She does have a normal bowel pattern without presence of blood in her stools or melena.         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    Depression     Elevated liver function tests 8/3/2017    MIAN (generalized anxiety disorder) 8/3/2017    GERD (gastroesophageal reflux disease)     Glaucoma     Glaucoma 8/3/2017    Headache, acute 8/3/2017    History of colonoscopy with polypectomy 8/3/2017    History of endometrial cancer 8/3/2017    Hyperlipidemia 8/3/2017    Hypothyroid 8/3/2017    IBS (irritable bowel syndrome) 8/3/2017    AMPARO (iron deficiency anemia) 8/3/2017    Insomnia 8/3/2017    Long-term use of immunosuppressant medication 8/3/2017    Medication side effect 8/3/2017    Melena 8/3/2017    Muscle spasm 8/3/2017    Osteoporosis 8/3/2017    Other ill-defined conditions(799.89)     high cholesterol    Other ill-defined conditions(799.89)     psoriasis    Post-menopausal 8/3/2017    Psoriasis 8/3/2017    Pyuria, sterile 8/3/2017    Rash 8/3/2017    Rosacea blepharoconjunctivitis 8/3/2017    Sciatica 8/3/2017    Somatic dysfunction of cervical region 8/3/2017    Spondylosis of cervical region without myelopathy or radiculopathy 8/3/2017    Thrush 8/3/2017    Thyroid disease     Urticaria 8/3/2017     Past Surgical History:   Procedure Laterality Date    COLONOSCOPY N/A 6/12/2019    [facility]  6/12/2019         COLORECTAL SCRN; HI RISK IND  4/2/2014         HX APPENDECTOMY      HX GYN      hysterectomy    HX HEENT      bilateral ear    HX HEENT      LASIK    HX ORTHOPAEDIC  1-2011    lumbar laminectomy    HX OTHER SURGICAL Bilateral     cataract extraction with lens implant    HX TONSILLECTOMY      NEUROLOGICAL PROCEDURE UNLISTED  2011    spinal fusion       Current Outpatient Medications on File Prior to Visit   Medication Sig Dispense Refill    levothyroxine (SYNTHROID) 88 mcg tablet TAKE 1 TABLET BY MOUTH DAILY 90 Tab 3    escitalopram oxalate (LEXAPRO) 20 mg tablet TAKE 1 TABLET BY MOUTH DAILY 90 Tab 3    folic acid (FOLVITE) 1 mg tablet TAKE TWO TABLETS BY MOUTH DAILY 180 Tab 2    atorvastatin (LIPITOR) 40 mg tablet TAKE 1 TABLET BY MOUTH DAILY 90 Tab 3    dorzolamide-timolol (COSOPT) 22.3-6.8 mg/mL ophthalmic solution Cosopt 22.3 mg-6.8 mg/mL eye drops   Prescribed by non [facility] MD      Lactobacillus acidophilus (PROBIOTIC PO) Take  by mouth.       clobetasol (TEMOVATE) 0.05 % ointment two (2) times daily as needed.  omeprazole (PRILOSEC OTC) 20 mg tablet Take 20 mg by mouth daily.  methotrexate (RHEUMATREX) 2.5 mg tablet 2 (TWO) TABLET, ORAL, WEEKLY (Patient taking differently: 2 (TWO) TABLET, ORAL, WEEKLY - pt states she takes this for psoriasis, prescribed by dermatologist Thomas Aguilar, has taken this long-term and has never been instructed to d/c prior to a procedure) 20 Tab 10    dicyclomine (BENTYL) 10 mg capsule TAKE 1 CAPSULE BY MOUTH 4 TIMES A DAY (Patient taking differently: TAKE 1 CAPSULE BY MOUTH 4 TIMES A DAY prn) 120 Cap 3    PREMARIN 0.625 mg tablet TAKE 1 TABLET BY MOUTH EVERY DAY (Patient taking differently: Take 0.3 mg by mouth daily.) 90 Tab 2    naproxen sodium (ALEVE) 220 mg tablet Take 220 mg by mouth two (2) times daily (with meals).  travoprost (TRAVATAN Z) 0.004 % ophthalmic solution Administer 1 Drop to both eyes nightly.  MAGNESIUM PO Take 500 mg by mouth daily.  lysine (L-LYSINE) 500 mg cap Take 500 mg by mouth daily as needed (cold sores).  CETIRIZINE HCL (ZYRTEC PO) Take 10 mg by mouth daily.  estrogens, conjugated, (PREMARIN) 0.45 mg tablet Premarin 0.45 mg tablet   1 Tab PO Daily for 3 months.  minocycline (MINOCIN, DYNACIN) 100 mg capsule Take 100 mg by mouth daily. No current facility-administered medications on file prior to visit. Allergies   Allergen Reactions    Cephalosporins Shortness of Breath    Codeine Nausea and Vomiting    Pcn [Penicillins] Rash     Physical Examination:  GENERAL: Very pleasant lady in no acute distress. She appears much younger than her stated age. VITALS:  BP: 98/54. P: 61.  R: 16.  T: 96.9. O2 sat: 96%. HEENT:  Normocephalic, atraumatic. NECK:  Supple without adenopathy, TM or carotid bruits. CHEST:  Lungs clear to auscultation, no rales or wheezes. CV:  Heart regular rhythm without murmur or gallop. EXTREMITIES:  No edema or calf tenderness. Distal pulses were present.   NEURO:  Exam is non focal.    Impression:  1. Psoriasis and seborrheic dermatitis. 2. Hypothyroidism. 3. Hyperlipidemia. 4. Glaucoma. 5. Allergic rhinitis. 6. Vitamin D deficiency. 7. IBS. Plan:  1. In terms of her hyperlipidemia, it was opted to discontinue her Atorvastatin. We will repeat her lipid profile in six weeks. She is fully agreeable. 2. She will continue with a prudent diet, exercise and weight management to keep BMI within acceptable level. 3. I will continue to see her every six months.

## 2019-09-11 LAB — BACTERIA UR CULT: ABNORMAL

## 2019-09-23 RX ORDER — METHOTREXATE 2.5 MG/1
TABLET ORAL
Qty: 20 TAB | Refills: 10 | Status: CANCELLED | OUTPATIENT
Start: 2019-09-23

## 2019-09-25 PROBLEM — Z00.00 ANNUAL PHYSICAL EXAM: Status: RESOLVED | Noted: 2017-08-03 | Resolved: 2019-09-25

## 2019-09-25 RX ORDER — METHOTREXATE 2.5 MG/1
TABLET ORAL
Qty: 20 TAB | Refills: 10 | Status: SHIPPED | OUTPATIENT
Start: 2019-09-25 | End: 2020-11-13 | Stop reason: SDUPTHER

## 2019-09-25 NOTE — TELEPHONE ENCOUNTER
Requested Prescriptions     Pending Prescriptions Disp Refills    methotrexate (RHEUMATREX) 2.5 mg tablet 20 Tab 10

## 2019-09-25 NOTE — TELEPHONE ENCOUNTER
PCP: Virgilio Saleem NP    Last appt: 2019  Future Appointments   Date Time Provider Neri Kelsey   10/14/2019  8:00 AM LAB ONLY [facility]       Requested Prescriptions     Pending Prescriptions Disp Refills    methotrexate (RHEUMATREX) 2.5 mg tablet 20 Tab 10     Si (TWO) TABLET, ORAL, WEEKLY

## 2019-10-14 ENCOUNTER — APPOINTMENT (OUTPATIENT)
Dept: INTERNAL MEDICINE CLINIC | Age: 79
End: 2019-10-14

## 2019-10-14 ENCOUNTER — OFFICE VISIT (OUTPATIENT)
Dept: INTERNAL MEDICINE CLINIC | Age: 79
End: 2019-10-14

## 2019-10-14 VITALS
WEIGHT: 129 LBS | SYSTOLIC BLOOD PRESSURE: 106 MMHG | TEMPERATURE: 97.5 F | HEIGHT: 58 IN | OXYGEN SATURATION: 98 % | RESPIRATION RATE: 16 BRPM | HEART RATE: 66 BPM | DIASTOLIC BLOOD PRESSURE: 63 MMHG | BODY MASS INDEX: 27.08 KG/M2

## 2019-10-14 DIAGNOSIS — R10.13 EPIGASTRIC DISCOMFORT: ICD-10-CM

## 2019-10-14 DIAGNOSIS — E78.5 DYSLIPIDEMIA: ICD-10-CM

## 2019-10-14 DIAGNOSIS — Z23 ENCOUNTER FOR IMMUNIZATION: Primary | ICD-10-CM

## 2019-10-14 DIAGNOSIS — R53.83 FATIGUE, UNSPECIFIED TYPE: ICD-10-CM

## 2019-10-14 LAB
ANION GAP SERPL CALC-SCNC: 3 MMOL/L
BUN SERPL-MCNC: 20 MG/DL (ref 7–17)
CALCIUM SERPL-MCNC: 9.5 MG/DL (ref 8.4–10.2)
CHLORIDE SERPL-SCNC: 107 MMOL/L (ref 98–107)
CHOL/HDL RATIO,CHHD: 2 RATIO (ref 0–4)
CHOLEST SERPL-MCNC: 200 MG/DL (ref 0–200)
CO2 SERPL-SCNC: 28 MMOL/L (ref 22–32)
CREAT SERPL-MCNC: 0.9 MG/DL (ref 0.7–1.2)
GLUCOSE SERPL-MCNC: 87 MG/DL (ref 65–105)
HDLC SERPL-MCNC: 93 MG/DL (ref 35–130)
LDL/HDL RATIO,LDHD: 1 RATIO
LDLC SERPL CALC-MCNC: 86 MG/DL (ref 0–130)
POTASSIUM SERPL-SCNC: 4.8 MMOL/L (ref 3.6–5)
SODIUM SERPL-SCNC: 138 MMOL/L (ref 137–145)
TRIGL SERPL-MCNC: 105 MG/DL (ref 0–200)
VLDLC SERPL CALC-MCNC: 21 MG/DL

## 2019-10-14 RX ORDER — PANTOPRAZOLE SODIUM 40 MG/1
TABLET, DELAYED RELEASE ORAL
Qty: 40 TAB | Refills: 0 | Status: SHIPPED | OUTPATIENT
Start: 2019-10-14 | End: 2019-10-31 | Stop reason: SDUPTHER

## 2019-10-14 NOTE — PATIENT INSTRUCTIONS
Vaccine Information Statement    Influenza (Flu) Vaccine (Inactivated or Recombinant): What You Need to Know    Many Vaccine Information Statements are available in Uzbek and other languages. See www.immunize.org/vis  Hojas de información sobre vacunas están disponibles en español y en muchos otros idiomas. Visite www.immunize.org/vis    1. Why get vaccinated? Influenza vaccine can prevent influenza (flu). Flu is a contagious disease that spreads around the [facility] every year, usually between October and May. Anyone can get the flu, but it is more dangerous for some people. Infants and young children, people 72years of age and older, pregnant women, and people with certain health conditions or a weakened immune system are at greatest risk of flu complications. Pneumonia, bronchitis, sinus infections and ear infections are examples of flu-related complications. If you have a medical condition, such as heart disease, cancer or diabetes, flu can make it worse. Flu can cause fever and chills, sore throat, muscle aches, fatigue, cough, headache, and runny or stuffy nose. Some people may have vomiting and diarrhea, though this is more common in children than adults. Each year thousands of people in the [facility] die from flu, and many more are hospitalized. Flu vaccine prevents millions of illnesses and flu-related visits to the doctor each year. 2. Influenza vaccines     CDC recommends everyone 10months of age and older get vaccinated every flu season. Children 6 months through 6years of age may need 2 doses during a single flu season. Everyone else needs only 1 dose each flu season. It takes about 2 weeks for protection to develop after vaccination. There are many flu viruses, and they are always changing. Each year a new flu vaccine is made to protect against three or four viruses that are likely to cause disease in the upcoming flu season.  Even when the vaccine doesnt exactly match these viruses, it may still provide some protection. Influenza vaccine does not cause flu. Influenza vaccine may be given at the same time as other vaccines. 3. Talk with your health care provider    Tell your vaccine provider if the person getting the vaccine:   Has had an allergic reaction after a previous dose of influenza vaccine, or has any severe, life-threatening allergies.  Has ever had Guillain-Barré Syndrome (also called GBS). In some cases, your health care provider may decide to postpone influenza vaccination to a future visit. People with minor illnesses, such as a cold, may be vaccinated. People who are moderately or severely ill should usually wait until they recover before getting influenza vaccine. Your health care provider can give you more information. 4. Risks of a reaction     Soreness, redness, and swelling where shot is given, fever, muscle aches, and headache can happen after influenza vaccine.  There may be a very small increased risk of Guillain-Barré Syndrome (GBS) after inactivated influenza vaccine (the flu shot). Barbara [place]on children who get the flu shot along with pneumococcal vaccine (PCV13), and/or DTaP vaccine at the same time might be slightly more likely to have a seizure caused by fever. Tell your health care provider if a child who is getting flu vaccine has ever had a seizure. People sometimes faint after medical procedures, including vaccination. Tell your provider if you feel dizzy or have vision changes or ringing in the ears. As with any medicine, there is a very remote chance of a vaccine causing a severe allergic reaction, other serious injury, or death. 5. What if there is a serious problem? An allergic reaction could occur after the vaccinated person leaves the clinic.  If you see signs of a severe allergic reaction (hives, swelling of the face and throat, difficulty breathing, a fast heartbeat, dizziness, or weakness), call 9-1-1 and get the person to the nearest hospital.    For other signs that concern you, call your health care provider. Adverse reactions should be reported to the Vaccine Adverse Event Reporting System (VAERS). Your health care provider will usually file this report, or you can do it yourself. Visit the VAERS website at www.vaers. [facility].gov or call 1-279.631.3286. VAERS is only for reporting reactions, and VAERS staff do not give medical advice. 6. The National Vaccine Injury Compensation Program    The [facility] Vaccine Injury Compensation Program (VICP) is a federal program that was created to compensate people who may have been injured by certain vaccines. Visit the VICP website at www.[facility].gov/vaccinecompensation or call 2-590.412.3482 to learn about the program and about filing a claim. There is a time limit to file a claim for compensation. 7. How can I learn more?  Ask your health care provider.  Call your local or state health department.  Contact the Centers for Disease Control and Prevention (CDC):  - Call 7-391.417.2454 (1-800-CDC-INFO) or  - Visit CDCs influenza website at www.cdc.gov/flu    Vaccine Information Statement (Interim)  Inactivated Influenza Vaccine   8/15/2019  42 MENDOZA Jose Maria Chan [id number]   Department of Health and Human Services  Centers for Disease Control and Prevention    Office Use Only         Influenza (Flu) Vaccine (Inactivated or Recombinant): What You Need to Know  Why get vaccinated? Influenza (\"flu\") is a contagious disease that spreads around the [place] every winter, usually between October and May. Flu is caused by influenza viruses and is spread mainly by coughing, sneezing, and close contact. Anyone can get flu. Flu strikes suddenly and can last several days. Symptoms vary by age, but can include:  · Fever/chills. · Sore throat. · Muscle aches. · Fatigue. · Cough. · Headache. · Runny or stuffy nose.   Flu can also lead to pneumonia and blood infections, and cause diarrhea and seizures in children. If you have a medical condition, such as heart or lung disease, flu can make it worse. Flu is more dangerous for some people. Infants and young children, people 72years of age and older, pregnant women, and people with certain health conditions or a weakened immune system are at greatest risk. Each year thousands of people in the [facility] die from flu, and many more are hospitalized. Flu vaccine can:  · Keep you from getting flu. · Make flu less severe if you do get it. · Keep you from spreading flu to your family and other people. Inactivated and recombinant flu vaccines  A dose of flu vaccine is recommended every flu season. Children 6 months through 6years of age may need two doses during the same flu season. Everyone else needs only one dose each flu season. Some inactivated flu vaccines contain a very small amount of a mercury-based preservative called thimerosal. Studies have not shown thimerosal in vaccines to be harmful, but flu vaccines that do not contain thimerosal are available. There is no live flu virus in flu shots. They cannot cause the flu. There are many flu viruses, and they are always changing. Each year a new flu vaccine is made to protect against three or four viruses that are likely to cause disease in the upcoming flu season. But even when the vaccine doesn't exactly match these viruses, it may still provide some protection. Flu vaccine cannot prevent:  · Flu that is caused by a virus not covered by the vaccine. · Illnesses that look like flu but are not. Some people should not get this vaccine  Tell the person who is giving you the vaccine:  · If you have any severe (life-threatening) allergies. If you ever had a life-threatening allergic reaction after a dose of flu vaccine, or have a severe allergy to any part of this vaccine, you may be advised not to get vaccinated.  Most, but not all, types of flu vaccine contain a small amount of egg protein. · If you ever had Guillain-Barré syndrome (also called GBS) Some people with a history of GBS should not get this vaccine. This should be discussed with your doctor. · If you are not feeling well. It is usually okay to get flu vaccine when you have a mild illness, but you might be asked to come back when you feel better. Risks of a vaccine reaction  With any medicine, including vaccines, there is a chance of reactions. These are usually mild and go away on their own, but serious reactions are also possible. Most people who get a flu shot do not have any problems with it. Minor problems following a flu shot include:  · Soreness, redness, or swelling where the shot was given  · Hoarseness  · Sore, red or itchy eyes  · Cough  · Fever  · Aches  · Headache  · Itching  · Fatigue  If these problems occur, they usually begin soon after the shot and last 1 or 2 days. More serious problems following a flu shot can include the following:  · There may be a small increased risk of Guillain-Barré Syndrome (GBS) after inactivated flu vaccine. This risk has been estimated at 1 or 2 additional cases per million people vaccinated. This is much lower than the risk of severe complications from flu, which can be prevented by flu vaccine. · [facility] children who get the flu shot along with pneumococcal vaccine (PCV13) and/or DTaP vaccine at the same time might be slightly more likely to have a seizure caused by fever. Ask your doctor for more information. Tell your doctor if a child who is getting flu vaccine has ever had a seizure  Problems that could happen after any injected vaccine:  · People sometimes faint after a medical procedure, including vaccination. Sitting or lying down for about 15 minutes can help prevent fainting, and injuries caused by a fall. Tell your doctor if you feel dizzy, or have vision changes or ringing in the ears.   · Some people get severe pain in the shoulder and have difficulty moving the arm where a shot was given. This happens very rarely. · Any medication can cause a severe allergic reaction. Such reactions from a vaccine are very rare, estimated at about 1 in a million doses, and would happen within a few minutes to a few hours after the vaccination. As with any medicine, there is a very remote chance of a vaccine causing a serious injury or death. The safety of vaccines is always being monitored. For more information, visit: www.cdc.gov/vaccinesafety/. What if there is a serious reaction? What should I look for? · Look for anything that concerns you, such as signs of a severe allergic reaction, very high fever, or unusual behavior. Signs of a severe allergic reaction can include hives, swelling of the face and throat, difficulty breathing, a fast heartbeat, dizziness, and weakness - usually within a few minutes to a few hours after the vaccination. What should I do? · If you think it is a severe allergic reaction or other emergency that can't wait, call 9-1-1 and get the person to the nearest hospital. Otherwise, call your doctor. · Reactions should be reported to the \"Vaccine Adverse Event Reporting System\" (VAERS). Your doctor should file this report, or you can do it yourself through the VAERS website at www.vaers. [facility].gov, or by calling 3-649.254.5304. VAERS does not give medical advice. The National Vaccine Injury Compensation Program  The National Vaccine Injury Compensation Program (VICP) is a federal program that was created to compensate people who may have been injured by certain vaccines. Persons who believe they may have been injured by a vaccine can learn about the program and about filing a claim by calling 1-605.701.6394 or visiting the [employer] website at www.[facility].gov/vaccinecompensation. There is a time limit to file a claim for compensation. How can I learn more? · Ask your healthcare provider.  He or she can give you the vaccine package insert or suggest other sources of information. · Call your local or state health department. · Contact the Centers for Disease Control and Prevention (CDC):  ? Call 9-197.961.2816 (1-800-CDC-INFO) or  ? Visit CDC's website at www.cdc.gov/flu  Vaccine Information Statement  Inactivated Influenza Vaccine  8/7/2015)  42 MENDOZA Landin [id number]  Department of Health and Human Services  Centers for Disease Control and Prevention  Many Vaccine Information Statements are available in [place]ian and other languages. See www.immunize.org/vis. Muchas hojas de información sobre vacunas están disponibles en español y en otros idiomas. Visite www.immunize.org/vis. Care instructions adapted under license by [employer] (which disclaims liability or warranty for this information). If you have questions about a medical condition or this instruction, always ask your healthcare professional. [address] any warranty or liability for your use of this information.

## 2019-10-14 NOTE — PROGRESS NOTES
Room 114    1. Have you been to the ER, urgent care clinic since your last visit? Hospitalized since your last visit? No     2. Have you seen or consulted any other health care providers outside of the [address] [place] [address] since your last visit? Include any pap smears or colon screening. No    Chief Complaint   Patient presents with    Medication Management    GI Problem     follow up from colonoscopy.      Visit Vitals  /63 (BP 1 Location: Left arm, BP Patient Position: Sitting)   Pulse 66   Temp 97.5 °F (36.4 °C) (Oral)   Resp 16   Ht 4' 10\" (1.473 m)   Wt 129 lb (58.5 kg)   SpO2 98%   BMI 26.96 kg/m²

## 2019-10-14 NOTE — PROGRESS NOTES
Subjective:  Ms. Larry Holter is a pleasant, 78-year-old lady, who comes in today with several week history of epigastric discomfort. Despite the fact that she has been watching her diet, has used her Prilosec faithfully, as well as Dicyclomine, she continues to note some epigastric discomfort. It is intermittent. At times she has had mild nausea, but no vomiting. She has had some reflux. Her weight remains stable. She did get a colonoscopy back in June of 2019, at which time Dr. Scott Ferrer did not feel that she needed any repeat screening because of her age. Stools remain normal.  Denies presence of blood in stools or melena.     Past Medical History:   Diagnosis Date    Annual physical exam 8/3/2017    Anxiety 8/3/2017    Arthralgia 8/3/2017    Arthritis     Arthritis, hip 8/3/2017    Autoimmune disease ([facility]     psoriasis    Avascular necrosis of femoral head ([facility] left 8/3/2017    Bone loss 8/3/2017    Cancer ([facility]     endometrial    Contusion of left knee, initial encounter 8/3/2017    Degenerative arthritis of lumbar spine 8/3/2017    Depression     Elevated liver function tests 8/3/2017    MIAN (generalized anxiety disorder) 8/3/2017    GERD (gastroesophageal reflux disease)     Glaucoma     Glaucoma 8/3/2017    Headache, acute 8/3/2017    History of colonoscopy with polypectomy 8/3/2017    History of endometrial cancer 8/3/2017    Hyperlipidemia 8/3/2017    Hypothyroid 8/3/2017    IBS (irritable bowel syndrome) 8/3/2017    AMPARO (iron deficiency anemia) 8/3/2017    Insomnia 8/3/2017    Long-term use of immunosuppressant medication 8/3/2017    Medication side effect 8/3/2017    Melena 8/3/2017    Muscle spasm 8/3/2017    Osteoporosis 8/3/2017    Other ill-defined conditions(799.89)     high cholesterol    Other ill-defined conditions(799.89)     psoriasis    Post-menopausal 8/3/2017    Psoriasis 8/3/2017    Pyuria, sterile 8/3/2017    Rash 8/3/2017    Rosacea blepharoconjunctivitis 8/3/2017    Sciatica 8/3/2017    Somatic dysfunction of cervical region 8/3/2017    Spondylosis of cervical region without myelopathy or radiculopathy 8/3/2017    Thrush 8/3/2017    Thyroid disease     Urticaria 8/3/2017     Past Surgical History:   Procedure Laterality Date    COLONOSCOPY N/A 6/12/2019    COLONOSCOPY performed by Fatimah Blackman MD at [address]  6/12/2019         COLORECTAL SCRN; HI RISK IND  4/2/2014         HX APPENDECTOMY      HX GYN      hysterectomy    HX HEENT      bilateral ear    HX HEENT      LASIK    HX ORTHOPAEDIC  1-2011    lumbar laminectomy    HX OTHER SURGICAL Bilateral     cataract extraction with lens implant    HX TONSILLECTOMY      NEUROLOGICAL PROCEDURE UNLISTED  2011    spinal fusion       Current Outpatient Medications on File Prior to Visit   Medication Sig Dispense Refill    loratadine (CLARITIN PO) Take  by mouth.  methotrexate (RHEUMATREX) 2.5 mg tablet 2 (TWO) TABLET, ORAL, WEEKLY 20 Tab 10    diphenoxylate-atropine (LOMOTIL) 2.5-0.025 mg per tablet Take 1 Tab by mouth four (4) times daily as needed for Diarrhea. Max Daily Amount: 4 Tabs. 40 Tab 0    cyclobenzaprine (FLEXERIL) 10 mg tablet Take 1 Tab by mouth three (3) times daily as needed for Muscle Spasm(s) (neck pain). 60 Tab 0    levothyroxine (SYNTHROID) 88 mcg tablet TAKE 1 TABLET BY MOUTH DAILY 90 Tab 3    escitalopram oxalate (LEXAPRO) 20 mg tablet TAKE 1 TABLET BY MOUTH DAILY 90 Tab 3    folic acid (FOLVITE) 1 mg tablet TAKE TWO TABLETS BY MOUTH DAILY 180 Tab 2    estrogens, conjugated, (PREMARIN) 0.45 mg tablet Premarin 0.45 mg tablet   1 Tab PO Daily for 3 months.  dorzolamide-timolol (COSOPT) 22.3-6.8 mg/mL ophthalmic solution Cosopt 22.3 mg-6.8 mg/mL eye drops   Prescribed by non 606/706 Marion Del Rosario MD      Lactobacillus acidophilus (PROBIOTIC PO) Take  by mouth.       clobetasol (TEMOVATE) 0.05 % ointment two (2) times daily as needed.  dicyclomine (BENTYL) 10 mg capsule TAKE 1 CAPSULE BY MOUTH 4 TIMES A DAY (Patient taking differently: TAKE 1 CAPSULE BY MOUTH 4 TIMES A DAY prn) 120 Cap 3    PREMARIN 0.625 mg tablet TAKE 1 TABLET BY MOUTH EVERY DAY (Patient taking differently: Take 0.3 mg by mouth daily.) 90 Tab 2    naproxen sodium (ALEVE) 220 mg tablet Take 220 mg by mouth two (2) times daily (with meals).  travoprost (TRAVATAN Z) 0.004 % ophthalmic solution Administer 1 Drop to both eyes nightly.  MAGNESIUM PO Take 500 mg by mouth daily.  lysine (L-LYSINE) 500 mg cap Take 500 mg by mouth daily as needed (cold sores).  LORazepam (ATIVAN) 0.5 mg tablet Take 1 Tab by mouth nightly as needed (sleep). Max Daily Amount: 0.5 mg. 30 Tab 0    atorvastatin (LIPITOR) 40 mg tablet TAKE 1 TABLET BY MOUTH DAILY 90 Tab 3    [DISCONTINUED] omeprazole (PRILOSEC OTC) 20 mg tablet Take 20 mg by mouth daily.  minocycline (MINOCIN, DYNACIN) 100 mg capsule Take 100 mg by mouth daily.  CETIRIZINE HCL (ZYRTEC PO) Take 10 mg by mouth daily. No current facility-administered medications on file prior to visit. Allergies   Allergen Reactions    Cephalosporins Shortness of Breath    Codeine Nausea and Vomiting    Pcn [Penicillins] Rash     Physical Examination:  GENERAL:  Pleasant lady in no acute distress. She is alert and oriented. VITALS:  Blood pressure:  106/63. Pulse:  66.  Respirations:  16.  Temperature:  97.5. O2 sat: 98.  Weight:  Stable at 129. HEENT:  Normocephalic, atraumatic. NECK:  Supple without adenopathy. CHEST:  Lungs clear to auscultation, no rales or wheezes. CV:  Heart regular rhythm. ABDOMEN:  Bowel sounds present in four quadrants. She does have some mild tenderness in the epigastric area. Certainly no rebound or guarding. No palpable organomegaly or masses. No CVA tenderness. EXTREMITIES:  No edema or calf tenderness.   Distal pulses were present. Impression:  1. Epigastric discomfort. Plan:  1. Further discussion revealed that she has been using a lot of Advil for her back pain. I have asked her to discontinue her Advil and I am going to have her discontinue Prilosec and start her on Protonix 40 mg daily for the next ten days, then she may resume one tablet daily. 2. She has already set herself up to see Dr. Alan Morejon nurse practitioner in three weeks. Certainly should her symptoms fail to improve or worsen over that period of time, she will contact us.

## 2019-10-15 LAB
BASOPHILS # BLD AUTO: 0 X10E3/UL (ref 0–0.2)
BASOPHILS NFR BLD AUTO: 1 %
EOSINOPHIL # BLD AUTO: 0.2 X10E3/UL (ref 0–0.4)
EOSINOPHIL NFR BLD AUTO: 6 %
ERYTHROCYTE [DISTWIDTH] IN BLOOD BY AUTOMATED COUNT: 13.7 % (ref 12.3–15.4)
HCT VFR BLD AUTO: 37.8 % (ref 34–46.6)
HGB BLD-MCNC: 13.1 G/DL (ref 11.1–15.9)
IMM GRANULOCYTES # BLD AUTO: 0 X10E3/UL (ref 0–0.1)
IMM GRANULOCYTES NFR BLD AUTO: 0 %
LYMPHOCYTES # BLD AUTO: 1.2 X10E3/UL (ref 0.7–3.1)
LYMPHOCYTES NFR BLD AUTO: 29 %
MCH RBC QN AUTO: 31.1 PG (ref 26.6–33)
MCHC RBC AUTO-ENTMCNC: 34.7 G/DL (ref 31.5–35.7)
MCV RBC AUTO: 90 FL (ref 79–97)
MONOCYTES # BLD AUTO: 0.4 X10E3/UL (ref 0.1–0.9)
MONOCYTES NFR BLD AUTO: 11 %
NEUTROPHILS # BLD AUTO: 2.1 X10E3/UL (ref 1.4–7)
NEUTROPHILS NFR BLD AUTO: 53 %
PLATELET # BLD AUTO: 215 X10E3/UL (ref 150–450)
RBC # BLD AUTO: 4.21 X10E6/UL (ref 3.77–5.28)
WBC # BLD AUTO: 4 X10E3/UL (ref 3.4–10.8)

## 2019-11-01 RX ORDER — PANTOPRAZOLE SODIUM 40 MG/1
TABLET, DELAYED RELEASE ORAL
Qty: 40 TAB | Refills: 0 | Status: SHIPPED | OUTPATIENT
Start: 2019-11-01 | End: 2019-12-04

## 2019-11-01 NOTE — TELEPHONE ENCOUNTER
PCP: Loly Chavez NP    Last appt: 10/14/2019  No future appointments.     Requested Prescriptions     Pending Prescriptions Disp Refills    pantoprazole (PROTONIX) 40 mg tablet [Pharmacy Med Name: PANTOPRAZOLE 40MG TABLETS] 40 Tab 0     Sig: TAKE 1 TABLET BY MOUTH TWICE DAILY FOR 10 DAYS THEN TAKE 1 TABLET BY MOUTH DAILY

## 2019-11-09 ENCOUNTER — APPOINTMENT (OUTPATIENT)
Dept: GENERAL RADIOLOGY | Age: 79
End: 2019-11-09
Attending: NURSE PRACTITIONER
Payer: MEDICARE

## 2019-11-09 ENCOUNTER — HOSPITAL ENCOUNTER (EMERGENCY)
Age: 79
Discharge: HOME | End: 2019-11-09
Attending: EMERGENCY MEDICINE
Payer: MEDICARE

## 2019-11-09 ENCOUNTER — APPOINTMENT (OUTPATIENT)
Dept: GENERAL RADIOLOGY | Age: 79
End: 2019-11-09
Payer: MEDICARE

## 2019-11-09 VITALS
TEMPERATURE: 97.2 F | OXYGEN SATURATION: 98 % | BODY MASS INDEX: 25.33 KG/M2 | RESPIRATION RATE: 16 BRPM | HEART RATE: 67 BPM | WEIGHT: 125.66 LBS | SYSTOLIC BLOOD PRESSURE: 123 MMHG | HEIGHT: 59 IN | DIASTOLIC BLOOD PRESSURE: 69 MMHG

## 2019-11-09 DIAGNOSIS — S52.501A CLOSED FRACTURE DISTAL RADIUS AND ULNA, RIGHT, INITIAL ENCOUNTER: Primary | ICD-10-CM

## 2019-11-09 DIAGNOSIS — S52.601A CLOSED FRACTURE DISTAL RADIUS AND ULNA, RIGHT, INITIAL ENCOUNTER: Primary | ICD-10-CM
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
## 2019-11-09 PROCEDURE — 74011250637 HC RX REV CODE- 250/637: Performed by: PHYSICIAN ASSISTANT

## 2019-11-09 PROCEDURE — 73110 X-RAY EXAM OF WRIST: CPT

## 2019-11-09 PROCEDURE — 73100 X-RAY EXAM OF WRIST: CPT

## 2019-11-09 PROCEDURE — 75810000303 HC CLSD TRMT  FRACTURE/DISLOCATION W/  ANES

## 2019-11-09 PROCEDURE — 99284 EMERGENCY DEPT VISIT MOD MDM: CPT

## 2019-11-09 PROCEDURE — 74011000250 HC RX REV CODE- 250: Performed by: PHYSICIAN ASSISTANT

## 2019-11-09 RX ORDER — OXYCODONE HYDROCHLORIDE 5 MG/1
5 TABLET ORAL
Qty: 12 TAB | Refills: 0 | Status: SHIPPED | OUTPATIENT
Start: 2019-11-09 | End: 2019-11-14

## 2019-11-09 RX ORDER — ONDANSETRON 4 MG/1
4 TABLET, ORALLY DISINTEGRATING ORAL
Qty: 10 TAB | Refills: 0 | Status: SHIPPED | OUTPATIENT
Start: 2019-11-09 | End: 2019-11-09

## 2019-11-09 RX ORDER — LIDOCAINE HYDROCHLORIDE 20 MG/ML
10 INJECTION, SOLUTION EPIDURAL; INFILTRATION; INTRACAUDAL; PERINEURAL
Status: COMPLETED | OUTPATIENT
Start: 2019-11-09 | End: 2019-11-09

## 2019-11-09 RX ORDER — SODIUM CHLORIDE 0.9 % (FLUSH) 0.9 %
5-40 SYRINGE (ML) INJECTION AS NEEDED
Status: DISCONTINUED | OUTPATIENT
Start: 2019-11-09 | End: 2019-11-09 | Stop reason: HOSPADM

## 2019-11-09 RX ORDER — OXYCODONE HYDROCHLORIDE 5 MG/1
5 TABLET ORAL
Qty: 12 TAB | Refills: 0 | Status: SHIPPED | OUTPATIENT
Start: 2019-11-09 | End: 2019-11-09

## 2019-11-09 RX ORDER — SODIUM CHLORIDE 0.9 % (FLUSH) 0.9 %
5-40 SYRINGE (ML) INJECTION EVERY 8 HOURS
Status: DISCONTINUED | OUTPATIENT
Start: 2019-11-09 | End: 2019-11-09 | Stop reason: HOSPADM

## 2019-11-09 RX ORDER — ONDANSETRON 4 MG/1
4 TABLET, ORALLY DISINTEGRATING ORAL
Status: COMPLETED | OUTPATIENT
Start: 2019-11-09 | End: 2019-11-09

## 2019-11-09 RX ORDER — OXYCODONE AND ACETAMINOPHEN 5; 325 MG/1; MG/1
1 TABLET ORAL
Status: COMPLETED | OUTPATIENT
Start: 2019-11-09 | End: 2019-11-09

## 2019-11-09 RX ORDER — ONDANSETRON 4 MG/1
4 TABLET, ORALLY DISINTEGRATING ORAL
Qty: 10 TAB | Refills: 0 | Status: SHIPPED | OUTPATIENT
Start: 2019-11-09 | End: 2019-11-19

## 2019-11-09 RX ADMIN — LIDOCAINE HYDROCHLORIDE 200 MG: 20 INJECTION, SOLUTION EPIDURAL; INFILTRATION; INTRACAUDAL; PERINEURAL at 15:16

## 2019-11-09 RX ADMIN — OXYCODONE HYDROCHLORIDE AND ACETAMINOPHEN 1 TABLET: 5; 325 TABLET ORAL at 14:14

## 2019-11-09 RX ADMIN — ONDANSETRON 4 MG: 4 TABLET, ORALLY DISINTEGRATING ORAL at 14:14

## 2019-11-09 NOTE — ED NOTES
Pt presents from triage with cc of right wrist pain after tripping and falling off of a step stool on R wrist PTA. She denies dizziness, hitting her head, or LOC. Neurovascularly intact distal to R wrist. Pt in position of comfort with no signs of acute distress.

## 2019-11-09 NOTE — CONSULTS
ORTHOPAEDIC CONSULT NOTE    Subjective:     Date of Consultation:  November 9, 2019      Nadine Servin is a 78 y.o. female who is being seen for R wrist pain s/p GLF thsi afternoon. Work up has reveled a R distal radius/ulna fracture. Pt is R hand dominant. Pt's family at bedside during exam. Plan of care discussed with pt and family, all questions/concerns addressed and pt and family verbalized understanding of plan of care.      Patient Active Problem List    Diagnosis Date Noted    Acute recurrent pansinusitis 02/23/2018    Epigastric pain 11/27/2017    GERD (gastroesophageal reflux disease) 11/27/2017    Herpes zoster without complication 26/25/4871    Labral tear of hip, degenerative 10/24/2017    Chronic eczematous otitis externa of both ears 08/24/2017    Dyslipidemia 08/24/2017    Monilial vaginitis 08/24/2017    History of endometrial cancer 08/03/2017    History of colonoscopy with polypectomy 08/03/2017    Thrush 08/03/2017    Spondylosis of cervical region without myelopathy or radiculopathy 08/03/2017    Arthritis of right hip 08/03/2017    Rash 08/03/2017    Osteoporosis 08/03/2017    Hypothyroid 08/03/2017    Hyperlipidemia 08/03/2017    Post-menopausal 08/03/2017    Contusion of left knee, initial encounter 08/03/2017    Degenerative arthritis of lumbar spine 08/03/2017    IBS (irritable bowel syndrome) 08/03/2017    Long-term use of immunosuppressant medication 08/03/2017    Anxiety 08/03/2017    Muscle spasm 08/03/2017    Avascular necrosis of femoral head ([address] left 08/03/2017    Pyuria, sterile 08/03/2017    Somatic dysfunction of cervical region 08/03/2017    Elevated liver function tests 08/03/2017    AMPARO (iron deficiency anemia) 08/03/2017    MIAN (generalized anxiety disorder) 08/03/2017    Medication side effect 08/03/2017    Arthralgia 08/03/2017    Rosacea blepharoconjunctivitis 08/03/2017    Insomnia 08/03/2017    Melena 08/03/2017    Headache, acute 2017    Psoriasis 2017    Sciatica 2017    Urticaria 2017    Glaucoma 2017    Bone loss 2017     Family History   Problem Relation Age of Onset    Heart Disease Mother     Other Mother         kidney stones    Diabetes Father     Cancer Sister         colon cancer    Heart Disease Brother     Diabetes Brother     Other Brother         kidney stones    Colon Cancer Brother     Heart Disease Brother     Other Brother         kidney stones    Heart Disease Brother     Heart Disease Brother     Cancer Brother         melanoma      Social History     Tobacco Use    Smoking status: Former Smoker     Last attempt to quit: 1979     Years since quittin.8    Smokeless tobacco: Never Used   Substance Use Topics    Alcohol use:  Yes     Alcohol/week: 7.0 standard drinks     Types: 7 Glasses of wine per week     Past Medical History:   Diagnosis Date    Annual physical exam 8/3/2017    Anxiety 8/3/2017    Arthralgia 8/3/2017    Arthritis     Arthritis, hip 8/3/2017    Autoimmune disease ([facility] [address]     psoriasis    Avascular necrosis of femoral head ([facility] [address] left 8/3/2017    Bone loss 8/3/2017    Cancer ([facility] [address]     endometrial    Contusion of left knee, initial encounter 8/3/2017    Degenerative arthritis of lumbar spine 8/3/2017    Depression     Elevated liver function tests 8/3/2017    MIAN (generalized anxiety disorder) 8/3/2017    GERD (gastroesophageal reflux disease)     Glaucoma     Glaucoma 8/3/2017    Headache, acute 8/3/2017    History of colonoscopy with polypectomy 8/3/2017    History of endometrial cancer 8/3/2017    Hyperlipidemia 8/3/2017    Hypothyroid 8/3/2017    IBS (irritable bowel syndrome) 8/3/2017    AMPARO (iron deficiency anemia) 8/3/2017    Insomnia 8/3/2017    Long-term use of immunosuppressant medication 8/3/2017    Medication side effect 8/3/2017    Melena 8/3/2017    Muscle spasm 8/3/2017    Osteoporosis 8/3/2017  Other ill-defined conditions(799.89)     high cholesterol    Other ill-defined conditions(799.89)     psoriasis    Post-menopausal 8/3/2017    Psoriasis 8/3/2017    Pyuria, sterile 8/3/2017    Rash 8/3/2017    Rosacea blepharoconjunctivitis 8/3/2017    Sciatica 8/3/2017    Somatic dysfunction of cervical region 8/3/2017    Spondylosis of cervical region without myelopathy or radiculopathy 8/3/2017    Thrush 8/3/2017    Thyroid disease     Urticaria 8/3/2017      Past Surgical History:   Procedure Laterality Date    COLONOSCOPY N/A 6/12/2019    [facility]  6/12/2019         COLORECTAL SCRN; HI RISK IND  4/2/2014         HX APPENDECTOMY      HX GYN      hysterectomy    HX HEENT      bilateral ear    HX HEENT      LASIK    HX ORTHOPAEDIC  1-2011    lumbar laminectomy    HX OTHER SURGICAL Bilateral     cataract extraction with lens implant    HX TONSILLECTOMY      NEUROLOGICAL PROCEDURE UNLISTED  2011    spinal fusion      Prior to Admission medications    Medication Sig Start Date End Date Taking? Authorizing Provider   pantoprazole (PROTONIX) 40 mg tablet TAKE 1 TABLET BY MOUTH TWICE DAILY FOR 10 DAYS THEN TAKE 1 TABLET BY MOUTH DAILY 11/1/19   Bryon Dietrich MD   loratadine (CLARITIN PO) Take  by mouth. Provider, Historical   methotrexate (RHEUMATREX) 2.5 mg tablet 2 (TWO) TABLET, ORAL, WEEKLY 9/25/19   Debby Aguilera NP   diphenoxylate-atropine (LOMOTIL) 2.5-0.025 mg per tablet Take 1 Tab by mouth four (4) times daily as needed for Diarrhea. Max Daily Amount: 4 Tabs. 9/9/19   Kyle Aguilera NP   cyclobenzaprine (FLEXERIL) 10 mg tablet Take 1 Tab by mouth three (3) times daily as needed for Muscle Spasm(s) (neck pain). 9/9/19   Kyle Aguilera NP   LORazepam (ATIVAN) 0.5 mg tablet Take 1 Tab by mouth nightly as needed (sleep).  Max Daily Amount: 0.5 mg. 9/9/19   Verna Aguilera NP   levothyroxine (SYNTHROID) 88 mcg tablet TAKE 1 TABLET BY MOUTH DAILY 8/22/19   Cuco Aguilera NP   escitalopram oxalate (LEXAPRO) 20 mg tablet TAKE 1 TABLET BY MOUTH DAILY 7/22/19   Verna Aguilera NP   folic acid (FOLVITE) 1 mg tablet TAKE TWO TABLETS BY MOUTH DAILY 6/10/19   Verna Aguilera NP   atorvastatin (LIPITOR) 40 mg tablet TAKE 1 TABLET BY MOUTH DAILY 5/24/19   Nettie Jiang MD   estrogens, conjugated, (PREMARIN) 0.45 mg tablet Premarin 0.45 mg tablet   1 Tab PO Daily for 3 months. Provider, Historical   dorzolamide-timolol (COSOPT) 22.3-6.8 mg/mL ophthalmic solution Cosopt 22.3 mg-6.8 mg/mL eye drops   Prescribed by non 606/706 Marion Del Rosario MD 5/12/17   Provider, Historical   Lactobacillus acidophilus (PROBIOTIC PO) Take  by mouth. Provider, Historical   clobetasol (TEMOVATE) 0.05 % ointment two (2) times daily as needed. 10/23/18   Provider, Historical   dicyclomine (BENTYL) 10 mg capsule TAKE 1 CAPSULE BY MOUTH 4 TIMES A DAY  Patient taking differently: TAKE 1 CAPSULE BY MOUTH 4 TIMES A DAY prn 1/15/18   Richard Abraham II, MD   PREMARIN 0.625 mg tablet TAKE 1 TABLET BY MOUTH EVERY DAY  Patient taking differently: Take 0.3 mg by mouth daily. 12/28/17   Nettie Jiang MD   naproxen sodium (ALEVE) 220 mg tablet Take 220 mg by mouth two (2) times daily (with meals). Provider, Historical   travoprost (TRAVATAN Z) 0.004 % ophthalmic solution Administer 1 Drop to both eyes nightly. Provider, Historical   minocycline (MINOCIN, DYNACIN) 100 mg capsule Take 100 mg by mouth daily. Provider, Historical   MAGNESIUM PO Take 500 mg by mouth daily. Provider, Historical   lysine (L-LYSINE) 500 mg cap Take 500 mg by mouth daily as needed (cold sores). Provider, Historical   CETIRIZINE HCL (ZYRTEC PO) Take 10 mg by mouth daily.     Provider, Historical     Current Facility-Administered Medications   Medication Dose Route Frequency  sodium chloride (NS) flush 5-40 mL  5-40 mL IntraVENous Q8H    sodium chloride (NS) flush 5-40 mL  5-40 mL IntraVENous PRN     Current Outpatient Medications   Medication Sig    pantoprazole (PROTONIX) 40 mg tablet TAKE 1 TABLET BY MOUTH TWICE DAILY FOR 10 DAYS THEN TAKE 1 TABLET BY MOUTH DAILY    loratadine (CLARITIN PO) Take  by mouth.  methotrexate (RHEUMATREX) 2.5 mg tablet 2 (TWO) TABLET, ORAL, WEEKLY    diphenoxylate-atropine (LOMOTIL) 2.5-0.025 mg per tablet Take 1 Tab by mouth four (4) times daily as needed for Diarrhea. Max Daily Amount: 4 Tabs.  cyclobenzaprine (FLEXERIL) 10 mg tablet Take 1 Tab by mouth three (3) times daily as needed for Muscle Spasm(s) (neck pain).  LORazepam (ATIVAN) 0.5 mg tablet Take 1 Tab by mouth nightly as needed (sleep). Max Daily Amount: 0.5 mg.    levothyroxine (SYNTHROID) 88 mcg tablet TAKE 1 TABLET BY MOUTH DAILY    escitalopram oxalate (LEXAPRO) 20 mg tablet TAKE 1 TABLET BY MOUTH DAILY    folic acid (FOLVITE) 1 mg tablet TAKE TWO TABLETS BY MOUTH DAILY    atorvastatin (LIPITOR) 40 mg tablet TAKE 1 TABLET BY MOUTH DAILY    estrogens, conjugated, (PREMARIN) 0.45 mg tablet Premarin 0.45 mg tablet   1 Tab PO Daily for 3 months.  dorzolamide-timolol (COSOPT) 22.3-6.8 mg/mL ophthalmic solution Cosopt 22.3 mg-6.8 mg/mL eye drops   Prescribed by [facility] MD    Lactobacillus acidophilus (PROBIOTIC PO) Take  by mouth.  clobetasol (TEMOVATE) 0.05 % ointment two (2) times daily as needed.  dicyclomine (BENTYL) 10 mg capsule TAKE 1 CAPSULE BY MOUTH 4 TIMES A DAY (Patient taking differently: TAKE 1 CAPSULE BY MOUTH 4 TIMES A DAY prn)    PREMARIN 0.625 mg tablet TAKE 1 TABLET BY MOUTH EVERY DAY (Patient taking differently: Take 0.3 mg by mouth daily.)    naproxen sodium (ALEVE) 220 mg tablet Take 220 mg by mouth two (2) times daily (with meals).  travoprost (TRAVATAN Z) 0.004 % ophthalmic solution Administer 1 Drop to both eyes nightly.     minocycline (MINOCIN, DYNACIN) 100 mg capsule Take 100 mg by mouth daily.  MAGNESIUM PO Take 500 mg by mouth daily.  lysine (L-LYSINE) 500 mg cap Take 500 mg by mouth daily as needed (cold sores).  CETIRIZINE HCL (ZYRTEC PO) Take 10 mg by mouth daily. Allergies   Allergen Reactions    Cephalosporins Shortness of Breath    Codeine Nausea and Vomiting    Pcn [Penicillins] Rash        Review of Systems:  A comprehensive review of systems was negative except for that written in the HPI. Mental Status: no dementia    Objective:     Patient Vitals for the past 8 hrs:   BP Temp Pulse Resp SpO2 Height Weight   19 1518 131/86  67 16 97 %     19 1338 131/65 97.2 °F (36.2 °C) 68 16 98 % 4' 11\" (1.499 m) 57 kg (125 lb 10.6 oz)     Temp (24hrs), Av.2 °F (36.2 °C), Min:97.2 °F (36.2 °C), Max:97.2 °F (36.2 °C)      Gen: Well-developed,  in no acute distress   Musc: RUE - + wrist edema, deformity, NVI    Skin: No skin breakdown noted. Skin warm, pink, dry  Neuro: Cranial nerves are grossly intact, no focal motor weakness, follows commands appropriately   Psych: Good insight, oriented to person, place and time, alert    Imaging Review:   Impression:    IMPRESSION: There is an acute, displaced comminuted, intra-articular fracture of  the distal radius. There is an acute ulnar styloid fracture with approximately 2  mm of distraction. No additional fracture seen. Osseous structures are diffusely  demineralized.     IMPRESSION: Acute distal radius and ulnar styloid fractures, as described  above. Narrative:    INDICATION: Right wrist pain with deformity after falling of stool in kitchen. AP, lateral, oblique views of the right wrist were performed.                      Labs: No results found for this or any previous visit (from the past 24 hour(s)).       Impression:     Patient Active Problem List    Diagnosis Date Noted    Acute recurrent pansinusitis 2018    Epigastric pain 2017  GERD (gastroesophageal reflux disease) 11/27/2017    Herpes zoster without complication 34/86/3552    Labral tear of hip, degenerative 10/24/2017    Chronic eczematous otitis externa of both ears 08/24/2017    Dyslipidemia 08/24/2017    Monilial vaginitis 08/24/2017    History of endometrial cancer 08/03/2017    History of colonoscopy with polypectomy 08/03/2017    Thrush 08/03/2017    Spondylosis of cervical region without myelopathy or radiculopathy 08/03/2017    Arthritis of right hip 08/03/2017    Rash 08/03/2017    Osteoporosis 08/03/2017    Hypothyroid 08/03/2017    Hyperlipidemia 08/03/2017    Post-menopausal 08/03/2017    Contusion of left knee, initial encounter 08/03/2017    Degenerative arthritis of lumbar spine 08/03/2017    IBS (irritable bowel syndrome) 08/03/2017    Long-term use of immunosuppressant medication 08/03/2017    Anxiety 08/03/2017    Muscle spasm 08/03/2017    Avascular necrosis of femoral head ([address] left 08/03/2017    Pyuria, sterile 08/03/2017    Somatic dysfunction of cervical region 08/03/2017    Elevated liver function tests 08/03/2017    AMPARO (iron deficiency anemia) 08/03/2017    MIAN (generalized anxiety disorder) 08/03/2017    Medication side effect 08/03/2017    Arthralgia 08/03/2017    Rosacea blepharoconjunctivitis 08/03/2017    Insomnia 08/03/2017    Melena 08/03/2017    Headache, acute 08/03/2017    Psoriasis 08/03/2017    Sciatica 08/03/2017    Urticaria 08/03/2017    Glaucoma 08/03/2017    Bone loss 08/03/2017     Active Problems:    * No active hospital problems. *      Plan:   -  Pt is stable orthopaedically  -  Closed reduction in ED, see procedure note   -  Pt to follow up with Dr. Maggie Lebron, ice and elevate with pain Rx per ED     Dr. Apolinar Burrell aware and agrees with plan as above.         Orestes Silva, NP  Orthopedic Nurse Practitioner   [place]

## 2019-11-09 NOTE — ED PROVIDER NOTES
EMERGENCY DEPARTMENT HISTORY AND PHYSICAL EXAM      Date: 11/9/2019  Patient Name: June B Larry Holter    History of Presenting Illness     Chief Complaint   Patient presents with    Wrist Pain     right wrist pain with deformity after falling off a stool in the kitchen while trying to get something out of an upper cabinet. no loc       History Provided By: Patient and Patient's     HPI: June B Larry Holter, 78 y.o. female presents ambulatory to the Emergency Dept with her  in attendance, c/o severe R wrist pain after falling from a stool in the kitchen when trying to reach an upper cabinet. She reportedly landed on outstretched hand/wrist.  She denied striking her head. No LOC. No head, neck, or back pain. \"I landed on my butt\". Pt denied hip/sacral pain. Notable deformity noted to wrist.  She denied numbness/tingling. She denied h/o injury to hand/wrist previously. She has seen [facility] in the past.  She denied abrasion/laceration. She denied use of blood thinners. Pt is o/w healthy without fever, chills, cough, congestion, ST, shortness of breath, chest pain, N/V/D. Chief Complaint: R wrist pain after GLF just PTA  Duration: 1 Hours  Timing:  Acute  Location: R wrist  Quality: Aching  Severity: Severe  Modifying Factors: increased pain with movement/palpation  Associated Symptoms: denies any other associated signs or symptoms        There are no other complaints, changes, or physical findings at this time.     PCP: Rasta Martínez NP    Current Facility-Administered Medications   Medication Dose Route Frequency Provider Last Rate Last Dose    sodium chloride (NS) flush 5-40 mL  5-40 mL IntraVENous Q8H Alberto Chaudhry [place]        sodium chloride (NS) flush 5-40 mL  5-40 mL IntraVENous PRN Davina Carrera         Current Outpatient Medications   Medication Sig Dispense Refill    oxyCODONE IR (ROXICODONE) 5 mg immediate release tablet Take 1 Tab by mouth every six (6) hours as needed for Pain for up to 5 days. Max Daily Amount: 20 mg. 12 Tab 0    ondansetron (ZOFRAN ODT) 4 mg disintegrating tablet Take 1 Tab by mouth every eight (8) hours as needed for Nausea for up to 10 days. 10 Tab 0    pantoprazole (PROTONIX) 40 mg tablet TAKE 1 TABLET BY MOUTH TWICE DAILY FOR 10 DAYS THEN TAKE 1 TABLET BY MOUTH DAILY 40 Tab 0    loratadine (CLARITIN PO) Take  by mouth.  methotrexate (RHEUMATREX) 2.5 mg tablet 2 (TWO) TABLET, ORAL, WEEKLY 20 Tab 10    diphenoxylate-atropine (LOMOTIL) 2.5-0.025 mg per tablet Take 1 Tab by mouth four (4) times daily as needed for Diarrhea. Max Daily Amount: 4 Tabs. 40 Tab 0    cyclobenzaprine (FLEXERIL) 10 mg tablet Take 1 Tab by mouth three (3) times daily as needed for Muscle Spasm(s) (neck pain). 60 Tab 0    LORazepam (ATIVAN) 0.5 mg tablet Take 1 Tab by mouth nightly as needed (sleep). Max Daily Amount: 0.5 mg. 30 Tab 0    levothyroxine (SYNTHROID) 88 mcg tablet TAKE 1 TABLET BY MOUTH DAILY 90 Tab 3    escitalopram oxalate (LEXAPRO) 20 mg tablet TAKE 1 TABLET BY MOUTH DAILY 90 Tab 3    folic acid (FOLVITE) 1 mg tablet TAKE TWO TABLETS BY MOUTH DAILY 180 Tab 2    atorvastatin (LIPITOR) 40 mg tablet TAKE 1 TABLET BY MOUTH DAILY 90 Tab 3    estrogens, conjugated, (PREMARIN) 0.45 mg tablet Premarin 0.45 mg tablet   1 Tab PO Daily for 3 months.  dorzolamide-timolol (COSOPT) 22.3-6.8 mg/mL ophthalmic solution Cosopt 22.3 mg-6.8 mg/mL eye drops   Prescribed by non 606/706 Marion Del Rosario MD      Lactobacillus acidophilus (PROBIOTIC PO) Take  by mouth.  clobetasol (TEMOVATE) 0.05 % ointment two (2) times daily as needed.       dicyclomine (BENTYL) 10 mg capsule TAKE 1 CAPSULE BY MOUTH 4 TIMES A DAY (Patient taking differently: TAKE 1 CAPSULE BY MOUTH 4 TIMES A DAY prn) 120 Cap 3    PREMARIN 0.625 mg tablet TAKE 1 TABLET BY MOUTH EVERY DAY (Patient taking differently: Take 0.3 mg by mouth daily.) 90 Tab 2    naproxen sodium (ALEVE) 220 mg tablet Take 220 mg by mouth two (2) times daily (with meals).  travoprost (TRAVATAN Z) 0.004 % ophthalmic solution Administer 1 Drop to both eyes nightly.  minocycline (MINOCIN, DYNACIN) 100 mg capsule Take 100 mg by mouth daily.  MAGNESIUM PO Take 500 mg by mouth daily.  lysine (L-LYSINE) 500 mg cap Take 500 mg by mouth daily as needed (cold sores).  CETIRIZINE HCL (ZYRTEC PO) Take 10 mg by mouth daily.          Past History     Past Medical History:  Past Medical History:   Diagnosis Date    Annual physical exam 8/3/2017    Anxiety 8/3/2017    Arthralgia 8/3/2017    Arthritis     Arthritis, hip 8/3/2017    Autoimmune disease ([facility] [address]     psoriasis    Avascular necrosis of femoral head ([facility] [address] left 8/3/2017    Bone loss 8/3/2017    Cancer ([facility] [address]     endometrial    Contusion of left knee, initial encounter 8/3/2017    Degenerative arthritis of lumbar spine 8/3/2017    Depression     Elevated liver function tests 8/3/2017    MIAN (generalized anxiety disorder) 8/3/2017    GERD (gastroesophageal reflux disease)     Glaucoma     Glaucoma 8/3/2017    Headache, acute 8/3/2017    History of colonoscopy with polypectomy 8/3/2017    History of endometrial cancer 8/3/2017    Hyperlipidemia 8/3/2017    Hypothyroid 8/3/2017    IBS (irritable bowel syndrome) 8/3/2017    AMPARO (iron deficiency anemia) 8/3/2017    Insomnia 8/3/2017    Long-term use of immunosuppressant medication 8/3/2017    Medication side effect 8/3/2017    Melena 8/3/2017    Muscle spasm 8/3/2017    Osteoporosis 8/3/2017    Other ill-defined conditions(799.89)     high cholesterol    Other ill-defined conditions(799.89)     psoriasis    Post-menopausal 8/3/2017    Psoriasis 8/3/2017    Pyuria, sterile 8/3/2017    Rash 8/3/2017    Rosacea blepharoconjunctivitis 8/3/2017    Sciatica 8/3/2017    Somatic dysfunction of cervical region 8/3/2017    Spondylosis of cervical region without myelopathy or radiculopathy 8/3/2017    Thrush 8/3/2017    Thyroid disease     Urticaria 8/3/2017       Past Surgical History:  Past Surgical History:   Procedure Laterality Date    COLONOSCOPY N/A 2019    COLON[facility]ui [facility] at [facility] ENDOSCOPY    COLONOSCOPY,DIAGNOSTIC  2019         COLORECTAL SCRN; HI RISK IND  2014         HX APPENDECTOMY      HX GYN      hysterectomy    HX HEENT      bilateral ear    HX HEENT      LASIK    HX ORTHOPAEDIC  -    lumbar laminectomy    HX OTHER SURGICAL Bilateral     cataract extraction with lens implant    HX TONSILLECTOMY      NEUROLOGICAL PROCEDURE UNLISTED      spinal fusion       Family History:  Family History   Problem Relation Age of Onset    Heart Disease Mother     Other Mother         kidney stones    Diabetes Father     Cancer Sister         colon cancer    Heart Disease Brother     Diabetes Brother     Other Brother         kidney stones    Colon Cancer Brother     Heart Disease Brother     Other Brother         kidney stones    Heart Disease Brother     Heart Disease Brother     Cancer Brother         melanoma       Social History:  Social History     Tobacco Use    Smoking status: Former Smoker     Last attempt to quit: 1979     Years since quittin.8    Smokeless tobacco: Never Used   Substance Use Topics    Alcohol use: Yes     Alcohol/week: 7.0 standard drinks     Types: 7 Glasses of wine per week    Drug use: No       Allergies: Allergies   Allergen Reactions    Cephalosporins Shortness of Breath    Codeine Nausea and Vomiting    Pcn [Penicillins] Rash         Review of Systems   Review of Systems   Constitutional: Negative for chills and fever. HENT: Negative for congestion, rhinorrhea and sore throat. Eyes: Negative for photophobia and visual disturbance. Respiratory: Negative for cough and shortness of breath. Cardiovascular: Negative for chest pain and palpitations.    Gastrointestinal: Negative for abdominal pain, diarrhea, nausea and vomiting. Endocrine: Negative for polydipsia, polyphagia and polyuria. Genitourinary: Negative for dysuria and hematuria. Musculoskeletal: Positive for arthralgias. Negative for neck pain and neck stiffness. Skin: Negative for rash and wound. Allergic/Immunologic: Negative for food allergies and immunocompromised state. Neurological: Negative for weakness, numbness and headaches. Hematological: Negative for adenopathy. Does not bruise/bleed easily. Psychiatric/Behavioral: Negative for agitation and confusion. Physical Exam   Physical Exam   Constitutional: She is oriented to person, place, and time. She appears well-developed and well-nourished. No distress. WDWN elderly female, alert, in NAD   HENT:   Head: Normocephalic and atraumatic. Nose: Nose normal.   Eyes: Pupils are equal, round, and reactive to light. Conjunctivae and EOM are normal. Right eye exhibits no discharge. Left eye exhibits no discharge. No scleral icterus. Neck: Normal range of motion. Neck supple. No JVD present. No tracheal deviation present. No thyromegaly present. No midline cervical spinal tenderness   Cardiovascular: Normal rate, regular rhythm and normal heart sounds. Pulmonary/Chest: Effort normal and breath sounds normal. No respiratory distress. She has no wheezes. She exhibits no tenderness. Abdominal: Soft. There is no tenderness. Musculoskeletal: She exhibits edema, tenderness and deformity. Decreased A/P ROM to R wrist due to tenderness with palpation/movement, + deformity, +soft tissue swelling, no erythema/rash/lesion/heat. 2+ distal pulses, NVI, sensation grossly intact to light touch. Hand and elbow nontender. No spinal tenderness. Hips/pelvis nontender. Lymphadenopathy:     She has no cervical adenopathy. Neurological: She is alert and oriented to person, place, and time. She exhibits normal muscle tone.  Coordination normal. Skin: Skin is warm and dry. She is not diaphoretic. Psychiatric: She has a normal mood and affect. Her behavior is normal. Judgment normal.   Nursing note and vitals reviewed. Diagnostic Study Results     Labs -   No results found for this or any previous visit (from the past 12 hour(s)). Radiologic Studies -   XR WRIST RT AP/LAT   Final Result   IMPRESSION: There is an acute, mildly displaced, comminuted, intra-articular   fracture of the distal radius. There is an acute ulnar styloid fracture with   approximately 2 mm of distraction. XR WRIST RT AP/LAT/OBL MIN 3V   Final Result   IMPRESSION: There is an acute, displaced comminuted, intra-articular fracture of   the distal radius. There is an acute ulnar styloid fracture with approximately 2   mm of distraction. No additional fracture seen. Osseous structures are diffusely   demineralized. IMPRESSION: Acute distal radius and ulnar styloid fractures, as described   above. Medical Decision Making   I am the first provider for this patient. I reviewed the vital signs, available nursing notes, past medical history, past surgical history, family history and social history. Vital Signs-Reviewed the patient's vital signs. Patient Vitals for the past 12 hrs:   Temp Pulse Resp BP SpO2   11/09/19 1600    123/69 98 %   11/09/19 1518  67 16 131/86 97 %   11/09/19 1338 97.2 °F (36.2 °C) 68 16 131/65 98 %         Records Reviewed: Nursing Notes, Old Medical Records, Previous Radiology Studies and Previous Laboratory Studies    Provider Notes (Medical Decision Making):   Fracture, dislocation, sprain    ED Course:   Initial assessment performed. The patients presenting problems have been discussed, and they are in agreement with the care plan formulated and outlined with them. I have encouraged them to ask questions as they arise throughout their visit.     CONSULT: ORTHOPEDICS  Case discussed with Benito Kocher, NP/Sage Palomino MD via [employer]  Manas Cowart will come in to the ED to attempt to reduce fracture/splint placement. DISCHARGE NOTE:  The care plan has been outline with the patient and/or family, who verbally conveyed understanding and agreement. Available results have been reviewed. Patient and/or family understand the follow up plan as outlined and discharge instructions. Should their condition deterioration at any time after discharge the patient agrees to return, follow up sooner than outlined or seek medical assistance at the closest Emergency Room as soon as possible. Questions have been answered. Discharge instructions and educational information regarding the patient's diagnosis as well a list of reasons why the patient would want to seek immediate medical attention, should their condition change, were reviewed directly with the patient/family       PLAN:  1. Discharge Medication List as of 11/9/2019  3:51 PM      CONTINUE these medications which have NOT CHANGED    Details   pantoprazole (PROTONIX) 40 mg tablet TAKE 1 TABLET BY MOUTH TWICE DAILY FOR 10 DAYS THEN TAKE 1 TABLET BY MOUTH DAILY, Normal, Disp-40 Tab, R-0      loratadine (CLARITIN PO) Take  by mouth., Historical Med      methotrexate (RHEUMATREX) 2.5 mg tablet 2 (TWO) TABLET, ORAL, WEEKLY, Normal, Disp-20 Tab, R-10      diphenoxylate-atropine (LOMOTIL) 2.5-0.025 mg per tablet Take 1 Tab by mouth four (4) times daily as needed for Diarrhea. Max Daily Amount: 4 Tabs., Print, Disp-40 Tab, R-0      cyclobenzaprine (FLEXERIL) 10 mg tablet Take 1 Tab by mouth three (3) times daily as needed for Muscle Spasm(s) (neck pain). , Normal, Disp-60 Tab, R-0      LORazepam (ATIVAN) 0.5 mg tablet Take 1 Tab by mouth nightly as needed (sleep).  Max Daily Amount: 0.5 mg., Print, Disp-30 Tab, R-0      levothyroxine (SYNTHROID) 88 mcg tablet TAKE 1 TABLET BY MOUTH DAILY, Normal, Disp-90 Tab, R-3      escitalopram oxalate (LEXAPRO) 20 mg tablet TAKE 1 TABLET BY MOUTH DAILY, Normal, Disp-90 Tab, R-3      folic acid (FOLVITE) 1 mg tablet TAKE TWO TABLETS BY MOUTH DAILY, Normal, Disp-180 Tab, R-2      atorvastatin (LIPITOR) 40 mg tablet TAKE 1 TABLET BY MOUTH DAILY, Normal, Disp-90 Tab, R-3      !! estrogens, conjugated, (PREMARIN) 0.45 mg tablet Premarin 0.45 mg tablet   1 Tab PO Daily for 3 months., Historical Med      dorzolamide-timolol (COSOPT) 22.3-6.8 mg/mL ophthalmic solution Cosopt 22.3 mg-6.8 mg/mL eye drops   Prescribed by non [facility] MD, Historical Med      Lactobacillus acidophilus (PROBIOTIC PO) Take  by mouth., Historical Med      clobetasol (TEMOVATE) 0.05 % ointment two (2) times daily as needed., Historical Med      dicyclomine (BENTYL) 10 mg capsule TAKE 1 CAPSULE BY MOUTH 4 TIMES A DAY, Normal, Disp-120 Cap, R-3      !! PREMARIN 0.625 mg tablet TAKE 1 TABLET BY MOUTH EVERY DAY, Normal, Disp-90 Tab, R-2      naproxen sodium (ALEVE) 220 mg tablet Take 220 mg by mouth two (2) times daily (with meals). , Historical Med      travoprost (TRAVATAN Z) 0.004 % ophthalmic solution Administer 1 Drop to both eyes nightly., Historical Med      minocycline (MINOCIN, DYNACIN) 100 mg capsule Take 100 mg by mouth daily. , Historical Med      MAGNESIUM PO Take 500 mg by mouth daily. , Historical Med      lysine (L-LYSINE) 500 mg cap Take 500 mg by mouth daily as needed (cold sores). , Historical Med      CETIRIZINE HCL (ZYRTEC PO) Take 10 mg by mouth daily. , Historical Med       !! - Potential duplicate medications found. Please discuss with provider. 2.   Follow-up Information     Follow up With Specialties Details Why Contact Info    Juliana Paez MD Hand Surgery On 11/11/2019  [address]  [address]  [facility]  [phone]      [facility] EMERGENCY DEPT Emergency Medicine  If symptoms worsen [address]  [address] [facility]  [phone]        Return to ED if worse     Diagnosis     Clinical Impression:   1.  Closed fracture distal radius and ulna, right, initial encounter    2.  Fall, initial encounter

## 2019-11-09 NOTE — PROCEDURES
PROCEDURE NOTE - FRACTURE REDUCTION: The patient was medicated with PO percocet and a hematoma block was placed. After it was confirmed that appropriate sedation had been reached, a longitudinal traction in conjunction with re-creation of the injury maneuver was applied reducing the fracture. Subsequently, a sugar tong splint was applied. The patient was aroused from anesthesia and tolerated the procedure well. Post-reduction plain films reviewed with confirmation of a satisfactory reduction of the fracture. The extremity was neurovascularly intact post reduction and splint placement.

## 2019-11-09 NOTE — ED NOTES
SHELDON Reich reviewed discharge instructions with the patient. The patient verbalized understanding. All questions and concerns were addressed. The patient declined a wheelchair and is discharged ambulatory in the care of family members with instructions and prescriptions in hand. Pt is alert and oriented x 4. Respirations are clear and unlabored.

## 2019-11-09 NOTE — DISCHARGE INSTRUCTIONS
Rest, ice, elevation. Follow up with Orthopedist for recheck. Return to the Emergency Dept for any worsening pain, numbness/tingling.

## 2019-12-04 RX ORDER — PANTOPRAZOLE SODIUM 40 MG/1
40 TABLET, DELAYED RELEASE ORAL DAILY
Qty: 90 TAB | Refills: 3 | Status: SHIPPED | OUTPATIENT
Start: 2019-12-04 | End: 2020-12-02 | Stop reason: SDUPTHER

## 2020-02-17 PROBLEM — B37.0 THRUSH: Status: RESOLVED | Noted: 2017-08-03 | Resolved: 2020-02-17

## 2020-02-17 PROBLEM — M47.812 SPONDYLOSIS OF CERVICAL REGION WITHOUT MYELOPATHY OR RADICULOPATHY: Status: RESOLVED | Noted: 2017-08-03 | Resolved: 2020-02-17

## 2020-02-17 PROBLEM — M16.11 ARTHRITIS OF RIGHT HIP: Status: RESOLVED | Noted: 2017-08-03 | Resolved: 2020-02-17

## 2020-02-17 PROBLEM — M54.30 SCIATICA: Status: RESOLVED | Noted: 2017-08-03 | Resolved: 2020-02-17

## 2020-02-17 PROBLEM — H40.9 GLAUCOMA: Status: RESOLVED | Noted: 2017-08-03 | Resolved: 2020-02-17

## 2020-02-17 PROBLEM — M85.80 BONE LOSS: Status: RESOLVED | Noted: 2017-08-03 | Resolved: 2020-02-17

## 2020-02-17 PROBLEM — Z98.890 HISTORY OF COLONOSCOPY WITH POLYPECTOMY: Status: RESOLVED | Noted: 2017-08-03 | Resolved: 2020-02-17

## 2020-02-17 PROBLEM — R21 RASH: Status: RESOLVED | Noted: 2017-08-03 | Resolved: 2020-02-17

## 2020-02-17 PROBLEM — B37.31 MONILIAL VAGINITIS: Status: RESOLVED | Noted: 2017-08-24 | Resolved: 2020-02-17

## 2020-02-17 PROBLEM — M81.0 OSTEOPOROSIS: Status: RESOLVED | Noted: 2017-08-03 | Resolved: 2020-02-17

## 2020-02-17 PROBLEM — R82.81 PYURIA, STERILE: Status: RESOLVED | Noted: 2017-08-03 | Resolved: 2020-02-17

## 2020-02-17 PROBLEM — H60.8X3 CHRONIC ECZEMATOUS OTITIS EXTERNA OF BOTH EARS: Status: RESOLVED | Noted: 2017-08-24 | Resolved: 2020-02-17

## 2020-02-17 PROBLEM — L50.9 URTICARIA: Status: RESOLVED | Noted: 2017-08-03 | Resolved: 2020-02-17

## 2020-02-17 PROBLEM — M87.059 AVASCULAR NECROSIS OF FEMORAL HEAD (HCC): Status: RESOLVED | Noted: 2017-08-03 | Resolved: 2020-02-17

## 2020-02-17 PROBLEM — R79.89 ELEVATED LIVER FUNCTION TESTS: Status: RESOLVED | Noted: 2017-08-03 | Resolved: 2020-02-17

## 2020-02-17 PROBLEM — M25.50 ARTHRALGIA: Status: RESOLVED | Noted: 2017-08-03 | Resolved: 2020-02-17

## 2020-02-17 PROBLEM — J01.41 ACUTE RECURRENT PANSINUSITIS: Status: RESOLVED | Noted: 2018-02-23 | Resolved: 2020-02-17

## 2020-02-17 PROBLEM — Z86.010 HISTORY OF COLONOSCOPY WITH POLYPECTOMY: Status: RESOLVED | Noted: 2017-08-03 | Resolved: 2020-02-17

## 2020-02-17 PROBLEM — M62.838 MUSCLE SPASM: Status: RESOLVED | Noted: 2017-08-03 | Resolved: 2020-02-17

## 2020-02-17 PROBLEM — M47.816 DEGENERATIVE ARTHRITIS OF LUMBAR SPINE: Status: RESOLVED | Noted: 2017-08-03 | Resolved: 2020-02-17

## 2020-02-17 PROBLEM — M24.159 LABRAL TEAR OF HIP, DEGENERATIVE: Status: RESOLVED | Noted: 2017-10-24 | Resolved: 2020-02-17

## 2020-02-17 PROBLEM — B02.9 HERPES ZOSTER WITHOUT COMPLICATION: Status: RESOLVED | Noted: 2017-10-25 | Resolved: 2020-02-17

## 2020-02-17 PROBLEM — K92.1 MELENA: Status: RESOLVED | Noted: 2017-08-03 | Resolved: 2020-02-17

## 2020-02-17 PROBLEM — R51.9 HEADACHE, ACUTE: Status: RESOLVED | Noted: 2017-08-03 | Resolved: 2020-02-17

## 2020-02-17 PROBLEM — Z78.0 POST-MENOPAUSAL: Status: RESOLVED | Noted: 2017-08-03 | Resolved: 2020-02-17

## 2020-02-17 PROBLEM — M99.01 SOMATIC DYSFUNCTION OF CERVICAL REGION: Status: RESOLVED | Noted: 2017-08-03 | Resolved: 2020-02-17

## 2020-02-17 PROBLEM — T88.7XXA MEDICATION SIDE EFFECT: Status: RESOLVED | Noted: 2017-08-03 | Resolved: 2020-02-17

## 2020-02-17 PROBLEM — Z85.42 HISTORY OF ENDOMETRIAL CANCER: Status: RESOLVED | Noted: 2017-08-03 | Resolved: 2020-02-17

## 2020-02-17 PROBLEM — S80.02XA CONTUSION OF LEFT KNEE: Status: RESOLVED | Noted: 2017-08-03 | Resolved: 2020-02-17

## 2020-03-05 RX ORDER — FOLIC ACID 1 MG/1
TABLET ORAL
Qty: 180 TAB | Refills: 2 | Status: SHIPPED | OUTPATIENT
Start: 2020-03-05 | End: 2020-12-02 | Stop reason: SDUPTHER

## 2020-06-10 ENCOUNTER — OFFICE VISIT (OUTPATIENT)
Dept: INTERNAL MEDICINE CLINIC | Age: 80
End: 2020-06-10

## 2020-06-10 VITALS
HEART RATE: 60 BPM | SYSTOLIC BLOOD PRESSURE: 118 MMHG | OXYGEN SATURATION: 97 % | BODY MASS INDEX: 25.02 KG/M2 | TEMPERATURE: 97.5 F | DIASTOLIC BLOOD PRESSURE: 72 MMHG | WEIGHT: 124.1 LBS | RESPIRATION RATE: 16 BRPM | HEIGHT: 59 IN

## 2020-06-10 DIAGNOSIS — N39.0 URINARY TRACT INFECTION WITH HEMATURIA, SITE UNSPECIFIED: ICD-10-CM

## 2020-06-10 DIAGNOSIS — R53.83 FATIGUE, UNSPECIFIED TYPE: ICD-10-CM

## 2020-06-10 DIAGNOSIS — E55.9 VITAMIN D DEFICIENCY: ICD-10-CM

## 2020-06-10 DIAGNOSIS — Z00.00 MEDICARE ANNUAL WELLNESS VISIT, SUBSEQUENT: ICD-10-CM

## 2020-06-10 DIAGNOSIS — K58.0 IRRITABLE BOWEL SYNDROME WITH DIARRHEA: ICD-10-CM

## 2020-06-10 DIAGNOSIS — I10 ESSENTIAL HYPERTENSION: ICD-10-CM

## 2020-06-10 DIAGNOSIS — R73.9 HYPERGLYCEMIA: ICD-10-CM

## 2020-06-10 DIAGNOSIS — R31.9 URINARY TRACT INFECTION WITH HEMATURIA, SITE UNSPECIFIED: ICD-10-CM

## 2020-06-10 DIAGNOSIS — E03.9 ACQUIRED HYPOTHYROIDISM: Primary | ICD-10-CM

## 2020-06-10 DIAGNOSIS — E78.5 DYSLIPIDEMIA: ICD-10-CM

## 2020-06-10 DIAGNOSIS — K21.9 GASTROESOPHAGEAL REFLUX DISEASE, ESOPHAGITIS PRESENCE NOT SPECIFIED: ICD-10-CM

## 2020-06-10 LAB
25(OH)D3 SERPL-MCNC: 48 NG/ML (ref 30–96)
A-G RATIO,AGRAT: 1.4 RATIO
ALBUMIN SERPL-MCNC: 4.2 G/DL (ref 3.9–5.4)
ALP SERPL-CCNC: 81 U/L (ref 38–126)
ALT SERPL-CCNC: 34 U/L (ref 0–35)
ANION GAP SERPL CALC-SCNC: 6 MMOL/L
AST SERPL W P-5'-P-CCNC: 39 U/L (ref 14–36)
BACTERIA,BACTU: ABNORMAL
BILIRUB SERPL-MCNC: 0.6 MG/DL (ref 0.2–1.3)
BILIRUB UR QL: NEGATIVE
BUN SERPL-MCNC: 22 MG/DL (ref 7–17)
BUN/CREATININE RATIO,BUCR: 24 RATIO
CALCIUM SERPL-MCNC: 10.3 MG/DL (ref 8.4–10.2)
CHLORIDE SERPL-SCNC: 105 MMOL/L (ref 98–107)
CHOL/HDL RATIO,CHHD: 2 RATIO (ref 0–4)
CHOLEST SERPL-MCNC: 208 MG/DL (ref 0–200)
CLARITY: ABNORMAL
CO2 SERPL-SCNC: 27 MMOL/L (ref 22–32)
COLOR UR: ABNORMAL
CREAT SERPL-MCNC: 0.9 MG/DL (ref 0.7–1.2)
ERYTHROCYTE [DISTWIDTH] IN BLOOD BY AUTOMATED COUNT: 13.4 %
GLOBULIN,GLOB: 3
GLUCOSE 24H UR-MRATE: NEGATIVE G/(24.H)
GLUCOSE SERPL-MCNC: 83 MG/DL (ref 65–105)
HBA1C MFR BLD HPLC: 4.7 % (ref 4–5.7)
HCT VFR BLD AUTO: 43.5 % (ref 37–51)
HDLC SERPL-MCNC: 104 MG/DL (ref 35–130)
HGB BLD-MCNC: 13.9 G/DL (ref 12–18)
HGB UR QL STRIP: ABNORMAL
KETONES UR QL STRIP.AUTO: NEGATIVE
LDL/HDL RATIO,LDHD: 1 RATIO
LDLC SERPL CALC-MCNC: 75 MG/DL (ref 0–130)
LEUKOCYTE ESTERASE: ABNORMAL
LYMPHOCYTES ABSOLUTE: 1.4 K/UL (ref 0.6–4.1)
LYMPHOCYTES NFR BLD: 26.1 % (ref 10–58.5)
MCH RBC QN AUTO: 31.2 PG (ref 26–32)
MCHC RBC AUTO-ENTMCNC: 32 G/DL (ref 30–36)
MCV RBC AUTO: 97.7 FL (ref 80–97)
MONOCYTES ABS-DIF,2141: 0.5 K/UL (ref 0–1.8)
MONOCYTES NFR BLD: 8.9 % (ref 0.1–24)
NEUTROPHILS # BLD: 65 % (ref 37–92)
NEUTROPHILS ABS,2156: 3.5 K/UL (ref 2–7.8)
NITRITE UR QL STRIP.AUTO: NEGATIVE
PH UR STRIP: 7 [PH] (ref 5–7)
PLATELET # BLD AUTO: 251 K/UL (ref 140–440)
POTASSIUM SERPL-SCNC: 4.8 MMOL/L (ref 3.6–5)
PROT SERPL-MCNC: 7.2 G/DL (ref 6.3–8.2)
PROT UR STRIP-MCNC: ABNORMAL MG/DL
RBC # BLD AUTO: 4.45 M/UL (ref 4.2–6.3)
RBC #/AREA URNS HPF: ABNORMAL #/HPF
SODIUM SERPL-SCNC: 138 MMOL/L (ref 137–145)
SP GR UR REFRACTOMETRY: 1.01 (ref 1–1.03)
SQUAMOUS EPITHELIAL CELLS: ABNORMAL
T4 FREE SERPL-MCNC: 0.95 NG/DL (ref 0.58–2.3)
TRIGL SERPL-MCNC: 146 MG/DL (ref 0–200)
TSH SERPL DL<=0.05 MIU/L-ACNC: 0.96 UIU/ML (ref 0.34–5.6)
UROBILINOGEN UR QL STRIP.AUTO: NEGATIVE
VLDLC SERPL CALC-MCNC: 29 MG/DL
WBC # BLD AUTO: 5.4 K/UL (ref 4.1–10.9)
WBC URNS QL MICRO: ABNORMAL #/HPF

## 2020-06-10 NOTE — PATIENT INSTRUCTIONS
Medicare Wellness Visit, Female     The best way to live healthy is to have a lifestyle where you eat a well-balanced diet, exercise regularly, limit alcohol use, and quit all forms of tobacco/nicotine, if applicable. Regular preventive services are another way to keep healthy. Preventive services (vaccines, screening tests, monitoring & exams) can help personalize your care plan, which helps you manage your own care. Screening tests can find health problems at the earliest stages, when they are easiest to treat. [facility] follows the current, evidence-based guidelines published by the [employer] Chan ([facility]) when recommending preventive services for our patients. Because we follow these guidelines, sometimes recommendations change over time as research supports it. (For example, mammograms used to be recommended annually. Even though Medicare will still pay for an annual mammogram, the newer guidelines recommend a mammogram every two years for women of average risk). Of course, you and your doctor may decide to screen more often for some diseases, based on your risk and your co-morbidities (chronic disease you are already diagnosed with). Preventive services for you include:  - Medicare offers their members a free annual wellness visit, which is time for you and your primary care provider to discuss and plan for your preventive service needs. Take advantage of this benefit every year!  -All adults over the age of 72 should receive the recommended pneumonia vaccines. Current USPSTF guidelines recommend a series of two vaccines for the best pneumonia protection.   -All adults should have a flu vaccine yearly and a tetanus vaccine every 10 years.   -All adults age 48 and older should receive the shingles vaccines (series of two vaccines).       -All adults age 38-68 who are overweight should have a diabetes screening test once every three years.   -All adults born between [birth year] and 1965 should be screened once for Hepatitis C.  -Other screening tests and preventive services for persons with diabetes include: an eye exam to screen for diabetic retinopathy, a kidney function test, a foot exam, and stricter control over your cholesterol.   -Cardiovascular screening for adults with routine risk involves an electrocardiogram (ECG) at intervals determined by your doctor.   -Colorectal cancer screenings should be done for adults age 54-65 with no increased risk factors for colorectal cancer. There are a number of acceptable methods of screening for this type of cancer. Each test has its own benefits and drawbacks. Discuss with your doctor what is most appropriate for you during your annual wellness visit. The different tests include: colonoscopy (considered the best screening method), a fecal occult blood test, a fecal DNA test, and sigmoidoscopy.    -A bone mass density test is recommended when a woman turns 65 to screen for osteoporosis. This test is only recommended one time, as a screening. Some providers will use this same test as a disease monitoring tool if you already have osteoporosis. -Breast cancer screenings are recommended every other year for women of normal risk, age 54-69.  -Cervical cancer screenings for women over age 72 are only recommended with certain risk factors. Here is a list of your current Health Maintenance items (your personalized list of preventive services) with a due date:  Health Maintenance Due   Topic Date Due    Shingles Vaccine (2 of 2) 07/11/2019    Annual Well Visit  08/31/2019            Gastroesophageal Reflux Disease (GERD): Care Instructions  Your Care Instructions     Gastroesophageal reflux disease (GERD) is the backward flow of stomach acid into the esophagus. The esophagus is the tube that leads from your throat to your stomach. A one-way valve prevents the stomach acid from backing up into this tube.  When you have GERD, this valve does not close tightly enough. This can also cause pain and swelling in your esophagus (esophagitis). If you have mild GERD symptoms including heartburn, you may be able to control the problem with antacids or over-the-counter medicine. Changing your diet and eating habits, such as not eating late at night, losing weight, and making other lifestyle changes can also help reduce symptoms. Follow-up care is a key part of your treatment and safety. Be sure to make and go to all appointments, and call your doctor if you are having problems. It's also a good idea to know your test results and keep a list of the medicines you take. How can you care for yourself at home? · Take your medicines exactly as prescribed. Call your doctor if you think you are having a problem with your medicine. · Your doctor may recommend over-the-counter medicine. For mild or occasional indigestion, antacids, such as Tums, Gaviscon, Mylanta, or Maalox, may help. Your doctor also may recommend over-the-counter acid reducers, such as Pepcid AC (famotidine), Tagamet HB (cimetidine), or Prilosec (omeprazole). Read and follow all instructions on the label. If you use these medicines often, talk with your doctor. · Change your eating habits. ? It's best to eat several small meals instead of two or three large meals. ? After you eat, wait 2 to 3 hours before you lie down. ? Chocolate, mint, and alcohol can make GERD worse. ? Spicy foods, foods that have a lot of acid (like tomatoes and oranges), and coffee can make GERD symptoms worse in some people. If your symptoms are worse after you eat a certain food, you may want to stop eating that food to see if your symptoms get better. · Do not smoke or chew tobacco. Smoking can make GERD worse. If you need help quitting, talk to your doctor about stop-smoking programs and medicines. These can increase your chances of quitting for good.   · If you have GERD symptoms at night, raise the head of your bed 6 to 8 inches by putting the frame on blocks or placing a foam wedge under the head of your mattress. (Adding extra pillows does not work.)  · Do not wear tight clothing around your middle. · Lose weight if you need to. Losing just 5 to 10 pounds can help. When should you call for help? Call your doctor now or seek immediate medical care if:  · You have new or different belly pain. · Your stools are black and tarlike or have streaks of blood. Watch closely for changes in your health, and be sure to contact your doctor if:  · Your symptoms have not improved after 2 days. · Food seems to catch in your throat or chest.  Where can you learn more? Go to http://yoandy-graham.info/  Enter T787 in the search box to learn more about \"Gastroesophageal Reflux Disease (GERD): Care Instructions. \"  Current as of: August 12, 2019               Content Version: 12.5  © 3551-4176 [employer], [employer]. Care instructions adapted under license by [employer] (which disclaims liability or warranty for this information). If you have questions about a medical condition or this instruction, always ask your healthcare professional. [address] any warranty or liability for your use of this information.

## 2020-06-10 NOTE — PROGRESS NOTES
This is the Subsequent Medicare Annual Wellness Exam, performed 12 months or more after the Initial AWV or the last Subsequent AWV    I have reviewed the patient's medical history in detail and updated the computerized patient record.      History     Patient Active Problem List   Diagnosis Code    Hypothyroid E03.9    Hyperlipidemia E78.5    IBS (irritable bowel syndrome) K58.9    Long-term use of immunosuppressant medication Z79.899    Anxiety F41.9    AMPARO (iron deficiency anemia) D50.9    MIAN (generalized anxiety disorder) F41.1    Rosacea blepharoconjunctivitis H10.829    Insomnia G47.00    Psoriasis L40.9    Dyslipidemia E78.5    Epigastric pain R10.13    GERD (gastroesophageal reflux disease) K21.9     Past Medical History:   Diagnosis Date    Acute recurrent pansinusitis 2/23/2018    Annual physical exam 8/3/2017    Anxiety 8/3/2017    Arthralgia 8/3/2017    Arthritis     Arthritis of right hip 8/3/2017    Arthritis, hip 8/3/2017    Autoimmune disease ([facility] [address]     psoriasis    Avascular necrosis of femoral head ([facility] [address] left 8/3/2017    Bone loss 8/3/2017    Cancer (HCC)     endometrial    Chronic eczematous otitis externa of both ears 8/24/2017    Contusion of left knee, initial encounter 8/3/2017    Degenerative arthritis of lumbar spine 8/3/2017    Depression     Elevated liver function tests 8/3/2017    MIAN (generalized anxiety disorder) 8/3/2017    GERD (gastroesophageal reflux disease)     Glaucoma     Glaucoma 8/3/2017    Headache, acute 8/3/2017    Herpes zoster without complication 06/70/6399    History of colonoscopy with polypectomy 8/3/2017    History of endometrial cancer 8/3/2017    Hyperlipidemia 8/3/2017    Hypothyroid 8/3/2017    IBS (irritable bowel syndrome) 8/3/2017    AMPARO (iron deficiency anemia) 8/3/2017    Insomnia 8/3/2017    Labral tear of hip, degenerative 10/24/2017    Long-term use of immunosuppressant medication 8/3/2017    Medication side effect 8/3/2017    Melena 8/3/2017    Monilial vaginitis 8/24/2017    Muscle spasm 8/3/2017    Osteoporosis 8/3/2017    Other ill-defined conditions(799.89)     high cholesterol    Other ill-defined conditions(799.89)     psoriasis    Post-menopausal 8/3/2017    Psoriasis 8/3/2017    Pyuria, sterile 8/3/2017    Rash 8/3/2017    Rosacea blepharoconjunctivitis 8/3/2017    Sciatica 8/3/2017    Somatic dysfunction of cervical region 8/3/2017    Spondylosis of cervical region without myelopathy or radiculopathy 8/3/2017    Thrush 8/3/2017    Thyroid disease     Urticaria 8/3/2017      Past Surgical History:   Procedure Laterality Date    COLONOSCOPY N/A 6/12/2019    COLON[facility] performed by Conrado Charles [facility] at [facility]  6/12/2019         COLORECTAL SCRN; HI RISK IND  4/2/2014         HX APPENDECTOMY      HX GYN      hysterectomy    HX HEENT      bilateral ear    HX HEENT      LASIK    HX ORTHOPAEDIC  1-2011    lumbar laminectomy    HX OTHER SURGICAL Bilateral     cataract extraction with lens implant    HX TONSILLECTOMY      NEUROLOGICAL PROCEDURE UNLISTED  2011    spinal fusion     Current Outpatient Medications   Medication Sig Dispense Refill    folic acid (FOLVITE) 1 mg tablet TAKE 2 TABLETS BY MOUTH DAILY 180 Tab 2    pantoprazole (PROTONIX) 40 mg tablet Take 1 Tab by mouth daily. 90 Tab 3    loratadine (CLARITIN PO) Take  by mouth.  methotrexate (RHEUMATREX) 2.5 mg tablet 2 (TWO) TABLET, ORAL, WEEKLY 20 Tab 10    diphenoxylate-atropine (LOMOTIL) 2.5-0.025 mg per tablet Take 1 Tab by mouth four (4) times daily as needed for Diarrhea. Max Daily Amount: 4 Tabs. 40 Tab 0    cyclobenzaprine (FLEXERIL) 10 mg tablet Take 1 Tab by mouth three (3) times daily as needed for Muscle Spasm(s) (neck pain). 60 Tab 0    LORazepam (ATIVAN) 0.5 mg tablet Take 1 Tab by mouth nightly as needed (sleep).  Max Daily Amount: 0.5 mg. 30 Tab 0    levothyroxine (SYNTHROID) 88 mcg tablet TAKE 1 TABLET BY MOUTH DAILY 90 Tab 3    escitalopram oxalate (LEXAPRO) 20 mg tablet TAKE 1 TABLET BY MOUTH DAILY 90 Tab 3    estrogens, conjugated, (PREMARIN) 0.45 mg tablet Premarin 0.45 mg tablet   1 Tab PO Daily for 3 months.  dorzolamide-timolol (COSOPT) 22.3-6.8 mg/mL ophthalmic solution Cosopt 22.3 mg-6.8 mg/mL eye drops   Prescribed by non 606/706 Marion Del Rosario MD      Lactobacillus acidophilus (PROBIOTIC PO) Take  by mouth.  clobetasol (TEMOVATE) 0.05 % ointment two (2) times daily as needed.  dicyclomine (BENTYL) 10 mg capsule TAKE 1 CAPSULE BY MOUTH 4 TIMES A DAY (Patient taking differently: TAKE 1 CAPSULE BY MOUTH 4 TIMES A DAY prn) 120 Cap 3    PREMARIN 0.625 mg tablet TAKE 1 TABLET BY MOUTH EVERY DAY (Patient taking differently: Take 0.3 mg by mouth daily.) 90 Tab 2    travoprost (TRAVATAN Z) 0.004 % ophthalmic solution Administer 1 Drop to both eyes nightly.  MAGNESIUM PO Take 500 mg by mouth daily.  lysine (L-LYSINE) 500 mg cap Take 500 mg by mouth daily as needed (cold sores).  atorvastatin (LIPITOR) 40 mg tablet TAKE 1 TABLET BY MOUTH DAILY 90 Tab 3    naproxen sodium (ALEVE) 220 mg tablet Take 220 mg by mouth two (2) times daily (with meals).  minocycline (MINOCIN, DYNACIN) 100 mg capsule Take 100 mg by mouth daily.  CETIRIZINE HCL (ZYRTEC PO) Take 10 mg by mouth daily.        Allergies   Allergen Reactions    Cephalosporins Shortness of Breath    Codeine Nausea and Vomiting    Pcn [Penicillins] Rash       Family History   Problem Relation Age of Onset    Heart Disease Mother     Other Mother         kidney stones    Diabetes Father     Cancer Sister         colon cancer    Heart Disease Brother     Diabetes Brother     Other Brother         kidney stones    Colon Cancer Brother     Heart Disease Brother     Other Brother         kidney stones    Heart Disease Brother     Heart Disease Brother     Cancer Brother melanoma     Social History     Tobacco Use    Smoking status: Former Smoker     Last attempt to quit: 1979     Years since quittin.4    Smokeless tobacco: Never Used   Substance Use Topics    Alcohol use: Yes     Alcohol/week: 7.0 standard drinks     Types: 7 Glasses of wine per week       Depression Risk Factor Screening:     3 most recent PHQ Screens 6/10/2020   Little interest or pleasure in doing things Not at all   Feeling down, depressed, irritable, or hopeless Not at all   Total Score PHQ 2 0       Alcohol Risk Factor Screening:   Do you average 1 drink per night or more than 7 drinks a week: Yes    On any one occasion in the past three months have you have had more than 3 drinks containing alcohol:  No      Functional Ability and Level of Safety:   Hearing: Wears hearing aids. Activities of Daily Living: The home contains: handrails and grab bars  Patient does total self care    Ambulation: with no difficulty    Fall Risk:  Fall Risk Assessment, last 12 mths 6/10/2020   Able to walk? Yes   Fall in past 12 months? No       Abuse Screen:  Patient is not abused    Cognitive Screening   Has your family/caregiver stated any concerns about your memory: no  Cognitive Screening: Normal - MMSE (Mini Mental Status Exam)    Patient Care Team   Patient Care Team:  Sarah Gill NP as PCP - General (Nurse Practitioner)  Sarah Gill NP as PCP - INTERNAL MEDICINE (Nurse Practitioner)  Sarah Gill NP as PCP - [facility] Empaneled Provider    Assessment/Plan   Education and counseling provided:  Are appropriate based on today's review and evaluation    Diagnoses and all orders for this visit:    1. Acquired hypothyroidism  -     TSH 3RD GENERATION  -     T4, FREE    2. Fatigue, unspecified type    3. Dyslipidemia  -     LIPID PANEL    4. Welcome to Medicare preventive visit    5.  Essential hypertension  -     CBC WITH AUTOMATED DIFF  -     METABOLIC PANEL, COMPREHENSIVE  -     URINALYSIS W/O MICRO    6. Hyperglycemia  -     HEMOGLOBIN A1C W/O EAG    7. Vitamin D deficiency  -     VITAMIN D, 25 HYDROXY    Subjective:  Ms. Krystian Chambers is a pleasant 22-year-old lady, who comes in today for her updated history and physical, as well as Medicare wellness. She has no complaints. Past Medical History/Surgeries:    1. Status post abdominal hysterectomy and bilateral salpingo-oophorectomy in  secondary to endometrial carcinoma. She is still seeing Dr. Sadia Marina at [facility] every two years. 2. Status post colonoscopic polypectomy with a family history of colon cancer. Her last colonoscopy was in 2019.  3. Status post bilateral cataract extractions. 4. Tonsillectomy. 5. Appendectomy. 6.  x two.  7. Lumbar laminotomy with a redo a year later. Illnesses:  1. GERD. 2. IBS. 3. Glaucoma. 4. Chronic anxiety/depression. 5. Hypothyroidism. 6. Psoriasis and seborrheic dermatitis, followed by Dr. Yunior Pope. Family History:  Father  at 80 of an MI. Mother  at 68 of complications during placement of a pacemaker. All of her siblings are . Social History:  She is . She is a retired nurse. She has three children living and well. She does have a glass of wine daily. She quit smoking at age 34. She is very active. Allergies:  Penicillin and Cephalosporin that caused a rash, codeine has caused nausea and sulfa has caused itching. Medications:  1. Clobetasol 0.05% ointment twice daily as needed. 2. Cyclobenzaprine 10 mg, one tablet three times a day as needed for muscle spasm. 3. Dicyclomine 10 mg four times a day as needed. 4. Lomotil 2.5-0.025 mg four times a day as needed for diarrhea. 5. Cosopt 22.3-6.8 mg per mL ophthalmic solution daily. 6. Lexapro 20 mg daily. 7. Premarin 0.45 mg, one tablet daily. 8. Folic acid 1 mg daily. 9. Probiotic daily. 10. Levothyroxine 88 mcg daily.   11. Claritin daily.  12. Lorazepam 0.5 mg nightly. 13. Visine 500 mg tablets daily. 14. Magnesium 500 mg daily. 15. Methotrexate 2.5 mg, two tablets weekly. 16. Protonix 40 mg daily. 17. Travoprost 0.004% ophthalmic solution, one drop in both eyes nightly. Review of Systems:  HEENT:  Denies any headaches, dizziness or blurred vision. She is up to date with her eye exam.  CVR:  Denies any chest pain or palpitations. Denies any syncopal episode. She denies any shortness of breath, cough, wheezing, PND or orthopnea. Denies any ankle edema. GI:  Appetite is good, weight is stable. She has done extremely well since she has been on Protonix and has had no further trouble with her reflux. Stools are normal without presence of blood or melena. :  Denies any urinary symptoms. GYN:  As noted previously, she does see Dr. Roni Interiano at [facility] every two years. Physical Examination:  GENERAL:  Pleasant lady in no acute distress. She is alert and oriented. She answers my questions appropriately. VITALS:  Blood Pressure:  118/72. Pulse:  60.  Temperature: 97.5. O2 sat:  97.  Weight:  124 pounds. HEENT:  Normocephalic, atraumatic. Status post bilateral cataract extraction. EOMI. TMs normal.  Mouth mucosa pink. Tongue midline. Pharynx normal.  NECK:  Supple without adenopathy, thyromegaly or carotid bruits. CHEST:  Lungs clear to auscultation, no rales or wheezes. CV:  Heart regular rhythm without murmur or gallop. BREASTS:  Symmetrical without mass or nipple discharge. No axillary, infra or supraclavicular adenopathy noted. ABDOMEN:  Bowel sounds present in four quadrants, soft, non tender, no organomegaly or masses. EXTREMITIES:  No edema or calf tenderness. Distal pulses were present. NEUROLOGIC:  Cranial nerves II-XII intact. Excellent strength in the upper and lower extremities against resistance. Sensation is preserved. Romberg is negative.   Reflexes 1+ and symmetrical.    Impression:  1. GERD. 2. IBS. 3. Glaucoma. 4. Chronic anxiety/depression. 5. Hypothyroidism. 6. Psoriasis and seborrheic dermatitis. Plan:  1. She was fasting this morning, so it was opted to do all of her lab studies. 2. Otherwise she will continue with her current regimen and I will continue to see her every six months. 3. She will continue with a prudent diet, exercise and weight management to keep BMI at acceptable level. 4. She is up to date with her health maintenance.         Health Maintenance Due   Topic Date Due    Shingrix Vaccine Age 49> (2 of 2) 07/11/2019    Medicare Yearly Exam  08/31/2019

## 2020-06-10 NOTE — PROGRESS NOTES
Armin Connor is a 78 y.o. female     Chief Complaint   Patient presents with   [facility] Annual Wellness Visit     medicare wellness wisit    Thyroid Problem     6 month follow up    Cholesterol Problem     6 month follow up       Visit Vitals  /72 (BP 1 Location: Left arm, BP Patient Position: Sitting)   Pulse 60   Temp 97.5 °F (36.4 °C) (Oral)   Resp 16   Ht 4' 11\" (1.499 m)   Wt 124 lb 1.6 oz (56.3 kg)   SpO2 97%   BMI 25.07 kg/m²       Health Maintenance Due   Topic Date Due    Shingrix Vaccine Age 49> (2 of 2) 07/11/2019    Medicare Yearly Exam  08/31/2019       1. Have you been to the ER, urgent care clinic since your last visit? Hospitalized since your last visit? No    2. Have you seen or consulted any other health care providers outside of the [address] [place] [address] since your last visit? Include any pap smears or colon screening.  No

## 2020-06-12 LAB — BACTERIA UR CULT: ABNORMAL

## 2020-06-17 ENCOUNTER — TELEPHONE (OUTPATIENT)
Dept: INTERNAL MEDICINE CLINIC | Age: 80
End: 2020-06-17

## 2020-06-17 RX ORDER — DOXYCYCLINE 100 MG/1
100 TABLET ORAL 2 TIMES DAILY
Qty: 14 TAB | Refills: 0 | Status: SHIPPED | OUTPATIENT
Start: 2020-06-17 | End: 2021-08-11

## 2020-06-17 NOTE — PROGRESS NOTES
Lab results discussed with patient. She has UTI. Start Doxycycline 100 mg twice daily x 7 days. Follow up for repeat culture and U/A post treatment.

## 2020-06-24 ENCOUNTER — APPOINTMENT (OUTPATIENT)
Dept: INTERNAL MEDICINE CLINIC | Age: 80
End: 2020-06-24

## 2020-06-24 DIAGNOSIS — R31.9 URINARY TRACT INFECTION WITH HEMATURIA, SITE UNSPECIFIED: ICD-10-CM

## 2020-06-24 DIAGNOSIS — N39.0 URINARY TRACT INFECTION WITH HEMATURIA, SITE UNSPECIFIED: ICD-10-CM

## 2020-06-24 LAB
BACTERIA,BACTU: ABNORMAL
BILIRUB UR QL: NEGATIVE
CLARITY: CLEAR
COLOR UR: ABNORMAL
GLUCOSE 24H UR-MRATE: NEGATIVE G/(24.H)
HGB UR QL STRIP: NEGATIVE
KETONES UR QL STRIP.AUTO: NEGATIVE
LEUKOCYTE ESTERASE: ABNORMAL
NITRITE UR QL STRIP.AUTO: NEGATIVE
PH UR STRIP: 6.5 [PH] (ref 5–7)
PROT UR STRIP-MCNC: ABNORMAL MG/DL
RBC #/AREA URNS HPF: ABNORMAL #/HPF
SP GR UR REFRACTOMETRY: 1.01 (ref 1–1.03)
SQUAMOUS EPITHELIAL CELLS: ABNORMAL
UROBILINOGEN UR QL STRIP.AUTO: NEGATIVE
WBC URNS QL MICRO: ABNORMAL #/HPF

## 2020-06-27 LAB — BACTERIA UR CULT: ABNORMAL

## 2020-06-30 NOTE — PROGRESS NOTES
Result of culture discussed with patient. She is asymptomatic. She is growing less then 50,000 so we will differ treatment at this time. She already has appointment to see Dr Elvis Ovalles urologist.

## 2020-07-06 RX ORDER — ESCITALOPRAM OXALATE 20 MG/1
TABLET ORAL
Qty: 90 TAB | Refills: 3 | Status: SHIPPED | OUTPATIENT
Start: 2020-07-06 | End: 2021-07-09

## 2020-08-04 ENCOUNTER — OFFICE VISIT (OUTPATIENT)
Dept: INTERNAL MEDICINE CLINIC | Age: 80
End: 2020-08-04
Payer: MEDICARE

## 2020-08-04 VITALS
HEART RATE: 60 BPM | DIASTOLIC BLOOD PRESSURE: 70 MMHG | WEIGHT: 125.9 LBS | TEMPERATURE: 97.6 F | OXYGEN SATURATION: 97 % | HEIGHT: 59 IN | BODY MASS INDEX: 25.38 KG/M2 | RESPIRATION RATE: 16 BRPM | SYSTOLIC BLOOD PRESSURE: 120 MMHG

## 2020-08-04 DIAGNOSIS — R19.7 DIARRHEA, UNSPECIFIED TYPE: Primary | ICD-10-CM

## 2020-08-04 DIAGNOSIS — F41.9 ANXIETY: ICD-10-CM

## 2020-08-04 DIAGNOSIS — K58.0 IRRITABLE BOWEL SYNDROME WITH DIARRHEA: ICD-10-CM

## 2020-08-04 LAB
A-G RATIO,AGRAT: 1.5 RATIO
ALBUMIN SERPL-MCNC: 3.7 G/DL (ref 3.9–5.4)
ALP SERPL-CCNC: 61 U/L (ref 38–126)
ALT SERPL-CCNC: 23 U/L (ref 0–35)
ANION GAP SERPL CALC-SCNC: 7 MMOL/L
AST SERPL W P-5'-P-CCNC: 29 U/L (ref 14–36)
BILIRUB SERPL-MCNC: 0.4 MG/DL (ref 0.2–1.3)
BUN SERPL-MCNC: 20 MG/DL (ref 7–17)
BUN/CREATININE RATIO,BUCR: 22 RATIO
CALCIUM SERPL-MCNC: 10 MG/DL (ref 8.4–10.2)
CHLORIDE SERPL-SCNC: 105 MMOL/L (ref 98–107)
CO2 SERPL-SCNC: 28 MMOL/L (ref 22–32)
CREAT SERPL-MCNC: 0.9 MG/DL (ref 0.7–1.2)
ERYTHROCYTE [DISTWIDTH] IN BLOOD BY AUTOMATED COUNT: 14.5 %
GLOBULIN,GLOB: 2.5
GLUCOSE SERPL-MCNC: 70 MG/DL (ref 65–105)
HCT VFR BLD AUTO: 40.6 % (ref 37–51)
HGB BLD-MCNC: 13.3 G/DL (ref 12–18)
LYMPHOCYTES ABSOLUTE: 1.2 K/UL (ref 0.6–4.1)
LYMPHOCYTES NFR BLD: 25.9 % (ref 10–58.5)
MCH RBC QN AUTO: 32.1 PG (ref 26–32)
MCHC RBC AUTO-ENTMCNC: 32.8 G/DL (ref 30–36)
MCV RBC AUTO: 98.1 FL (ref 80–97)
MONOCYTES ABS-DIF,2141: 0.4 K/UL (ref 0–1.8)
MONOCYTES NFR BLD: 8.6 % (ref 0.1–24)
NEUTROPHILS # BLD: 65.5 % (ref 37–92)
NEUTROPHILS ABS,2156: 3 K/UL (ref 2–7.8)
PLATELET # BLD AUTO: 246 K/UL (ref 140–440)
POTASSIUM SERPL-SCNC: 5.1 MMOL/L (ref 3.6–5)
PROT SERPL-MCNC: 6.2 G/DL (ref 6.3–8.2)
RBC # BLD AUTO: 4.14 M/UL (ref 4.2–6.3)
SODIUM SERPL-SCNC: 140 MMOL/L (ref 137–145)
WBC # BLD AUTO: 4.6 K/UL (ref 4.1–10.9)

## 2020-08-04 PROCEDURE — 99214 OFFICE O/P EST MOD 30 MIN: CPT | Performed by: NURSE PRACTITIONER

## 2020-08-04 PROCEDURE — 80053 COMPREHEN METABOLIC PANEL: CPT | Performed by: NURSE PRACTITIONER

## 2020-08-04 PROCEDURE — G8427 DOCREV CUR MEDS BY ELIG CLIN: HCPCS | Performed by: NURSE PRACTITIONER

## 2020-08-04 PROCEDURE — G8399 PT W/DXA RESULTS DOCUMENT: HCPCS | Performed by: NURSE PRACTITIONER

## 2020-08-04 PROCEDURE — G8536 NO DOC ELDER MAL SCRN: HCPCS | Performed by: NURSE PRACTITIONER

## 2020-08-04 PROCEDURE — G8419 CALC BMI OUT NRM PARAM NOF/U: HCPCS | Performed by: NURSE PRACTITIONER

## 2020-08-04 PROCEDURE — G8752 SYS BP LESS 140: HCPCS | Performed by: NURSE PRACTITIONER

## 2020-08-04 PROCEDURE — 85025 COMPLETE CBC W/AUTO DIFF WBC: CPT | Performed by: NURSE PRACTITIONER

## 2020-08-04 PROCEDURE — G8432 DEP SCR NOT DOC, RNG: HCPCS | Performed by: NURSE PRACTITIONER

## 2020-08-04 PROCEDURE — G8754 DIAS BP LESS 90: HCPCS | Performed by: NURSE PRACTITIONER

## 2020-08-04 PROCEDURE — 1090F PRES/ABSN URINE INCON ASSESS: CPT | Performed by: NURSE PRACTITIONER

## 2020-08-04 PROCEDURE — 82270 OCCULT BLOOD FECES: CPT | Performed by: NURSE PRACTITIONER

## 2020-08-04 PROCEDURE — 1101F PT FALLS ASSESS-DOCD LE1/YR: CPT | Performed by: NURSE PRACTITIONER

## 2020-08-04 NOTE — PROGRESS NOTES
Past Medical History:   Diagnosis Date    Acute recurrent pansinusitis 2/23/2018    Annual physical exam 8/3/2017    Anxiety 8/3/2017    Arthralgia 8/3/2017    Arthritis     Arthritis of right hip 8/3/2017    Arthritis, hip 8/3/2017    Autoimmune disease ([address]     psoriasis    Avascular necrosis of femoral head ([facility] [address] left 8/3/2017    Bone loss 8/3/2017    Cancer (HCC)     endometrial    Chronic eczematous otitis externa of both ears 8/24/2017    Contusion of left knee, initial encounter 8/3/2017    Degenerative arthritis of lumbar spine 8/3/2017    Depression     Elevated liver function tests 8/3/2017    MIAN (generalized anxiety disorder) 8/3/2017    GERD (gastroesophageal reflux disease)     Glaucoma     Glaucoma 8/3/2017    Headache, acute 8/3/2017    Herpes zoster without complication 23/83/2028    History of colonoscopy with polypectomy 8/3/2017    History of endometrial cancer 8/3/2017    Hyperlipidemia 8/3/2017    Hypothyroid 8/3/2017    IBS (irritable bowel syndrome) 8/3/2017    AMPARO (iron deficiency anemia) 8/3/2017    Insomnia 8/3/2017    Labral tear of hip, degenerative 10/24/2017    Long-term use of immunosuppressant medication 8/3/2017    Medication side effect 8/3/2017    Melena 8/3/2017    Monilial vaginitis 8/24/2017    Muscle spasm 8/3/2017    Osteoporosis 8/3/2017    Other ill-defined conditions(799.89)     high cholesterol    Other ill-defined conditions(799.89)     psoriasis    Post-menopausal 8/3/2017    Psoriasis 8/3/2017    Pyuria, sterile 8/3/2017    Rash 8/3/2017    Rosacea blepharoconjunctivitis 8/3/2017    Sciatica 8/3/2017    Somatic dysfunction of cervical region 8/3/2017    Spondylosis of cervical region without myelopathy or radiculopathy 8/3/2017    Thrush 8/3/2017    Thyroid disease     Urticaria 8/3/2017     Past Surgical History:   Procedure Laterality Date    COLONOSCOPY N/A 6/12/2019    COLONOSCOPY performed by Hilario Clarke German Heard MD at [facility] ENDOSCOPY    COLONOSCOPY,DIAGNOSTIC  6/12/2019         COLORECTAL SCRN; HI RISK IND  4/2/2014         HX APPENDECTOMY      HX GYN      hysterectomy    HX HEENT      bilateral ear    HX HEENT      LASIK    HX ORTHOPAEDIC  1-2011    lumbar laminectomy    HX OTHER SURGICAL Bilateral     cataract extraction with lens implant    HX TONSILLECTOMY      NEUROLOGICAL PROCEDURE UNLISTED  2011    spinal fusion       Current Outpatient Medications on File Prior to Visit   Medication Sig Dispense Refill    escitalopram oxalate (LEXAPRO) 20 mg tablet TAKE 1 TABLET BY MOUTH DAILY 90 Tab 3    doxycycline (ADOXA) 100 mg tablet Take 1 Tab by mouth two (2) times a day. 14 Tab 0    folic acid (FOLVITE) 1 mg tablet TAKE 2 TABLETS BY MOUTH DAILY 180 Tab 2    pantoprazole (PROTONIX) 40 mg tablet Take 1 Tab by mouth daily. 90 Tab 3    loratadine (CLARITIN PO) Take  by mouth.  methotrexate (RHEUMATREX) 2.5 mg tablet 2 (TWO) TABLET, ORAL, WEEKLY 20 Tab 10    diphenoxylate-atropine (LOMOTIL) 2.5-0.025 mg per tablet Take 1 Tab by mouth four (4) times daily as needed for Diarrhea. Max Daily Amount: 4 Tabs. 40 Tab 0    cyclobenzaprine (FLEXERIL) 10 mg tablet Take 1 Tab by mouth three (3) times daily as needed for Muscle Spasm(s) (neck pain). 60 Tab 0    LORazepam (ATIVAN) 0.5 mg tablet Take 1 Tab by mouth nightly as needed (sleep). Max Daily Amount: 0.5 mg. 30 Tab 0    levothyroxine (SYNTHROID) 88 mcg tablet TAKE 1 TABLET BY MOUTH DAILY 90 Tab 3    atorvastatin (LIPITOR) 40 mg tablet TAKE 1 TABLET BY MOUTH DAILY 90 Tab 3    estrogens, conjugated, (PREMARIN) 0.45 mg tablet Premarin 0.45 mg tablet   1 Tab PO Daily for 3 months.  dorzolamide-timolol (COSOPT) 22.3-6.8 mg/mL ophthalmic solution Cosopt 22.3 mg-6.8 mg/mL eye drops   Prescribed by [facility] MD      Lactobacillus acidophilus (PROBIOTIC PO) Take  by mouth.  clobetasol (TEMOVATE) 0.05 % ointment two (2) times daily as needed.  dicyclomine (BENTYL) 10 mg capsule TAKE 1 CAPSULE BY MOUTH 4 TIMES A DAY (Patient taking differently: TAKE 1 CAPSULE BY MOUTH 4 TIMES A DAY prn) 120 Cap 3    PREMARIN 0.625 mg tablet TAKE 1 TABLET BY MOUTH EVERY DAY (Patient taking differently: Take 0.3 mg by mouth daily.) 90 Tab 2    naproxen sodium (ALEVE) 220 mg tablet Take 220 mg by mouth two (2) times daily (with meals).  travoprost (TRAVATAN Z) 0.004 % ophthalmic solution Administer 1 Drop to both eyes nightly.  MAGNESIUM PO Take 500 mg by mouth daily.  lysine (L-LYSINE) 500 mg cap Take 500 mg by mouth daily as needed (cold sores).  CETIRIZINE HCL (ZYRTEC PO) Take 10 mg by mouth daily.  minocycline (MINOCIN, DYNACIN) 100 mg capsule Take 100 mg by mouth daily. No current facility-administered medications on file prior to visit.       Allergies   Allergen Reactions    Cephalosporins Shortness of Breath    Codeine Nausea and Vomiting    Pcn [Penicillins] Rash

## 2020-08-04 NOTE — PROGRESS NOTES
Dede Liu is a 78 y.o. female     Chief Complaint   Patient presents with    Diarrhea     Started the end of July       Visit Vitals  /70 (BP 1 Location: Left arm, BP Patient Position: Sitting)   Pulse 60   Temp 97.6 °F (36.4 °C) (Oral)   Resp 16   Ht 4' 11\" (1.499 m)   Wt 125 lb 14.4 oz (57.1 kg)   SpO2 97%   BMI 25.43 kg/m²       Health Maintenance Due   Topic Date Due    Shingrix Vaccine Age 49> (2 of 2) 07/11/2019    Influenza Age 5 to Adult  08/01/2020       1. Have you been to the ER, urgent care clinic since your last visit? Hospitalized since your last visit? No    2. Have you seen or consulted any other health care providers outside of the [address] [place] [address] since your last visit? Include any pap smears or colon screening.  No

## 2020-08-05 NOTE — PROGRESS NOTES
HISTORY:  Ms. Adry Mandujano is a pleasant 38-year-old lady who comes in today for evaluation of a 1-week history of frequent diarrhea. She does have a history of IBS for which she has been managed in the past on dicyclomine and Lomotil. She initially had 2 or 3 loose stools a day, but since she has restarted the Lomotil, which she takes 1 tablet in the morning and 2 of her dicyclomine, she has had some formed stools. She denies presence of blood in her stools or melena. She denies any abdominal pain. She denies any chills or fever. This is not associated with any nausea or vomiting. Denies any weight loss. She did have a colonoscopy last year because of her family history of colon cancer. This was negative and Dr. Joline Cockayne recommendation that she did not need any further colonoscopy unless there was a concern. Further discussion with her revealed that for the past month or so she has had a fair amount of turmoil within her family and this had gotten her very upset. She wonders if the stress that she is ongoing could have had an effect on her IBS.       Past Medical History:   Diagnosis Date    Acute recurrent pansinusitis 2/23/2018    Annual physical exam 8/3/2017    Anxiety 8/3/2017    Arthralgia 8/3/2017    Arthritis     Arthritis of right hip 8/3/2017    Arthritis, hip 8/3/2017    Autoimmune disease ([address]     psoriasis    Avascular necrosis of femoral head ([address] left 8/3/2017    Bone loss 8/3/2017    Cancer (HCC)     endometrial    Chronic eczematous otitis externa of both ears 8/24/2017    Contusion of left knee, initial encounter 8/3/2017    Degenerative arthritis of lumbar spine 8/3/2017    Depression     Elevated liver function tests 8/3/2017    MIAN (generalized anxiety disorder) 8/3/2017    GERD (gastroesophageal reflux disease)     Glaucoma     Glaucoma 8/3/2017    Headache, acute 8/3/2017    Herpes zoster without complication 58/09/4179    History of colonoscopy with polypectomy 8/3/2017    History of endometrial cancer 8/3/2017    Hyperlipidemia 8/3/2017    Hypothyroid 8/3/2017    IBS (irritable bowel syndrome) 8/3/2017    AMPARO (iron deficiency anemia) 8/3/2017    Insomnia 8/3/2017    Labral tear of hip, degenerative 10/24/2017    Long-term use of immunosuppressant medication 8/3/2017    Medication side effect 8/3/2017    Melena 8/3/2017    Monilial vaginitis 8/24/2017    Muscle spasm 8/3/2017    Osteoporosis 8/3/2017    Other ill-defined conditions(799.89)     high cholesterol    Other ill-defined conditions(799.89)     psoriasis    Post-menopausal 8/3/2017    Psoriasis 8/3/2017    Pyuria, sterile 8/3/2017    Rash 8/3/2017    Rosacea blepharoconjunctivitis 8/3/2017    Sciatica 8/3/2017    Somatic dysfunction of cervical region 8/3/2017    Spondylosis of cervical region without myelopathy or radiculopathy 8/3/2017    Thrush 8/3/2017    Thyroid disease     Urticaria 8/3/2017     Past Surgical History:   Procedure Laterality Date    COLONOSCOPY N/A 6/12/2019    COLONOSCOPY performed by Kamila Cerda MD at [facility]  6/12/2019         COLORECTAL SCRN; HI RISK IND  4/2/2014         HX APPENDECTOMY      HX GYN      hysterectomy    HX HEENT      bilateral ear    HX HEENT      LASIK    HX ORTHOPAEDIC  1-2011    lumbar laminectomy    HX OTHER SURGICAL Bilateral     cataract extraction with lens implant    HX TONSILLECTOMY      NEUROLOGICAL PROCEDURE UNLISTED  2011    spinal fusion       Current Outpatient Medications on File Prior to Visit   Medication Sig Dispense Refill    escitalopram oxalate (LEXAPRO) 20 mg tablet TAKE 1 TABLET BY MOUTH DAILY 90 Tab 3    doxycycline (ADOXA) 100 mg tablet Take 1 Tab by mouth two (2) times a day. 14 Tab 0    folic acid (FOLVITE) 1 mg tablet TAKE 2 TABLETS BY MOUTH DAILY 180 Tab 2    pantoprazole (PROTONIX) 40 mg tablet Take 1 Tab by mouth daily.  90 Tab 3    loratadine (CLARITIN PO) Take  by mouth.  methotrexate (RHEUMATREX) 2.5 mg tablet 2 (TWO) TABLET, ORAL, WEEKLY 20 Tab 10    diphenoxylate-atropine (LOMOTIL) 2.5-0.025 mg per tablet Take 1 Tab by mouth four (4) times daily as needed for Diarrhea. Max Daily Amount: 4 Tabs. 40 Tab 0    cyclobenzaprine (FLEXERIL) 10 mg tablet Take 1 Tab by mouth three (3) times daily as needed for Muscle Spasm(s) (neck pain). 60 Tab 0    LORazepam (ATIVAN) 0.5 mg tablet Take 1 Tab by mouth nightly as needed (sleep). Max Daily Amount: 0.5 mg. 30 Tab 0    levothyroxine (SYNTHROID) 88 mcg tablet TAKE 1 TABLET BY MOUTH DAILY 90 Tab 3    atorvastatin (LIPITOR) 40 mg tablet TAKE 1 TABLET BY MOUTH DAILY 90 Tab 3    estrogens, conjugated, (PREMARIN) 0.45 mg tablet Premarin 0.45 mg tablet   1 Tab PO Daily for 3 months.  dorzolamide-timolol (COSOPT) 22.3-6.8 mg/mL ophthalmic solution Cosopt 22.3 mg-6.8 mg/mL eye drops   Prescribed by non 606/706 Marion Del Rosario MD      Lactobacillus acidophilus (PROBIOTIC PO) Take  by mouth.  clobetasol (TEMOVATE) 0.05 % ointment two (2) times daily as needed.  dicyclomine (BENTYL) 10 mg capsule TAKE 1 CAPSULE BY MOUTH 4 TIMES A DAY (Patient taking differently: TAKE 1 CAPSULE BY MOUTH 4 TIMES A DAY prn) 120 Cap 3    PREMARIN 0.625 mg tablet TAKE 1 TABLET BY MOUTH EVERY DAY (Patient taking differently: Take 0.3 mg by mouth daily.) 90 Tab 2    naproxen sodium (ALEVE) 220 mg tablet Take 220 mg by mouth two (2) times daily (with meals).  travoprost (TRAVATAN Z) 0.004 % ophthalmic solution Administer 1 Drop to both eyes nightly.  MAGNESIUM PO Take 500 mg by mouth daily.  lysine (L-LYSINE) 500 mg cap Take 500 mg by mouth daily as needed (cold sores).  CETIRIZINE HCL (ZYRTEC PO) Take 10 mg by mouth daily.  minocycline (MINOCIN, DYNACIN) 100 mg capsule Take 100 mg by mouth daily. No current facility-administered medications on file prior to visit.       Allergies   Allergen Reactions    Cephalosporins Shortness of Breath    Codeine Nausea and Vomiting    Pcn [Penicillins] Rash   PHYSICAL EXAMINATION:  GENERAL:  She is a pleasant, thin lady in no acute distress. She is alert and oriented. She answers my questions appropriately. VITAL SIGNS:  Her blood pressure is 120/70, pulse 60, respirations 16, temperature 97.6, O2 saturations 97%. Her weight is stable at 125 pounds. HEENT:  Normocephalic, atraumatic. NECK:  Supple without adenopathy. CHEST:  Lungs were clear to auscultation. No rales or wheezes. CARDIOVASCULAR:  Heart regular rhythm without murmur or gallop. ABDOMEN:  Bowel sounds present in 4 quadrants. Soft, nontender. No organomegaly or palpable masses. No CVA tenderness. EXTREMITIES:  No edema or calf tenderness. Distal pulses were present. IMPRESSION:    1. Diarrhea. 2. Irritable bowel syndrome (IBS). PLAN:    1. It was opted to get some basic lab studies as well as some stool cultures. 2. I have asked her to increase her dicyclomine to q.i.d. and continue with her Lomotil p.r.n.  I suspect this is more exacerbation of her IBS. She certainly will call us should she fail to improve. Further plans will be established pending results of her lab studies.

## 2020-08-07 LAB
OCCULT BLOOD -1: NEGATIVE
OCCULT BLOOD -2: NEGATIVE
OCCULT BLOOD -3: NEGATIVE

## 2020-08-10 LAB
C DIFF TOX A+B STL QL IA: NEGATIVE
CAMPYLOBACTER STL CULT: NORMAL
E COLI SXT STL QL IA: NEGATIVE
O+P SPEC MICRO: NORMAL
SALM + SHIG STL CULT: NORMAL

## 2020-08-11 NOTE — PROGRESS NOTES
Lab results discussed with patient. She is feeling much better and has had no diarrhea since she has increased her dicyclomine to 4 times a day. Continue current regimen. Follow up if any concerns.

## 2020-08-21 RX ORDER — DICYCLOMINE HYDROCHLORIDE 10 MG/1
CAPSULE ORAL
Qty: 120 CAP | Refills: 3 | Status: SHIPPED | OUTPATIENT
Start: 2020-08-21

## 2020-08-21 NOTE — TELEPHONE ENCOUNTER
PCP: Carley Ibarra NP    Last appt: 8/4/2020  No future appointments.     Requested Prescriptions     Pending Prescriptions Disp Refills    dicyclomine (BENTYL) 10 mg capsule 120 Cap 3     Sig: TAKE 1 CAPSULE BY MOUTH 4 TIMES A DAY       Prior labs and Blood pressures:  BP Readings from Last 3 Encounters:   08/04/20 120/70   06/10/20 118/72   11/09/19 123/69     Lab Results   Component Value Date/Time    Sodium 140 08/04/2020 10:09 AM    Potassium 5.1 (H) 08/04/2020 10:09 AM    Chloride 105 08/04/2020 10:09 AM    CO2 28.0 08/04/2020 10:09 AM    Anion gap 7 08/04/2020 10:09 AM    Glucose 70 08/04/2020 10:09 AM    BUN 20.0 (H) 08/04/2020 10:09 AM    Creatinine 0.9 08/04/2020 10:09 AM    BUN/Creatinine ratio 22 08/04/2020 10:09 AM    GFR est AA >60 08/04/2020 10:09 AM    GFR est non-AA 60 (H) 08/04/2020 10:09 AM    Calcium 10.0 08/04/2020 10:09 AM     Lab Results   Component Value Date/Time    Hemoglobin A1c 4.7 06/10/2020 10:54 AM     Lab Results   Component Value Date/Time    Cholesterol, total 208 (H) 06/10/2020 10:54 AM    Cholesterol (POC) 175.0 08/23/2018 08:20 AM    HDL Cholesterol 104 06/10/2020 10:54 AM    HDL Cholesterol (POC) 108.0 08/23/2018 08:20 AM    LDL Cholesterol (POC) 46.8 08/23/2018 08:20 AM    LDL, calculated 75 06/10/2020 10:54 AM    VLDL 29 06/10/2020 10:54 AM    Triglyceride 146 06/10/2020 10:54 AM    Triglycerides (POC) 101.0 08/23/2018 08:20 AM    CHOL/HDL Ratio 2 06/10/2020 10:54 AM     Lab Results   Component Value Date/Time    VITAMIN D, 25-HYDROXY 48 06/10/2020 10:54 AM       Lab Results   Component Value Date/Time    TSH, 3rd generation 0.96 06/10/2020 10:54 AM

## 2020-08-21 NOTE — TELEPHONE ENCOUNTER
Last Refill: 1/15/2018  Last Visit: 8/4/2020   Next Visit: none    Requested Prescriptions     Pending Prescriptions Disp Refills    dicyclomine (BENTYL) 10 mg capsule 120 Cap 3

## 2020-09-14 RX ORDER — LEVOTHYROXINE SODIUM 88 UG/1
TABLET ORAL
Qty: 90 TAB | Refills: 3 | Status: SHIPPED | OUTPATIENT
Start: 2020-09-14 | End: 2021-07-09

## 2020-09-14 NOTE — TELEPHONE ENCOUNTER
PCP: Zachary Alba NP    Last appt: 8/4/2020    No future appointments.     Requested Prescriptions     Pending Prescriptions Disp Refills    levothyroxine (SYNTHROID) 88 mcg tablet [Pharmacy Med Name: LEVOTHYROXINE 0.088MG (88MCG) TAB] 90 Tab 3     Sig: TAKE 1 TABLET BY MOUTH DAILY       Prior labs and Blood pressures:  BP Readings from Last 3 Encounters:   08/04/20 120/70   06/10/20 118/72   11/09/19 123/69     Lab Results   Component Value Date/Time    Sodium 140 08/04/2020 10:09 AM    Potassium 5.1 (H) 08/04/2020 10:09 AM    Chloride 105 08/04/2020 10:09 AM    CO2 28.0 08/04/2020 10:09 AM    Anion gap 7 08/04/2020 10:09 AM    Glucose 70 08/04/2020 10:09 AM    BUN 20.0 (H) 08/04/2020 10:09 AM    Creatinine 0.9 08/04/2020 10:09 AM    BUN/Creatinine ratio 22 08/04/2020 10:09 AM    GFR est AA >60 08/04/2020 10:09 AM    GFR est non-AA 60 (H) 08/04/2020 10:09 AM    Calcium 10.0 08/04/2020 10:09 AM     Lab Results   Component Value Date/Time    Hemoglobin A1c 4.7 06/10/2020 10:54 AM     Lab Results   Component Value Date/Time    Cholesterol, total 208 (H) 06/10/2020 10:54 AM    Cholesterol (POC) 175.0 08/23/2018 08:20 AM    HDL Cholesterol 104 06/10/2020 10:54 AM    HDL Cholesterol (POC) 108.0 08/23/2018 08:20 AM    LDL Cholesterol (POC) 46.8 08/23/2018 08:20 AM    LDL, calculated 75 06/10/2020 10:54 AM    VLDL 29 06/10/2020 10:54 AM    Triglyceride 146 06/10/2020 10:54 AM    Triglycerides (POC) 101.0 08/23/2018 08:20 AM    CHOL/HDL Ratio 2 06/10/2020 10:54 AM     Lab Results   Component Value Date/Time    VITAMIN D, 25-HYDROXY 48 06/10/2020 10:54 AM       Lab Results   Component Value Date/Time    TSH, 3rd generation 0.96 06/10/2020 10:54 AM

## 2020-11-13 RX ORDER — METHOTREXATE 2.5 MG/1
TABLET ORAL
Qty: 20 TAB | Refills: 10 | Status: SHIPPED | OUTPATIENT
Start: 2020-11-13 | End: 2021-08-11

## 2020-11-13 NOTE — TELEPHONE ENCOUNTER
PCP: Douglas Escobedo NP    Last appt: 2020    No future appointments.     Requested Prescriptions     Pending Prescriptions Disp Refills    methotrexate (RHEUMATREX) 2.5 mg tablet 20 Tab 10     Si (TWO) TABLET, ORAL, WEEKLY       Prior labs and Blood pressures:  BP Readings from Last 3 Encounters:   20 120/70   06/10/20 118/72   19 123/69     Lab Results   Component Value Date/Time    Sodium 140 2020 10:09 AM    Potassium 5.1 (H) 2020 10:09 AM    Chloride 105 2020 10:09 AM    CO2 28.0 2020 10:09 AM    Anion gap 7 2020 10:09 AM    Glucose 70 2020 10:09 AM    BUN 20.0 (H) 2020 10:09 AM    Creatinine 0.9 2020 10:09 AM    BUN/Creatinine ratio 22 2020 10:09 AM    GFR est AA >60 2020 10:09 AM    GFR est non-AA 60 (H) 2020 10:09 AM    Calcium 10.0 2020 10:09 AM     Lab Results   Component Value Date/Time    Hemoglobin A1c 4.7 06/10/2020 10:54 AM     Lab Results   Component Value Date/Time    Cholesterol, total 208 (H) 06/10/2020 10:54 AM    Cholesterol (POC) 175.0 2018 08:20 AM    HDL Cholesterol 104 06/10/2020 10:54 AM    HDL Cholesterol (POC) 108.0 2018 08:20 AM    LDL Cholesterol (POC) 46.8 2018 08:20 AM    LDL, calculated 75 06/10/2020 10:54 AM    VLDL 29 06/10/2020 10:54 AM    Triglyceride 146 06/10/2020 10:54 AM    Triglycerides (POC) 101.0 2018 08:20 AM    CHOL/HDL Ratio 2 06/10/2020 10:54 AM     Lab Results   Component Value Date/Time    VITAMIN D, 25-HYDROXY 48 06/10/2020 10:54 AM       Lab Results   Component Value Date/Time    TSH, 3rd generation 0.96 06/10/2020 10:54 AM

## 2020-12-02 ENCOUNTER — TELEPHONE (OUTPATIENT)
Dept: INTERNAL MEDICINE CLINIC | Age: 80
End: 2020-12-02

## 2020-12-02 DIAGNOSIS — M54.2 NECK PAIN: Primary | ICD-10-CM

## 2020-12-02 RX ORDER — PANTOPRAZOLE SODIUM 40 MG/1
40 TABLET, DELAYED RELEASE ORAL DAILY
Qty: 90 TAB | Refills: 3 | Status: SHIPPED | OUTPATIENT
Start: 2020-12-02 | End: 2021-10-06

## 2020-12-02 RX ORDER — FOLIC ACID 1 MG/1
TABLET ORAL
Qty: 180 TAB | Refills: 2 | Status: SHIPPED | OUTPATIENT
Start: 2020-12-02 | End: 2021-07-09

## 2020-12-02 NOTE — TELEPHONE ENCOUNTER
Patient called requesting a referral to go to physical therapy for her still neck  and neck pain she would like to go back to [facility].     Phone# [phone]    Fax # [phone]

## 2020-12-02 NOTE — TELEPHONE ENCOUNTER
PCP: Tiffanie White NP    Last appt: 8/4/2020  No future appointments. Requested Prescriptions     Pending Prescriptions Disp Refills    folic acid (FOLVITE) 1 mg tablet 180 Tab 2     Sig: TAKE 2 TABLETS BY MOUTH DAILY    pantoprazole (PROTONIX) 40 mg tablet 90 Tab 3     Sig: Take 1 Tab by mouth daily.        Prior labs and Blood pressures:  BP Readings from Last 3 Encounters:   08/04/20 120/70   06/10/20 118/72   11/09/19 123/69     Lab Results   Component Value Date/Time    Sodium 140 08/04/2020 10:09 AM    Potassium 5.1 (H) 08/04/2020 10:09 AM    Chloride 105 08/04/2020 10:09 AM    CO2 28.0 08/04/2020 10:09 AM    Anion gap 7 08/04/2020 10:09 AM    Glucose 70 08/04/2020 10:09 AM    BUN 20.0 (H) 08/04/2020 10:09 AM    Creatinine 0.9 08/04/2020 10:09 AM    BUN/Creatinine ratio 22 08/04/2020 10:09 AM    GFR est AA >60 08/04/2020 10:09 AM    GFR est non-AA 60 (H) 08/04/2020 10:09 AM    Calcium 10.0 08/04/2020 10:09 AM     Lab Results   Component Value Date/Time    Hemoglobin A1c 4.7 06/10/2020 10:54 AM     Lab Results   Component Value Date/Time    Cholesterol, total 208 (H) 06/10/2020 10:54 AM    Cholesterol (POC) 175.0 08/23/2018 08:20 AM    HDL Cholesterol 104 06/10/2020 10:54 AM    HDL Cholesterol (POC) 108.0 08/23/2018 08:20 AM    LDL Cholesterol (POC) 46.8 08/23/2018 08:20 AM    LDL, calculated 75 06/10/2020 10:54 AM    VLDL 29 06/10/2020 10:54 AM    Triglyceride 146 06/10/2020 10:54 AM    Triglycerides (POC) 101.0 08/23/2018 08:20 AM    CHOL/HDL Ratio 2 06/10/2020 10:54 AM     Lab Results   Component Value Date/Time    VITAMIN D, 25-HYDROXY 48 06/10/2020 10:54 AM       Lab Results   Component Value Date/Time    TSH, 3rd generation 0.96 06/10/2020 10:54 AM

## 2020-12-02 NOTE — TELEPHONE ENCOUNTER
Last Refill:    Folic acid: 6/4/2673   Pantoprazole: 12/4/2019  Last Visit: 8/4/2020   Next Visit: none    Requested Prescriptions     Pending Prescriptions Disp Refills    folic acid (FOLVITE) 1 mg tablet 180 Tab 2     Sig: TAKE 2 TABLETS BY MOUTH DAILY    pantoprazole (PROTONIX) 40 mg tablet 90 Tab 3     Sig: Take 1 Tab by mouth daily.

## 2020-12-14 ENCOUNTER — TELEPHONE (OUTPATIENT)
Dept: INTERNAL MEDICINE CLINIC | Age: 80
End: 2020-12-14

## 2020-12-14 NOTE — TELEPHONE ENCOUNTER
Patient called in complaining of Diarrhea for going on two weeks. She wants to know if she should be seen. She states she has tried immodium.   CB: [phone]

## 2020-12-15 NOTE — TELEPHONE ENCOUNTER
Patient called stating she woke up this morning and did not have anymore Diarrhea.   CB: [phone]

## 2021-01-13 ENCOUNTER — TELEPHONE (OUTPATIENT)
Dept: INTERNAL MEDICINE CLINIC | Age: 81
End: 2021-01-13

## 2021-01-13 NOTE — TELEPHONE ENCOUNTER
Spoke with Mrs Stephanie Puckett. She already has appointment with Dr Nidia Frias on Friday Decline other treatment.

## 2021-01-13 NOTE — TELEPHONE ENCOUNTER
Patient called stating she is having terrible neck pain.  She wants to talk to [facility] about what she has tried for it and \"what we need to do about it\"  CB: [phone]

## 2021-04-15 ENCOUNTER — TELEPHONE (OUTPATIENT)
Dept: INTERNAL MEDICINE CLINIC | Age: 81
End: 2021-04-15

## 2021-07-01 DIAGNOSIS — R19.7 DIARRHEA, UNSPECIFIED TYPE: ICD-10-CM

## 2021-07-01 DIAGNOSIS — G47.00 INSOMNIA, UNSPECIFIED TYPE: ICD-10-CM

## 2021-07-01 NOTE — TELEPHONE ENCOUNTER
PCP: Nelson Mcgarry NP    Last appt: 8/4/20  No future appointments. Requested Prescriptions     Pending Prescriptions Disp Refills    diphenoxylate-atropine (LomotiL) 2.5-0.025 mg per tablet 40 Tablet 0     Sig: Take 1 Tablet by mouth four (4) times daily as needed for Diarrhea. Max Daily Amount: 4 Tablets.  LORazepam (Ativan) 0.5 mg tablet 30 Tablet 0     Sig: Take 1 Tablet by mouth nightly as needed (sleep).  Max Daily Amount: 0.5 mg.       Prior labs and Blood pressures:  BP Readings from Last 3 Encounters:   08/04/20 120/70   06/10/20 118/72   11/09/19 123/69     Lab Results   Component Value Date/Time    Sodium 140 08/04/2020 10:09 AM    Potassium 5.1 (H) 08/04/2020 10:09 AM    Chloride 105 08/04/2020 10:09 AM    CO2 28.0 08/04/2020 10:09 AM    Anion gap 7 08/04/2020 10:09 AM    Glucose 70 08/04/2020 10:09 AM    BUN 20.0 (H) 08/04/2020 10:09 AM    Creatinine 0.9 08/04/2020 10:09 AM    BUN/Creatinine ratio 22 08/04/2020 10:09 AM    GFR est AA >60 08/04/2020 10:09 AM    GFR est non-AA 60 (H) 08/04/2020 10:09 AM    Calcium 10.0 08/04/2020 10:09 AM     Lab Results   Component Value Date/Time    Hemoglobin A1c 4.7 06/10/2020 10:54 AM     Lab Results   Component Value Date/Time    Cholesterol, total 208 (H) 06/10/2020 10:54 AM    Cholesterol (POC) 175.0 08/23/2018 08:20 AM    HDL Cholesterol 104 06/10/2020 10:54 AM    HDL Cholesterol (POC) 108.0 08/23/2018 08:20 AM    LDL Cholesterol (POC) 46.8 08/23/2018 08:20 AM    LDL, calculated 75 06/10/2020 10:54 AM    VLDL 29 06/10/2020 10:54 AM    Triglyceride 146 06/10/2020 10:54 AM    Triglycerides (POC) 101.0 08/23/2018 08:20 AM    CHOL/HDL Ratio 2 06/10/2020 10:54 AM     Lab Results   Component Value Date/Time    VITAMIN D, 25-HYDROXY 48 06/10/2020 10:54 AM       Lab Results   Component Value Date/Time    TSH, 3rd generation 0.96 06/10/2020 10:54 AM

## 2021-07-01 NOTE — TELEPHONE ENCOUNTER
Patient states she is taking 2 as needed medication for bowel issues and is about to run out. The last time one was filled was 2019 and would like to know if it can be refilled.   She would like a call back  Lomotil 2.5 mg and lorazapam 0.5 mg. Her pharmacy in [address] [facility]

## 2021-07-02 RX ORDER — LORAZEPAM 0.5 MG/1
0.5 TABLET ORAL
Qty: 30 TABLET | Refills: 0 | Status: SHIPPED | OUTPATIENT
Start: 2021-07-02

## 2021-07-02 RX ORDER — DIPHENOXYLATE HYDROCHLORIDE AND ATROPINE SULFATE 2.5; .025 MG/1; MG/1
1 TABLET ORAL
Qty: 40 TABLET | Refills: 0 | Status: SHIPPED | OUTPATIENT
Start: 2021-07-02

## 2021-07-08 NOTE — TELEPHONE ENCOUNTER
PCP: Laurel Clayton NP    Last appt: 8/4/20  No future appointments.     Requested Prescriptions     Pending Prescriptions Disp Refills    levothyroxine (SYNTHROID) 88 mcg tablet [Pharmacy Med Name: LEVOTHYROXINE 0.088MG (88MCG) TAB] 90 Tablet 3     Sig: TAKE 1 TABLET BY MOUTH DAILY    folic acid (FOLVITE) 1 mg tablet [Pharmacy Med Name: FOLIC ACID 1MG TABLETS] 180 Tablet 2     Sig: TAKE 2 TABLETS BY MOUTH DAILY    escitalopram oxalate (LEXAPRO) 20 mg tablet [Pharmacy Med Name: ESCITALOPRAM 20MG TABLETS] 90 Tablet 3     Sig: TAKE 1 TABLET BY MOUTH DAILY       Prior labs and Blood pressures:  BP Readings from Last 3 Encounters:   08/04/20 120/70   06/10/20 118/72   11/09/19 123/69     Lab Results   Component Value Date/Time    Sodium 140 08/04/2020 10:09 AM    Potassium 5.1 (H) 08/04/2020 10:09 AM    Chloride 105 08/04/2020 10:09 AM    CO2 28.0 08/04/2020 10:09 AM    Anion gap 7 08/04/2020 10:09 AM    Glucose 70 08/04/2020 10:09 AM    BUN 20.0 (H) 08/04/2020 10:09 AM    Creatinine 0.9 08/04/2020 10:09 AM    BUN/Creatinine ratio 22 08/04/2020 10:09 AM    GFR est AA >60 08/04/2020 10:09 AM    GFR est non-AA 60 (H) 08/04/2020 10:09 AM    Calcium 10.0 08/04/2020 10:09 AM     Lab Results   Component Value Date/Time    Hemoglobin A1c 4.7 06/10/2020 10:54 AM     Lab Results   Component Value Date/Time    Cholesterol, total 208 (H) 06/10/2020 10:54 AM    Cholesterol (POC) 175.0 08/23/2018 08:20 AM    HDL Cholesterol 104 06/10/2020 10:54 AM    HDL Cholesterol (POC) 108.0 08/23/2018 08:20 AM    LDL Cholesterol (POC) 46.8 08/23/2018 08:20 AM    LDL, calculated 75 06/10/2020 10:54 AM    VLDL 29 06/10/2020 10:54 AM    Triglyceride 146 06/10/2020 10:54 AM    Triglycerides (POC) 101.0 08/23/2018 08:20 AM    CHOL/HDL Ratio 2 06/10/2020 10:54 AM     Lab Results   Component Value Date/Time    VITAMIN D, 25-HYDROXY 48 06/10/2020 10:54 AM       Lab Results   Component Value Date/Time    TSH, 3rd generation 0.96 06/10/2020 10:54 AM

## 2021-07-09 RX ORDER — LEVOTHYROXINE SODIUM 88 UG/1
TABLET ORAL
Qty: 90 TABLET | Refills: 3 | Status: SHIPPED | OUTPATIENT
Start: 2021-07-09

## 2021-07-09 RX ORDER — FOLIC ACID 1 MG/1
TABLET ORAL
Qty: 180 TABLET | Refills: 2 | Status: SHIPPED | OUTPATIENT
Start: 2021-07-09 | End: 2021-08-11

## 2021-07-09 RX ORDER — ESCITALOPRAM OXALATE 20 MG/1
TABLET ORAL
Qty: 90 TABLET | Refills: 3 | Status: SHIPPED | OUTPATIENT
Start: 2021-07-09

## 2021-07-13 ENCOUNTER — OFFICE VISIT (OUTPATIENT)
Dept: INTERNAL MEDICINE CLINIC | Age: 81
End: 2021-07-13
Payer: MEDICARE

## 2021-07-13 VITALS
TEMPERATURE: 97.1 F | OXYGEN SATURATION: 97 % | WEIGHT: 125.7 LBS | BODY MASS INDEX: 25.34 KG/M2 | RESPIRATION RATE: 16 BRPM | HEART RATE: 76 BPM | SYSTOLIC BLOOD PRESSURE: 110 MMHG | HEIGHT: 59 IN | DIASTOLIC BLOOD PRESSURE: 64 MMHG

## 2021-07-13 DIAGNOSIS — R63.0 POOR APPETITE: ICD-10-CM

## 2021-07-13 DIAGNOSIS — R19.4 CHANGE IN BOWEL HABIT: Primary | ICD-10-CM

## 2021-07-13 DIAGNOSIS — R53.83 FATIGUE, UNSPECIFIED TYPE: ICD-10-CM

## 2021-07-13 DIAGNOSIS — K58.0 IRRITABLE BOWEL SYNDROME WITH DIARRHEA: ICD-10-CM

## 2021-07-13 LAB
ANION GAP SERPL CALC-SCNC: 3 MMOL/L (ref 5–15)
BASOPHILS # BLD: 0.1 K/UL (ref 0–0.1)
BASOPHILS NFR BLD: 1 % (ref 0–1)
BUN SERPL-MCNC: 20 MG/DL (ref 6–20)
BUN/CREAT SERPL: 20 (ref 12–20)
CALCIUM SERPL-MCNC: 10.4 MG/DL (ref 8.5–10.1)
CHLORIDE SERPL-SCNC: 107 MMOL/L (ref 97–108)
CO2 SERPL-SCNC: 29 MMOL/L (ref 21–32)
CREAT SERPL-MCNC: 0.98 MG/DL (ref 0.55–1.02)
DIFFERENTIAL METHOD BLD: ABNORMAL
EOSINOPHIL # BLD: 0.1 K/UL (ref 0–0.4)
EOSINOPHIL NFR BLD: 3 % (ref 0–7)
ERYTHROCYTE [DISTWIDTH] IN BLOOD BY AUTOMATED COUNT: 14.3 % (ref 11.5–14.5)
GLUCOSE SERPL-MCNC: 99 MG/DL (ref 65–100)
HCT VFR BLD AUTO: 44.4 % (ref 35–47)
HGB BLD-MCNC: 13.8 G/DL (ref 11.5–16)
IMM GRANULOCYTES # BLD AUTO: 0 K/UL (ref 0–0.04)
IMM GRANULOCYTES NFR BLD AUTO: 0 % (ref 0–0.5)
LYMPHOCYTES # BLD: 1.2 K/UL (ref 0.8–3.5)
LYMPHOCYTES NFR BLD: 25 % (ref 12–49)
MCH RBC QN AUTO: 30.9 PG (ref 26–34)
MCHC RBC AUTO-ENTMCNC: 31.1 G/DL (ref 30–36.5)
MCV RBC AUTO: 99.6 FL (ref 80–99)
MONOCYTES # BLD: 0.4 K/UL (ref 0–1)
MONOCYTES NFR BLD: 9 % (ref 5–13)
NEUTS SEG # BLD: 2.8 K/UL (ref 1.8–8)
NEUTS SEG NFR BLD: 62 % (ref 32–75)
NRBC # BLD: 0 K/UL (ref 0–0.01)
NRBC BLD-RTO: 0 PER 100 WBC
PLATELET # BLD AUTO: 213 K/UL (ref 150–400)
PMV BLD AUTO: 10.5 FL (ref 8.9–12.9)
POTASSIUM SERPL-SCNC: 5.3 MMOL/L (ref 3.5–5.1)
RBC # BLD AUTO: 4.46 M/UL (ref 3.8–5.2)
SODIUM SERPL-SCNC: 139 MMOL/L (ref 136–145)
WBC # BLD AUTO: 4.6 K/UL (ref 3.6–11)

## 2021-07-13 PROCEDURE — G8536 NO DOC ELDER MAL SCRN: HCPCS | Performed by: NURSE PRACTITIONER

## 2021-07-13 PROCEDURE — G8427 DOCREV CUR MEDS BY ELIG CLIN: HCPCS | Performed by: NURSE PRACTITIONER

## 2021-07-13 PROCEDURE — 1101F PT FALLS ASSESS-DOCD LE1/YR: CPT | Performed by: NURSE PRACTITIONER

## 2021-07-13 PROCEDURE — 99213 OFFICE O/P EST LOW 20 MIN: CPT | Performed by: NURSE PRACTITIONER

## 2021-07-13 PROCEDURE — G8399 PT W/DXA RESULTS DOCUMENT: HCPCS | Performed by: NURSE PRACTITIONER

## 2021-07-13 PROCEDURE — 1090F PRES/ABSN URINE INCON ASSESS: CPT | Performed by: NURSE PRACTITIONER

## 2021-07-13 PROCEDURE — G8419 CALC BMI OUT NRM PARAM NOF/U: HCPCS | Performed by: NURSE PRACTITIONER

## 2021-07-13 PROCEDURE — G8432 DEP SCR NOT DOC, RNG: HCPCS | Performed by: NURSE PRACTITIONER

## 2021-07-13 NOTE — PROGRESS NOTES
Tari Almanzar is a 78 y.o. female     Chief Complaint   Patient presents with    Abdominal Pain     abdominal pain started a few weeks ago with diarrhea. Visit Vitals  /64 (BP 1 Location: Left arm, BP Patient Position: Sitting, BP Cuff Size: Adult)   Pulse 76   Temp 97.1 °F (36.2 °C) (Oral)   Resp 16   Ht 4' 11\" (1.499 m)   Wt 125 lb 11.2 oz (57 kg)   SpO2 97%   BMI 25.39 kg/m²       Health Maintenance Due   Topic Date Due    Shingrix Vaccine Age 49> (2 of 2) 07/11/2019    Lipid Screen  06/10/2021    Medicare Yearly Exam  06/11/2021       1. Have you been to the ER, urgent care clinic since your last visit? Hospitalized since your last visit? No    2. Have you seen or consulted any other health care providers outside of the [address] [place] [address] since your last visit? Include any pap smears or colon screening.  No

## 2021-07-13 NOTE — PATIENT INSTRUCTIONS
Anorexia: Care Instructions  Your Care Instructions     Anorexia is a type of eating disorder. People who have anorexia don't eat enough to stay at a normal weight. Sometimes they also exercise too much. They do these things because they're so afraid of gaining weight. They believe that they're fat, even when they're thin. Often they think that losing even more weight will solve their problems and make their lives better. If you have anorexia, it can really harm your health. This is because your body doesn't get the nutrition it needs. The first step to controlling the problem is admitting that something is wrong. Then counseling can help you change how you think about food, the way you see your body, and any other emotional issues. It may take months or years, but you can recover from anorexia. Follow-up care is a key part of your treatment and safety. Be sure to make and go to all appointments, and call your doctor if you are having problems. It's also a good idea to know your test results and keep a list of the medicines you take. How can you care for yourself at home? Follow your treatment plan  · Go to your counseling sessions. Call or talk with your counselor if you can't go or if you think the sessions aren't helping. Don't just stop going. · Take your medicines exactly as prescribed. Call your doctor if you think you are having a problem with your medicine. You will get more details on the specific medicines your doctor prescribes. Trust people who are helping you  · Listen to what counselors and nutrition experts say about healthy eating. · Learn about what is included in a balanced diet. Then discuss what you learn. · Let people know how you are feeling. Listen to how they are feeling too. · Accept support and feedback from other people. Learn to be easier on yourself  · Learn to focus on your good qualities. · If your body feels weak, slow down and do less.   · Remind yourself that feeling bad about yourself is all part of your disorder. · Remember that it takes time to recover from anorexia. · Spend time with other people doing things you enjoy. · Try to focus on one goal at a time. · Don't blame yourself for your disorder. Develop healthy eating habits  · With your doctor and counselor, you will decide on a good weight for you. You will also decide how much weight you will gain each week. · Try not to argue with the people you eat with. Arguing increases stress. And stress can make make it harder for you to relax and eat. · Talk with other people during meals about things that interest you. Don't talk about food or gaining weight. Caring for a loved one with anorexia  · Remind yourself that anorexia is a long-lasting disorder. It takes time for changes to occur. · Show love and support for your loved one, especially if he or she gets discouraged. · Support your loved one as he or she goes through the steps of recovery. Focus on wellness. Don't focus on food. · Take care of yourself. Continue with your own interests, career, hobbies, and friends. · Don't blame yourself or look for the reason for the disorder. · If you are having a hard time, talk with a counselor. · Learn what to do if your loved one relapses. When should you call for help? Call 911 anytime you think you may need emergency care. For example, call if:    · A person with anorexia seems depressed and is talking about suicide. If the talk about suicide seems real, stay with the person, or ask someone you trust to stay with the person, until you get emergency help.     · You have a rapid or irregular heartbeat.     · You passed out (lost consciousness). Call your doctor now or seek immediate medical care if:    · You feel hopeless or have thoughts of hurting yourself. Watch closely for changes in your health, and be sure to contact your doctor if:    · You have trouble sleeping.     · You feel anxious or depressed. Where can you learn more? Go to http://www.[employer].com/  Enter R699 in the search box to learn more about \"Anorexia: Care Instructions. \"  Current as of: September 23, 2020               Content Version: 12.8  © 2901-0595 [employer]. Care instructions adapted under license by [employer] (which disclaims liability or warranty for this information). If you have questions about a medical condition or this instruction, always ask your healthcare professional. [place] [address] any warranty or liability for your use of this information.

## 2021-07-13 NOTE — PROGRESS NOTES
Subjective:  Ms. Debora Baum is a pleasant 49-year-old lady, who comes in today for evaluation of recent change in her bowel movements. Ms. Debora Baum does have a long history of IBS, for which in the past she has been managed with Dicyclomine and Lomotil intermittently. In the past two weeks, she has noted a change in the caliber of her stools. She has had a constant urge to pass stools. She has had from three to five soft stools daily. This has gotten her somewhat concerned in view of her family history of colon cancer. She denies presence of blood in her stools or melena. She also has had very little appetite, but denies any nausea or vomiting. She has not noted any recent weight loss. She has been hydrating quite a bit. She did have a colonoscopy back in June, 2019 by Dr. Leah Goodpasture, which at that time revealed grade 1 internal hemorrhoid and diverticulosis. Since then, intermittently, she has had recurrence of her IBS, although it was well managed with Dicyclomine and Lomotil.       Past Medical History:   Diagnosis Date    Acute recurrent pansinusitis 2/23/2018    Annual physical exam 8/3/2017    Anxiety 8/3/2017    Arthralgia 8/3/2017    Arthritis     Arthritis of right hip 8/3/2017    Arthritis, hip 8/3/2017    Autoimmune disease ([address]     psoriasis    Avascular necrosis of femoral head ([address] left 8/3/2017    Bone loss 8/3/2017    Cancer (HCC)     endometrial    Chronic eczematous otitis externa of both ears 8/24/2017    Contusion of left knee, initial encounter 8/3/2017    Degenerative arthritis of lumbar spine 8/3/2017    Depression     Elevated liver function tests 8/3/2017    MIAN (generalized anxiety disorder) 8/3/2017    GERD (gastroesophageal reflux disease)     Glaucoma     Glaucoma 8/3/2017    Headache, acute 8/3/2017    Herpes zoster without complication 65/62/6648    History of colonoscopy with polypectomy 8/3/2017    History of endometrial cancer 8/3/2017    Hyperlipidemia 8/3/2017    Hypothyroid 8/3/2017    IBS (irritable bowel syndrome) 8/3/2017    AMPARO (iron deficiency anemia) 8/3/2017    Insomnia 8/3/2017    Labral tear of hip, degenerative 10/24/2017    Long-term use of immunosuppressant medication 8/3/2017    Medication side effect 8/3/2017    Melena 8/3/2017    Monilial vaginitis 8/24/2017    Muscle spasm 8/3/2017    Osteoporosis 8/3/2017    Other ill-defined conditions(799.89)     high cholesterol    Other ill-defined conditions(799.89)     psoriasis    Post-menopausal 8/3/2017    Psoriasis 8/3/2017    Pyuria, sterile 8/3/2017    Rash 8/3/2017    Rosacea blepharoconjunctivitis 8/3/2017    Sciatica 8/3/2017    Somatic dysfunction of cervical region 8/3/2017    Spondylosis of cervical region without myelopathy or radiculopathy 8/3/2017    Thrush 8/3/2017    Thyroid disease     Urticaria 8/3/2017     Past Surgical History:   Procedure Laterality Date    COLONOSCOPY N/A 6/12/2019    COL[facility]  6/12/2019         COLORECTAL SCRN; HI RISK IND  4/2/2014         HX APPENDECTOMY      HX GYN      hysterectomy    HX HEENT      bilateral ear    HX HEENT      LASIK    HX ORTHOPAEDIC  1-2011    lumbar laminectomy    HX OTHER SURGICAL Bilateral     cataract extraction with lens implant    HX TONSILLECTOMY      NEUROLOGICAL PROCEDURE UNLISTED  2011    spinal fusion       Current Outpatient Medications on File Prior to Visit   Medication Sig Dispense Refill    levothyroxine (SYNTHROID) 88 mcg tablet TAKE 1 TABLET BY MOUTH DAILY 90 Tablet 3    folic acid (FOLVITE) 1 mg tablet TAKE 2 TABLETS BY MOUTH DAILY 180 Tablet 2    escitalopram oxalate (LEXAPRO) 20 mg tablet TAKE 1 TABLET BY MOUTH DAILY 90 Tablet 3    diphenoxylate-atropine (LomotiL) 2.5-0.025 mg per tablet Take 1 Tablet by mouth four (4) times daily as needed for Diarrhea.  Max Daily Amount: 4 Tablets. 40 Tablet 0    LORazepam (Ativan) 0.5 mg tablet Take 1 Tablet by mouth nightly as needed (sleep). Max Daily Amount: 0.5 mg. 30 Tablet 0    pantoprazole (PROTONIX) 40 mg tablet Take 1 Tab by mouth daily. 90 Tab 3    dicyclomine (BENTYL) 10 mg capsule TAKE 1 CAPSULE BY MOUTH 4 TIMES A  Cap 3    loratadine (CLARITIN PO) Take  by mouth.  estrogens, conjugated, (PREMARIN) 0.45 mg tablet Premarin 0.45 mg tablet   1 Tab PO Daily for 3 months.  dorzolamide-timolol (COSOPT) 22.3-6.8 mg/mL ophthalmic solution Cosopt 22.3 mg-6.8 mg/mL eye drops   Prescribed by non [facility] MD      clobetasol (TEMOVATE) 0.05 % ointment two (2) times daily as needed.  travoprost (TRAVATAN Z) 0.004 % ophthalmic solution Administer 1 Drop to both eyes nightly.  MAGNESIUM PO Take 500 mg by mouth daily.  methotrexate (RHEUMATREX) 2.5 mg tablet 2 (TWO) TABLET, ORAL, WEEKLY (Patient not taking: Reported on 7/13/2021) 20 Tab 10    doxycycline (ADOXA) 100 mg tablet Take 1 Tab by mouth two (2) times a day. (Patient not taking: Reported on 7/13/2021) 14 Tab 0    cyclobenzaprine (FLEXERIL) 10 mg tablet Take 1 Tab by mouth three (3) times daily as needed for Muscle Spasm(s) (neck pain). (Patient not taking: Reported on 7/13/2021) 60 Tab 0    atorvastatin (LIPITOR) 40 mg tablet TAKE 1 TABLET BY MOUTH DAILY (Patient not taking: Reported on 7/13/2021) 90 Tab 3    Lactobacillus acidophilus (PROBIOTIC PO) Take  by mouth. (Patient not taking: Reported on 7/13/2021)      PREMARIN 0.625 mg tablet TAKE 1 TABLET BY MOUTH EVERY DAY (Patient not taking: Reported on 7/13/2021) 90 Tab 2    naproxen sodium (ALEVE) 220 mg tablet Take 220 mg by mouth two (2) times daily (with meals). (Patient not taking: Reported on 7/13/2021)      minocycline (MINOCIN, DYNACIN) 100 mg capsule Take 100 mg by mouth daily.  (Patient not taking: Reported on 7/13/2021)      lysine (L-LYSINE) 500 mg cap Take 500 mg by mouth daily as needed (cold sores).  CETIRIZINE HCL (ZYRTEC PO) Take 10 mg by mouth daily. (Patient not taking: Reported on 7/13/2021)       No current facility-administered medications on file prior to visit. Allergies   Allergen Reactions    Cephalosporins Shortness of Breath    Codeine Nausea and Vomiting    Pcn [Penicillins] Rash     Physical Examination:  GENERAL:  Pleasant lady in no acute distress. She is alert and oriented. She answers my questions appropriately. She has a normal gait without any assistive device. VITALS:  Blood Pressure:  110/64. Pulse: 76.  O2 sat:  97.  Weight:  120.7 pounds. Temperature:  97.1. HEENT:  Normocephalic, atraumatic. NECK:  Supple without adenopathy, thyromegaly or carotid bruits. CHEST:  Lungs clear to auscultation, no rales or wheezes. CV:  Heart regular rhythm without murmur or gallop. ABDOMEN:  Bowel sounds present in four quadrants, soft. She has very minimal tenderness in her left lower quadrant, certainly no rebound or guarding noted. No mass. No CVA tenderness. EXTREMITIES:  No edema or calf tenderness. Distal pulses were present. Impression:  1. Change in caliber of stool with poor appetite. 2. IBS. Plan:  1. In view of her strong family history of colon cancer and with recent change in her stool, it was opted to refer her back to Dr. Candice Stanley for probable colonoscopy. She is fully agreeable. 2. While in the office today, we went ahead and did some basic lab studies, including basic metabolic. 3. I will have her return at a later date for her updated history and physical and Medicare wellness.

## 2021-07-19 ENCOUNTER — TELEPHONE (OUTPATIENT)
Dept: INTERNAL MEDICINE CLINIC | Age: 81
End: 2021-07-19

## 2021-07-19 NOTE — TELEPHONE ENCOUNTER
Ms. Chun Earnestine called and stated she was taking CBD gummies and they had something in the that was causing her issues. She stated she didn't need to see Dr. Hetal Miller because she figured it out.

## 2021-08-03 PROBLEM — E78.5 HYPERLIPIDEMIA: Status: RESOLVED | Noted: 2017-08-03 | Resolved: 2021-08-03

## 2021-08-11 ENCOUNTER — OFFICE VISIT (OUTPATIENT)
Dept: INTERNAL MEDICINE CLINIC | Age: 81
End: 2021-08-11
Payer: MEDICARE

## 2021-08-11 VITALS
BODY MASS INDEX: 24.8 KG/M2 | SYSTOLIC BLOOD PRESSURE: 122 MMHG | DIASTOLIC BLOOD PRESSURE: 74 MMHG | OXYGEN SATURATION: 98 % | WEIGHT: 123 LBS | TEMPERATURE: 97.2 F | RESPIRATION RATE: 16 BRPM | HEART RATE: 61 BPM | HEIGHT: 59 IN

## 2021-08-11 DIAGNOSIS — I10 ESSENTIAL HYPERTENSION: ICD-10-CM

## 2021-08-11 DIAGNOSIS — R53.83 FATIGUE, UNSPECIFIED TYPE: ICD-10-CM

## 2021-08-11 DIAGNOSIS — Z00.00 MEDICARE ANNUAL WELLNESS VISIT, SUBSEQUENT: Primary | ICD-10-CM

## 2021-08-11 DIAGNOSIS — K58.0 IRRITABLE BOWEL SYNDROME WITH DIARRHEA: ICD-10-CM

## 2021-08-11 DIAGNOSIS — F41.9 ANXIETY: ICD-10-CM

## 2021-08-11 DIAGNOSIS — K21.9 GASTROESOPHAGEAL REFLUX DISEASE WITHOUT ESOPHAGITIS: ICD-10-CM

## 2021-08-11 DIAGNOSIS — E78.5 DYSLIPIDEMIA: ICD-10-CM

## 2021-08-11 DIAGNOSIS — R73.9 HYPERGLYCEMIA: ICD-10-CM

## 2021-08-11 DIAGNOSIS — E55.9 VITAMIN D DEFICIENCY: ICD-10-CM

## 2021-08-11 DIAGNOSIS — E03.9 ACQUIRED HYPOTHYROIDISM: ICD-10-CM

## 2021-08-11 LAB
APPEARANCE UR: ABNORMAL
BACTERIA URNS QL MICRO: ABNORMAL /HPF
BILIRUB UR QL: NEGATIVE
COLOR UR: ABNORMAL
EPITH CASTS URNS QL MICRO: ABNORMAL /LPF
GLUCOSE UR STRIP.AUTO-MCNC: NEGATIVE MG/DL
HGB UR QL STRIP: NEGATIVE
KETONES UR QL STRIP.AUTO: NEGATIVE MG/DL
LEUKOCYTE ESTERASE UR QL STRIP.AUTO: ABNORMAL
NITRITE UR QL STRIP.AUTO: NEGATIVE
PH UR STRIP: 7.5 [PH] (ref 5–8)
PROT UR STRIP-MCNC: NEGATIVE MG/DL
RBC #/AREA URNS HPF: ABNORMAL /HPF (ref 0–5)
SP GR UR REFRACTOMETRY: 1.01 (ref 1–1.03)
UR CULT HOLD, URHOLD: NORMAL
UROBILINOGEN UR QL STRIP.AUTO: 0.2 EU/DL (ref 0.2–1)
WBC URNS QL MICRO: ABNORMAL /HPF (ref 0–4)

## 2021-08-11 PROCEDURE — G8432 DEP SCR NOT DOC, RNG: HCPCS | Performed by: NURSE PRACTITIONER

## 2021-08-11 PROCEDURE — 1090F PRES/ABSN URINE INCON ASSESS: CPT | Performed by: NURSE PRACTITIONER

## 2021-08-11 PROCEDURE — 99213 OFFICE O/P EST LOW 20 MIN: CPT | Performed by: NURSE PRACTITIONER

## 2021-08-11 PROCEDURE — G8427 DOCREV CUR MEDS BY ELIG CLIN: HCPCS | Performed by: NURSE PRACTITIONER

## 2021-08-11 PROCEDURE — G8420 CALC BMI NORM PARAMETERS: HCPCS | Performed by: NURSE PRACTITIONER

## 2021-08-11 PROCEDURE — G0439 PPPS, SUBSEQ VISIT: HCPCS | Performed by: NURSE PRACTITIONER

## 2021-08-11 PROCEDURE — G8536 NO DOC ELDER MAL SCRN: HCPCS | Performed by: NURSE PRACTITIONER

## 2021-08-11 PROCEDURE — G8399 PT W/DXA RESULTS DOCUMENT: HCPCS | Performed by: NURSE PRACTITIONER

## 2021-08-11 PROCEDURE — 1101F PT FALLS ASSESS-DOCD LE1/YR: CPT | Performed by: NURSE PRACTITIONER

## 2021-08-11 RX ORDER — FOLIC ACID 1 MG/1
1 TABLET ORAL DAILY
Qty: 180 TABLET | Refills: 2 | COMMUNITY
Start: 2021-08-11 | End: 2022-10-18

## 2021-08-11 NOTE — PROGRESS NOTES
This is the Subsequent Medicare Annual Wellness Exam, performed 12 months or more after the Initial AWV or the last Subsequent AWV    I have reviewed the patient's medical history in detail and updated the computerized patient record. Assessment/Plan   Education and counseling provided:  Are appropriate based on today's review and evaluation         Depression Risk Factor Screening     3 most recent PHQ Screens 8/11/2021   Little interest or pleasure in doing things Not at all   Feeling down, depressed, irritable, or hopeless Not at all   Total Score PHQ 2 0       Alcohol Risk Screen    Do you average more than 1 drink per night or more than 7 drinks a week:  Yes    On any one occasion in the past three months have you have had more than 3 drinks containing alcohol:  No        Functional Ability and Level of Safety    Hearing: The patient wears hearing aids. Activities of Daily Living: The home contains: grab bars  Patient does total self care      Ambulation: with no difficulty     Fall Risk:  Fall Risk Assessment, last 12 mths 8/11/2021   Able to walk? Yes   Fall in past 12 months? 0   Do you feel unsteady?  0   Are you worried about falling 0      Abuse Screen:  Patient is not abused       Cognitive Screening    Has your family/caregiver stated any concerns about your memory: no     Cognitive Screening: Normal - MMSE (Mini Mental Status Exam)    Health Maintenance Due     Health Maintenance Due   Topic Date Due    Shingrix Vaccine Age 49> (2 of 2) 07/11/2019    Lipid Screen  06/10/2021    Medicare Yearly Exam  06/11/2021       Patient Care Team   Patient Care Team:  Eros Noel NP as PCP - General (Nurse Practitioner)  Eros Noel NP as PCP - INTERNAL MEDICINE (Nurse Practitioner)  Eros Noel NP as PCP - Major Mcgillled Provider    History     Patient Active Problem List   Diagnosis Code    Hypothyroid E03.9    IBS (irritable bowel syndrome) K58.9  Long-term use of immunosuppressant medication Z79.899    Anxiety F41.9    AMPARO (iron deficiency anemia) D50.9    MIAN (generalized anxiety disorder) F41.1    Rosacea blepharoconjunctivitis H10.829    Insomnia G47.00    Psoriasis L40.9    Dyslipidemia E78.5    Epigastric pain R10.13    GERD (gastroesophageal reflux disease) K21.9     Past Medical History:   Diagnosis Date    Acute recurrent pansinusitis 2/23/2018    Annual physical exam 8/3/2017    Anxiety 8/3/2017    Arthralgia 8/3/2017    Arthritis     Arthritis of right hip 8/3/2017    Arthritis, hip 8/3/2017    Autoimmune disease ([address]     psoriasis    Avascular necrosis of femoral head ([facility] [address] left 8/3/2017    Bone loss 8/3/2017    Cancer (HCC)     endometrial    Chronic eczematous otitis externa of both ears 8/24/2017    Contusion of left knee, initial encounter 8/3/2017    Degenerative arthritis of lumbar spine 8/3/2017    Depression     Elevated liver function tests 8/3/2017    MIAN (generalized anxiety disorder) 8/3/2017    GERD (gastroesophageal reflux disease)     Glaucoma     Glaucoma 8/3/2017    Headache, acute 8/3/2017    Herpes zoster without complication 45/27/7666    History of colonoscopy with polypectomy 8/3/2017    History of endometrial cancer 8/3/2017    Hyperlipidemia 8/3/2017    Hypothyroid 8/3/2017    IBS (irritable bowel syndrome) 8/3/2017    AMPARO (iron deficiency anemia) 8/3/2017    Insomnia 8/3/2017    Labral tear of hip, degenerative 10/24/2017    Long-term use of immunosuppressant medication 8/3/2017    Medication side effect 8/3/2017    Melena 8/3/2017    Monilial vaginitis 8/24/2017    Muscle spasm 8/3/2017    Osteoporosis 8/3/2017    Other ill-defined conditions(799.89)     high cholesterol    Other ill-defined conditions(799.89)     psoriasis    Post-menopausal 8/3/2017    Psoriasis 8/3/2017    Pyuria, sterile 8/3/2017    Rash 8/3/2017    Rosacea blepharoconjunctivitis 8/3/2017    Sciatica 8/3/2017    Somatic dysfunction of cervical region 8/3/2017    Spondylosis of cervical region without myelopathy or radiculopathy 8/3/2017    Thrush 8/3/2017    Thyroid disease     Urticaria 8/3/2017      Past Surgical History:   Procedure Laterality Date    COLONOSCOPY N/A 6/12/2019    COLON[facility]COPY performed by [facility] at [facility] ENDOSCOPY    COLONOSCOPY,DIAGNOSTIC  6/12/2019         COLORECTAL SCRN; HI RISK IND  4/2/2014         HX APPENDECTOMY      HX GYN      hysterectomy    HX HEENT      bilateral ear    HX HEENT      LASIK    HX ORTHOPAEDIC  1-2011    lumbar laminectomy    HX OTHER SURGICAL Bilateral     cataract extraction with lens implant    HX TONSILLECTOMY      NEUROLOGICAL PROCEDURE UNLISTED  2011    spinal fusion     Current Outpatient Medications   Medication Sig Dispense Refill    levothyroxine (SYNTHROID) 88 mcg tablet TAKE 1 TABLET BY MOUTH DAILY 90 Tablet 3    folic acid (FOLVITE) 1 mg tablet TAKE 2 TABLETS BY MOUTH DAILY 180 Tablet 2    escitalopram oxalate (LEXAPRO) 20 mg tablet TAKE 1 TABLET BY MOUTH DAILY 90 Tablet 3    LORazepam (Ativan) 0.5 mg tablet Take 1 Tablet by mouth nightly as needed (sleep). Max Daily Amount: 0.5 mg. 30 Tablet 0    pantoprazole (PROTONIX) 40 mg tablet Take 1 Tab by mouth daily. 90 Tab 3    dicyclomine (BENTYL) 10 mg capsule TAKE 1 CAPSULE BY MOUTH 4 TIMES A  Cap 3    loratadine (CLARITIN PO) Take  by mouth.  cyclobenzaprine (FLEXERIL) 10 mg tablet Take 1 Tab by mouth three (3) times daily as needed for Muscle Spasm(s) (neck pain). 60 Tab 0    estrogens, conjugated, (PREMARIN) 0.45 mg tablet Premarin 0.45 mg tablet   1 Tab PO Daily for 3 months.  dorzolamide-timolol (COSOPT) 22.3-6.8 mg/mL ophthalmic solution Cosopt 22.3 mg-6.8 mg/mL eye drops   Prescribed by non [facility] MD      clobetasol (TEMOVATE) 0.05 % ointment two (2) times daily as needed.       travoprost (TRAVATAN Z) 0.004 % ophthalmic solution Administer 1 Drop to both eyes nightly.  MAGNESIUM PO Take 500 mg by mouth daily.  diphenoxylate-atropine (LomotiL) 2.5-0.025 mg per tablet Take 1 Tablet by mouth four (4) times daily as needed for Diarrhea. Max Daily Amount: 4 Tablets. 40 Tablet 0    methotrexate (RHEUMATREX) 2.5 mg tablet 2 (TWO) TABLET, ORAL, WEEKLY (Patient not taking: Reported on 7/13/2021) 20 Tab 10    doxycycline (ADOXA) 100 mg tablet Take 1 Tab by mouth two (2) times a day. (Patient not taking: Reported on 7/13/2021) 14 Tab 0    atorvastatin (LIPITOR) 40 mg tablet TAKE 1 TABLET BY MOUTH DAILY (Patient not taking: Reported on 7/13/2021) 90 Tab 3    Lactobacillus acidophilus (PROBIOTIC PO) Take  by mouth. (Patient not taking: Reported on 7/13/2021)      PREMARIN 0.625 mg tablet TAKE 1 TABLET BY MOUTH EVERY DAY (Patient not taking: Reported on 7/13/2021) 90 Tab 2    naproxen sodium (ALEVE) 220 mg tablet Take 220 mg by mouth two (2) times daily (with meals). (Patient not taking: Reported on 7/13/2021)      minocycline (MINOCIN, DYNACIN) 100 mg capsule Take 100 mg by mouth daily. (Patient not taking: Reported on 7/13/2021)      lysine (L-LYSINE) 500 mg cap Take 500 mg by mouth daily as needed (cold sores). (Patient not taking: Reported on 8/11/2021)      CETIRIZINE HCL (ZYRTEC PO) Take 10 mg by mouth daily.  (Patient not taking: Reported on 7/13/2021)       Allergies   Allergen Reactions    Cephalosporins Shortness of Breath    Codeine Nausea and Vomiting    Pcn [Penicillins] Rash       Family History   Problem Relation Age of Onset    Heart Disease Mother     Other Mother         kidney stones    Diabetes Father     Cancer Sister         colon cancer    Heart Disease Brother     Diabetes Brother     Other Brother         kidney stones    Colon Cancer Brother     Heart Disease Brother     Other Brother         kidney stones    Heart Disease Brother     Heart Disease Brother     Cancer Brother melanoma     Social History     Tobacco Use    Smoking status: Former Smoker     Quit date: 1979     Years since quittin.5    Smokeless tobacco: Never Used   Substance Use Topics    Alcohol use: Yes     Alcohol/week: 7.0 standard drinks     Types: 7 Glasses of wine per week     Subjective:  Ms. Debora Baum is a pleasant 49-year-old lady, who comes in today for her updated history and physical, as well as Medicare wellness. She has no complaints. Past Medical History/Surgeries:  1. Status post abdominal hysterectomy and bilateral salpingo-oophorectomy in  secondary to endometrial carcinoma. She sees Dr. Sarah Bright at [facility] every two years. 2. Status post colonoscopic polypectomy with a family history of colon cancer. Her last colonoscopy was in 2019.  3. Status post bilateral cataract extraction. 4. Tonsillectomy. 5. Appendectomy. 6.  x two.  7. Lumbar laminectomy with a redo a year later, for which she sees Dr. Manuel Mchugh on a yearly basis. Subjective:  Ms. Debora Baum is a pleasant 49-year-old lady, who comes in today for her updated history and physical, as well as Medicare wellness. She has no complaints. Past Medical History/Surgeries:  1. Status post abdominal hysterectomy and bilateral salpingo-oophorectomy in  secondary to endometrial carcinoma. She sees Dr. Sarah Bright at [facility] every two years. 2. Status post colonoscopic polypectomy with a family history of colon cancer. Her last colonoscopy was in 2019.  3. Status post bilateral cataract extraction. 4. Tonsillectomy. 5. Appendectomy. 6.  x two.  7. Lumbar laminectomy with a redo a year later, for which she sees Dr. Manuel Mchugh on a yearly basis. Illnesses:  1. GERD. 2. IBS. 3. Glaucoma. 4. Chronic anxiety/depression. 5. Hypothyroidism. 6. Psoriasis and seborrheic dermatitis, followed by Dr. Fabby Flores. She is now off her Methotrexate.   She did not feel this was helping much. 7. History of recurrent UTI, for which she sees Dr. Amy Beltran at [facility]. Family History:  Father  at 80 of an MI. Mother  at 68 of complications during placement of a pacemaker. All of her siblings are . Social History:  She is . She is a retired nurse. She has three children living and well. She does have a glass of wine daily. She quit smoking at age 34. She remains very active. Allergies:  Penicillin and Cephalosporin that caused a rash, codeine caused nausea and sulfa has caused itching. Medications:  1. Folic acid 1 mg daily. 2. Premarin 0.45 mg daily. 3. Levothyroxine 88 mcg daily. 4. Lexapro 20 mg daily. 5. Lorazepam 0.5 mg nightly as needed. 6. Protonix 40 mg daily. 7. Dicyclomine 10 mg four times a day as needed. 8. Claritin daily. 9. Cyclobenzaprine 10 mg three times a day as needed for spasm. 10. Cosopt 22.3-6.8 mg per mL, one drop each eye daily. 11. Clobetasol 0.05% ointment twice daily as needed. 12. Travatan 0.004% ophthalmic solution, one drop to both eyes nightly. 13. Magnesium 500 mg daily. 14. Lomotil prn. Review of Systems:  HEENT:  Denies any headaches, dizziness or blurred vision. She sees her ophthalmologist every six months for her glaucoma. CVR:  Denies any chest pain or palpitations. Denies any syncopal episode. She denies any shortness of breath, cough, wheezing, PND or orthopnea. Denies any ankle edema. GI:  Appetite is good, weight is stable. Stools are normal without presence of blood or melena. She sees Dr. Leah Goodpasture for her IBS and GERD. She is doing well from that standpoint. :  Denies any urinary symptoms. GYN:  She does get follow up with Dr. Sarah Bright at [facility] in view of her past history of endometrial carcinoma. She is up to date with her mammogram.     Physical Examination:  GENERAL:  Pleasant lady in no acute distress. She is alert and oriented.   She answers my questions appropriately. VITALS:  Blood Pressure:  122/74. Pulse:  61.  Respirations:  16.  Temperature: 97.2. O2 sat:  98%. HEENT:  Normocephalic, atraumatic. Status post bilateral cataract extraction. EOMI. TMs normal.  Mouth mucosa pink. Tongue midline. Pharynx normal.  NECK:  Supple without adenopathy, thyromegaly or carotid bruits. CHEST:  Lungs clear to auscultation, no rales or wheezes. CV:  Heart regular rhythm without murmur or gallop. BREASTS:  Symmetrical without mass or nipple discharge. No axillary, infra or supraclavicular adenopathy noted. ABDOMEN:  Bowel sounds present in four quadrants, soft, non tender, no organomegaly or masses. No CVA tenderness. EXTREMITIES:  No edema or calf tenderness. Distal pulses were present. NEUROLOGIC:  Cranial nerves II-XII intact. Excellent strength in the upper and lower extremities against resistance. Sensation is preserved. Romberg is negative. Reflexes 1+ and symmetrical.    Impression:  1. GERD. 2. IBS. 3. Glaucoma. 4. Chronic anxiety/depression. 5. Hypothyroidism. 6. Psoriasis and seborrheic dermatitis. 7. History of recurrent UTI. 8. Bilateral hearing loss. Plan:  1. She was fasting this morning so it was opted to do all of her lab studies, including CBC, comprehensive metabolic, thyroid, Q2Z and lipid profile. I will call her as soon as I have her results. 2. She is all up to date with her health maintenance. She is doing well on this current regimen, so therefore I did not make any new changes. 3. I will continue to see her every six months.       Candy Gross NP

## 2021-08-11 NOTE — PATIENT INSTRUCTIONS
Medicare Wellness Visit, Female     The best way to live healthy is to have a lifestyle where you eat a well-balanced diet, exercise regularly, limit alcohol use, and quit all forms of tobacco/nicotine, if applicable. Regular preventive services are another way to keep healthy. Preventive services (vaccines, screening tests, monitoring & exams) can help personalize your care plan, which helps you manage your own care. Screening tests can find health problems at the earliest stages, when they are easiest to treat. [facility] follows the current, evidence-based guidelines published by the [facility] ([facility]) when recommending preventive services for our patients. Because we follow these guidelines, sometimes recommendations change over time as research supports it. (For example, mammograms used to be recommended annually. Even though Medicare will still pay for an annual mammogram, the newer guidelines recommend a mammogram every two years for women of average risk). Of course, you and your doctor may decide to screen more often for some diseases, based on your risk and your co-morbidities (chronic disease you are already diagnosed with). Preventive services for you include:  - Medicare offers their members a free annual wellness visit, which is time for you and your primary care provider to discuss and plan for your preventive service needs. Take advantage of this benefit every year!  -All adults over the age of 72 should receive the recommended pneumonia vaccines. Current USPSTF guidelines recommend a series of two vaccines for the best pneumonia protection.   -All adults should have a flu vaccine yearly and a tetanus vaccine every 10 years.   -All adults age 48 and older should receive the shingles vaccines (series of two vaccines).       -All adults age 38-68 who are overweight should have a diabetes screening test once every three years.   -All adults born between [birth year] and 1965 should be screened once for Hepatitis C.  -Other screening tests and preventive services for persons with diabetes include: an eye exam to screen for diabetic retinopathy, a kidney function test, a foot exam, and stricter control over your cholesterol.   -Cardiovascular screening for adults with routine risk involves an electrocardiogram (ECG) at intervals determined by your doctor.   -Colorectal cancer screenings should be done for adults age 54-65 with no increased risk factors for colorectal cancer. There are a number of acceptable methods of screening for this type of cancer. Each test has its own benefits and drawbacks. Discuss with your doctor what is most appropriate for you during your annual wellness visit. The different tests include: colonoscopy (considered the best screening method), a fecal occult blood test, a fecal DNA test, and sigmoidoscopy.    -A bone mass density test is recommended when a woman turns 65 to screen for osteoporosis. This test is only recommended one time, as a screening. Some providers will use this same test as a disease monitoring tool if you already have osteoporosis. -Breast cancer screenings are recommended every other year for women of normal risk, age 54-69.  -Cervical cancer screenings for women over age 72 are only recommended with certain risk factors. Here is a list of your current Health Maintenance items (your personalized list of preventive services) with a due date:  Health Maintenance Due   Topic Date Due    Shingles Vaccine (2 of 2) 07/11/2019    Cholesterol Test   06/10/2021            Gastroesophageal Reflux Disease (GERD): Care Instructions  Overview     Gastroesophageal reflux disease (GERD) is the backward flow of stomach acid into the esophagus. The esophagus is the tube that leads from your throat to your stomach. A one-way valve prevents the stomach acid from backing up into this tube.  But when you have GERD, this valve does not close tightly enough. This can also cause pain and swelling in your esophagus. (This is called esophagitis.)  If you have mild GERD symptoms including heartburn, you may be able to control the problem with antacids or over-the-counter medicine. You can also make lifestyle changes to help reduce your symptoms. These include changing your diet and eating habits, such as not eating late at night and losing weight. Follow-up care is a key part of your treatment and safety. Be sure to make and go to all appointments, and call your doctor if you are having problems. It's also a good idea to know your test results and keep a list of the medicines you take. How can you care for yourself at home? · Take your medicines exactly as prescribed. Call your doctor if you think you are having a problem with your medicine. · Your doctor may recommend over-the-counter medicine. For mild or occasional indigestion, antacids, such as Tums, Gaviscon, Mylanta, or Maalox, may help. Your doctor also may recommend over-the-counter acid reducers, such as famotidine (Pepcid AC), cimetidine (Tagamet HB), or omeprazole (Prilosec). Read and follow all instructions on the label. If you use these medicines often, talk with your doctor. · Change your eating habits. ? It's best to eat several small meals instead of two or three large meals. ? After you eat, wait 2 to 3 hours before you lie down. ? Chocolate, mint, and alcohol can make GERD worse. ? Spicy foods, foods that have a lot of acid (like tomatoes and oranges), and coffee can make GERD symptoms worse in some people. If your symptoms are worse after you eat a certain food, you may want to stop eating that food to see if your symptoms get better. · Do not smoke or chew tobacco. Smoking can make GERD worse. If you need help quitting, talk to your doctor about stop-smoking programs and medicines. These can increase your chances of quitting for good.   · If you have GERD symptoms at night, raise the head of your bed 6 to 8 inches by putting the frame on blocks or placing a foam wedge under the head of your mattress. (Adding extra pillows does not work.)  · Do not wear tight clothing around your middle. · Lose weight if you need to. Losing just 5 to 10 pounds can help. When should you call for help? Call your doctor now or seek immediate medical care if:    · You have new or different belly pain.     · Your stools are black and tarlike or have streaks of blood. Watch closely for changes in your health, and be sure to contact your doctor if:    · Your symptoms have not improved after 2 days.     · Food seems to catch in your throat or chest.   Where can you learn more? Go to http://www.gray.com/  Enter T010 in the search box to learn more about \"Gastroesophageal Reflux Disease (GERD): Care Instructions. \"  Current as of: April 15, 2020               Content Version: 12.8  © 2006-2021 [employer]wise[employer] Inc[employer]. Care instructions adapted under license by [employer] (which disclaims liability or warranty for this information). If you have questions about a medical condition or this instruction, always ask your healthcare professional. [address] any warranty or liability for your use of this information.

## 2021-08-11 NOTE — PROGRESS NOTES
Mariela Catherine is a 78 y.o. female     Chief Complaint   Patient presents with    Annual Wellness Visit     medicare wellness/cpe       Visit Vitals  /74 (BP 1 Location: Left arm, BP Patient Position: Sitting, BP Cuff Size: Adult)   Pulse 61   Temp 97.2 °F (36.2 °C) (Temporal)   Resp 16   Ht 4' 11\" (1.499 m)   Wt 123 lb (55.8 kg)   SpO2 98%   BMI 24.84 kg/m²       Health Maintenance Due   Topic Date Due    Shingrix Vaccine Age 49> (2 of 2) 07/11/2019    Lipid Screen  06/10/2021    Medicare Yearly Exam  06/11/2021       1. Have you been to the ER, urgent care clinic since your last visit? Hospitalized since your last visit? NO    2. Have you seen or consulted any other health care providers outside of the [address] [place] [address] since your last visit? Include any pap smears or colon screening. NO

## 2021-08-12 LAB
25(OH)D3 SERPL-MCNC: 40.4 NG/ML (ref 30–100)
ALBUMIN SERPL-MCNC: 3.8 G/DL (ref 3.5–5)
ALBUMIN/GLOB SERPL: 1.2 {RATIO} (ref 1.1–2.2)
ALP SERPL-CCNC: 72 U/L (ref 45–117)
ALT SERPL-CCNC: 26 U/L (ref 12–78)
ANION GAP SERPL CALC-SCNC: 3 MMOL/L (ref 5–15)
AST SERPL-CCNC: 25 U/L (ref 15–37)
BASOPHILS # BLD: 0 K/UL (ref 0–0.1)
BASOPHILS NFR BLD: 1 % (ref 0–1)
BILIRUB SERPL-MCNC: 0.6 MG/DL (ref 0.2–1)
BUN SERPL-MCNC: 22 MG/DL (ref 6–20)
BUN/CREAT SERPL: 24 (ref 12–20)
CALCIUM SERPL-MCNC: 10 MG/DL (ref 8.5–10.1)
CHLORIDE SERPL-SCNC: 105 MMOL/L (ref 97–108)
CHOLEST SERPL-MCNC: 235 MG/DL
CO2 SERPL-SCNC: 30 MMOL/L (ref 21–32)
CREAT SERPL-MCNC: 0.93 MG/DL (ref 0.55–1.02)
DIFFERENTIAL METHOD BLD: NORMAL
EOSINOPHIL # BLD: 0.1 K/UL (ref 0–0.4)
EOSINOPHIL NFR BLD: 3 % (ref 0–7)
ERYTHROCYTE [DISTWIDTH] IN BLOOD BY AUTOMATED COUNT: 14.3 % (ref 11.5–14.5)
EST. AVERAGE GLUCOSE BLD GHB EST-MCNC: 88 MG/DL
GLOBULIN SER CALC-MCNC: 3.1 G/DL (ref 2–4)
GLUCOSE SERPL-MCNC: 91 MG/DL (ref 65–100)
HBA1C MFR BLD: 4.7 % (ref 4–5.6)
HCT VFR BLD AUTO: 42.9 % (ref 35–47)
HDLC SERPL-MCNC: 87 MG/DL
HDLC SERPL: 2.7 {RATIO} (ref 0–5)
HGB BLD-MCNC: 14 G/DL (ref 11.5–16)
IMM GRANULOCYTES # BLD AUTO: 0 K/UL (ref 0–0.04)
IMM GRANULOCYTES NFR BLD AUTO: 0 % (ref 0–0.5)
LDLC SERPL CALC-MCNC: 113 MG/DL (ref 0–100)
LYMPHOCYTES # BLD: 1.3 K/UL (ref 0.8–3.5)
LYMPHOCYTES NFR BLD: 27 % (ref 12–49)
MCH RBC QN AUTO: 31.9 PG (ref 26–34)
MCHC RBC AUTO-ENTMCNC: 32.6 G/DL (ref 30–36.5)
MCV RBC AUTO: 97.7 FL (ref 80–99)
MONOCYTES # BLD: 0.4 K/UL (ref 0–1)
MONOCYTES NFR BLD: 9 % (ref 5–13)
NEUTS SEG # BLD: 2.9 K/UL (ref 1.8–8)
NEUTS SEG NFR BLD: 60 % (ref 32–75)
NRBC # BLD: 0 K/UL (ref 0–0.01)
NRBC BLD-RTO: 0 PER 100 WBC
PLATELET # BLD AUTO: 234 K/UL (ref 150–400)
PMV BLD AUTO: 10.4 FL (ref 8.9–12.9)
POTASSIUM SERPL-SCNC: 5.4 MMOL/L (ref 3.5–5.1)
PROT SERPL-MCNC: 6.9 G/DL (ref 6.4–8.2)
RBC # BLD AUTO: 4.39 M/UL (ref 3.8–5.2)
SODIUM SERPL-SCNC: 138 MMOL/L (ref 136–145)
T4 FREE SERPL-MCNC: 1 NG/DL (ref 0.8–1.5)
TRIGL SERPL-MCNC: 175 MG/DL (ref ?–150)
TSH SERPL DL<=0.05 MIU/L-ACNC: 1.33 UIU/ML (ref 0.36–3.74)
VLDLC SERPL CALC-MCNC: 35 MG/DL
WBC # BLD AUTO: 4.8 K/UL (ref 3.6–11)

## 2021-08-17 ENCOUNTER — TELEPHONE (OUTPATIENT)
Dept: INTERNAL MEDICINE CLINIC | Age: 81
End: 2021-08-17

## 2021-08-17 NOTE — TELEPHONE ENCOUNTER
Ms. Joseph Nascimento returned Braxton's call and stated that Braxton can call her home phone or cell phone.           Thank you,  Renita Golden

## 2021-09-24 NOTE — PROGRESS NOTES
Nadine comes to the office today complaining of epigastric abdominal pain, been bothering her for about three weeks. She describes the pain as severe. It radiates through to her back. Occasionally it is associated with eating, but it can occur spontaneously. She's nauseated, but has not vomited. Her bowel movements are normal.    Physical Examination:  GENERAL:  WT: 121, down 6 pounds from October 25th. BP: 126/75. T: 98.4. HEENT:  Unremarkable. Sclerae anicteric. LUNGS:  Clear. HEART:  Regular rhythm, no murmurs or gallops. ABDOMEN:  Flat, soft, completely non tender, without detectable hepatosplenomegaly, masses, bruits or ascites. Bowel sounds are present and normally active. EXTREMITIES:  Without edema. Impression:  1. Paroxysmal epigastric pain and weight loss, etiology undetermined. Plan:  1. Check CBC, CMP, lipase and amylase. 2. We have arranged for the CT of the abdomen and pelvis to be done tomorrow with oral and IV contrast.  I'll call with the results and make further recommendations and arrange for follow up. refused

## 2021-10-06 RX ORDER — PANTOPRAZOLE SODIUM 40 MG/1
TABLET, DELAYED RELEASE ORAL
Qty: 90 TABLET | Refills: 3 | Status: SHIPPED | OUTPATIENT
Start: 2021-10-06 | End: 2021-12-05

## 2021-10-12 ENCOUNTER — TELEPHONE (OUTPATIENT)
Dept: INTERNAL MEDICINE CLINIC | Age: 81
End: 2021-10-12

## 2021-10-12 NOTE — TELEPHONE ENCOUNTER
I returned Ms. June Palumbo call from yesterday. She asked about the flu vaccine and I left a message letting her know that we are giving the flu vaccine in office and to be scheduled to give us a call.       --Vania Valladares

## 2021-10-26 ENCOUNTER — OFFICE VISIT (OUTPATIENT)
Dept: INTERNAL MEDICINE CLINIC | Age: 81
End: 2021-10-26
Payer: MEDICARE

## 2021-10-26 VITALS
DIASTOLIC BLOOD PRESSURE: 72 MMHG | HEART RATE: 64 BPM | OXYGEN SATURATION: 96 % | SYSTOLIC BLOOD PRESSURE: 122 MMHG | HEIGHT: 59 IN | RESPIRATION RATE: 16 BRPM | BODY MASS INDEX: 25.38 KG/M2 | TEMPERATURE: 97.1 F | WEIGHT: 125.9 LBS

## 2021-10-26 DIAGNOSIS — M54.42 ACUTE LEFT-SIDED LOW BACK PAIN WITH LEFT-SIDED SCIATICA: Primary | ICD-10-CM

## 2021-10-26 PROCEDURE — G8399 PT W/DXA RESULTS DOCUMENT: HCPCS | Performed by: NURSE PRACTITIONER

## 2021-10-26 PROCEDURE — 99213 OFFICE O/P EST LOW 20 MIN: CPT | Performed by: NURSE PRACTITIONER

## 2021-10-26 PROCEDURE — 1090F PRES/ABSN URINE INCON ASSESS: CPT | Performed by: NURSE PRACTITIONER

## 2021-10-26 PROCEDURE — G8536 NO DOC ELDER MAL SCRN: HCPCS | Performed by: NURSE PRACTITIONER

## 2021-10-26 PROCEDURE — G8432 DEP SCR NOT DOC, RNG: HCPCS | Performed by: NURSE PRACTITIONER

## 2021-10-26 PROCEDURE — G8419 CALC BMI OUT NRM PARAM NOF/U: HCPCS | Performed by: NURSE PRACTITIONER

## 2021-10-26 PROCEDURE — 1101F PT FALLS ASSESS-DOCD LE1/YR: CPT | Performed by: NURSE PRACTITIONER

## 2021-10-26 PROCEDURE — G8427 DOCREV CUR MEDS BY ELIG CLIN: HCPCS | Performed by: NURSE PRACTITIONER

## 2021-10-26 RX ORDER — PREDNISONE 10 MG/1
TABLET ORAL
Qty: 21 TABLET | Refills: 0 | Status: SHIPPED | OUTPATIENT
Start: 2021-10-26 | End: 2022-10-18

## 2021-10-26 NOTE — PROGRESS NOTES
George Gifford is a 78 y.o. female     Chief Complaint   Patient presents with    Knee Pain     left knee pain    Leg Pain     left leg pain       Visit Vitals  /72 (BP 1 Location: Left arm, BP Patient Position: Sitting, BP Cuff Size: Adult)   Pulse 64   Temp 97.1 °F (36.2 °C) (Temporal)   Resp 16   Ht 4' 11\" (1.499 m)   Wt 125 lb 14.4 oz (57.1 kg)   SpO2 96%   BMI 25.43 kg/m²       Health Maintenance Due   Topic Date Due    Shingrix Vaccine Age 50> (2 of 2) 07/11/2019       1. Have you been to the ER, urgent care clinic since your last visit? Hospitalized since your last visit? No     2. Have you seen or consulted any other health care providers outside of the [address] [place] [address] since your last visit? Include any pap smears or colon screening.  No

## 2021-10-27 NOTE — PATIENT INSTRUCTIONS
Back Pain: Care Instructions  Your Care Instructions     Back pain has many possible causes. It is often related to problems with muscles and ligaments of the back. It may also be related to problems with the nerves, discs, or bones of the back. Moving, lifting, standing, sitting, or sleeping in an awkward way can strain the back. Sometimes you don't notice the injury until later. Arthritis is another common cause of back pain. Although it may hurt a lot, back pain usually improves on its own within several weeks. Most people recover in 12 weeks or less. Using good home treatment and being careful not to stress your back can help you feel better sooner. Follow-up care is a key part of your treatment and safety. Be sure to make and go to all appointments, and call your doctor if you are having problems. It's also a good idea to know your test results and keep a list of the medicines you take. How can you care for yourself at home? · Sit or lie in positions that are most comfortable and reduce your pain. Try one of these positions when you lie down:  ? Lie on your back with your knees bent and supported by large pillows. ? Lie on the floor with your legs on the seat of a sofa or chair. ? Lie on your side with your knees and hips bent and a pillow between your legs. ? Lie on your stomach if it does not make pain worse. · Do not sit up in bed, and avoid soft couches and twisted positions. Bed rest can help relieve pain at first, but it delays healing. Avoid bed rest after the first day of back pain. · Change positions every 30 minutes. If you must sit for long periods of time, take breaks from sitting. Get up and walk around, or lie in a comfortable position. · Try using a heating pad on a low or medium setting for 15 to 20 minutes every 2 or 3 hours. Try a warm shower in place of one session with the heating pad. · You can also try an ice pack for 10 to 15 minutes every 2 to 3 hours.  Put a thin cloth between the ice pack and your skin. · Take pain medicines exactly as directed. ? If the doctor gave you a prescription medicine for pain, take it as prescribed. ? If you are not taking a prescription pain medicine, ask your doctor if you can take an over-the-counter medicine. · Take short walks several times a day. You can start with 5 to 10 minutes, 3 or 4 times a day, and work up to longer walks. Walk on level surfaces and avoid hills and stairs until your back is better. · Return to work and other activities as soon as you can. Continued rest without activity is usually not good for your back. · To prevent future back pain, do exercises to stretch and strengthen your back and stomach. Learn how to use good posture, safe lifting techniques, and proper body mechanics. When should you call for help? Call your doctor now or seek immediate medical care if:    · You have new or worsening numbness in your legs.     · You have new or worsening weakness in your legs. (This could make it hard to stand up.)     · You lose control of your bladder or bowels. Watch closely for changes in your health, and be sure to contact your doctor if:    · You have a fever, lose weight, or don't feel well.     · You do not get better as expected. Where can you learn more? Go to http://www.ramirez.com/  Enter I594 in the search box to learn more about \"Back Pain: Care Instructions. \"  Current as of: July 1, 2021               Content Version: 13.0  © 9291-5857 [employer]. Care instructions adapted under license by [email or web address] (which disclaims liability or warranty for this information). If you have questions about a medical condition or this instruction, always ask your healthcare professional. [place] [address] any warranty or liability for your use of this information.

## 2021-10-27 NOTE — PROGRESS NOTES
Subjective:  Ms. Fifi Clark is a pleasant 77-year-old lady, who comes in today for evaluation of left leg pain. Further discussion with her revealed that approximately ten days ago, as she was coming down the steps, she missed her last step and fell forward. She did hit her left knee to the ground and sustained some bruising, however her knee is not really bothering her. What she has now is pain that comes all the way down her left thigh and lateral border of her left calf. She denies any numbness or tingling. However, she has a sensation that her leg is giving out and she is feeling some weakness. She has had previous lumbar fusion from L3-L5. She had done well from that standpoint. She does have some mild low back pain. She denies any bowel or bladder difficulties. Her pain has not responded to OTC NSAIDs.     Past Medical History:   Diagnosis Date    Acute recurrent pansinusitis 2/23/2018    Annual physical exam 8/3/2017    Anxiety 8/3/2017    Arthralgia 8/3/2017    Arthritis     Arthritis of right hip 8/3/2017    Arthritis, hip 8/3/2017    Autoimmune disease ([address]     psoriasis    Avascular necrosis of femoral head ([address] left 8/3/2017    Bone loss 8/3/2017    Cancer (HCC)     endometrial    Chronic eczematous otitis externa of both ears 8/24/2017    Contusion of left knee, initial encounter 8/3/2017    Degenerative arthritis of lumbar spine 8/3/2017    Depression     Elevated liver function tests 8/3/2017    MIAN (generalized anxiety disorder) 8/3/2017    GERD (gastroesophageal reflux disease)     Glaucoma     Glaucoma 8/3/2017    Headache, acute 8/3/2017    Herpes zoster without complication 09/72/5994    History of colonoscopy with polypectomy 8/3/2017    History of endometrial cancer 8/3/2017    Hyperlipidemia 8/3/2017    Hypothyroid 8/3/2017    IBS (irritable bowel syndrome) 8/3/2017    AMPARO (iron deficiency anemia) 8/3/2017    Insomnia 8/3/2017    Labral tear of hip, degenerative 10/24/2017    Long-term use of immunosuppressant medication 8/3/2017    Medication side effect 8/3/2017    Melena 8/3/2017    Monilial vaginitis 8/24/2017    Muscle spasm 8/3/2017    Osteoporosis 8/3/2017    Other ill-defined conditions(799.89)     high cholesterol    Other ill-defined conditions(799.89)     psoriasis    Post-menopausal 8/3/2017    Psoriasis 8/3/2017    Pyuria, sterile 8/3/2017    Rash 8/3/2017    Rosacea blepharoconjunctivitis 8/3/2017    Sciatica 8/3/2017    Somatic dysfunction of cervical region 8/3/2017    Spondylosis of cervical region without myelopathy or radiculopathy 8/3/2017    Thrush 8/3/2017    Thyroid disease     Urticaria 8/3/2017     Past Surgical History:   Procedure Laterality Date    COLONOSCOPY N/A 6/12/2019    COLON[facility] performed [facility] Con[facility]  6/12/2019         COLORECTAL SCRN; HI RISK IND  4/2/2014         HX APPENDECTOMY      HX GYN      hysterectomy    HX HEENT      bilateral ear    HX HEENT      LASIK    HX ORTHOPAEDIC  1-2011    lumbar laminectomy    HX OTHER SURGICAL Bilateral     cataract extraction with lens implant    HX TONSILLECTOMY      NEUROLOGICAL PROCEDURE UNLISTED  2011    spinal fusion       Current Outpatient Medications on File Prior to Visit   Medication Sig Dispense Refill    pantoprazole (PROTONIX) 40 mg tablet TAKE 1 TABLET BY MOUTH DAILY 90 Tablet 3    folic acid (FOLVITE) 1 mg tablet Take 1 Tablet by mouth daily. 180 Tablet 2    estrogens, conjugated, (Premarin) 0.45 mg tablet Take 1 Tablet by mouth daily.  levothyroxine (SYNTHROID) 88 mcg tablet TAKE 1 TABLET BY MOUTH DAILY 90 Tablet 3    escitalopram oxalate (LEXAPRO) 20 mg tablet TAKE 1 TABLET BY MOUTH DAILY 90 Tablet 3    diphenoxylate-atropine (LomotiL) 2.5-0.025 mg per tablet Take 1 Tablet by mouth four (4) times daily as needed for Diarrhea. Max Daily Amount: 4 Tablets.  36 Tablet 0    LORazepam (Ativan) 0.5 mg tablet Take 1 Tablet by mouth nightly as needed (sleep). Max Daily Amount: 0.5 mg. 30 Tablet 0    dicyclomine (BENTYL) 10 mg capsule TAKE 1 CAPSULE BY MOUTH 4 TIMES A  Cap 3    loratadine (CLARITIN PO) Take  by mouth.  cyclobenzaprine (FLEXERIL) 10 mg tablet Take 1 Tab by mouth three (3) times daily as needed for Muscle Spasm(s) (neck pain). 60 Tab 0    dorzolamide-timolol (COSOPT) 22.3-6.8 mg/mL ophthalmic solution Cosopt 22.3 mg-6.8 mg/mL eye drops   Prescribed by non [facility] MD      clobetasol (TEMOVATE) 0.05 % ointment two (2) times daily as needed.  travoprost (TRAVATAN Z) 0.004 % ophthalmic solution Administer 1 Drop to both eyes nightly.  MAGNESIUM PO Take 500 mg by mouth daily.  Lactobacillus acidophilus (PROBIOTIC PO) Take  by mouth. (Patient not taking: Reported on 7/13/2021)       No current facility-administered medications on file prior to visit. Allergies   Allergen Reactions    Cephalosporins Shortness of Breath    Codeine Nausea and Vomiting    Pcn [Penicillins] Rash   Physical Examination:  GENERAL:  Pleasant, thin lady in no acute distress. She is alert and oriented. She answers my questions appropriately. She ambulates without any assistive devices. VITALS:  Blood Pressure:  122/72. Pulse:  64.  Respirations:  16.  Temperature: 97.1. O2 sat:  96. HEENT:  Normocephalic, atraumatic. NECK:  Supple without adenopathy. CHEST:  Lungs clear to auscultation, no rales or wheezes. CV:  Heart regular rhythm without murmur. EXTREMITIES:  No edema or calf tenderness. She has some bruising in her left knee, however she has full range of motion without pain. No instability. Purnima testing and drawer sign are negative. BACK:  She does have pain on hyperextension and lateral bending. No pain on percussion of the lumbar spine. Straight leg raises negative bilaterally. She has full range of motion of both hips.   She had excellent strength in the lower extremities against resistance. Sensation is preserved. Studies: Three views of the lumbar spine reveal previous L3-5 fusion. Hardware is intact. There were some degenerative changes at L1-2 and L2-3. No fractures. Impression:  1. Low back pain with left leg radiculopathy. Plan:  1. It was opted to start her on a steroid pack. 2. She does have some Cyclobenzaprine at home, so I have asked her to at least take at bedtime. 3. She may use heat alternating with ice. 4. She is not to do any heavy lifting until her symptoms improve. 5. Should her symptoms fail to improve, we will consider a short course of physical therapy or referral back to her neurosurgeon. She is fully agreeable.

## 2021-12-05 RX ORDER — PANTOPRAZOLE SODIUM 40 MG/1
TABLET, DELAYED RELEASE ORAL
Qty: 90 TABLET | Refills: 3 | Status: SHIPPED | OUTPATIENT
Start: 2021-12-05 | End: 2022-11-03

## 2022-03-18 PROBLEM — H10.829 ROSACEA BLEPHAROCONJUNCTIVITIS: Status: ACTIVE | Noted: 2017-08-03

## 2022-03-18 PROBLEM — Z79.60 LONG-TERM USE OF IMMUNOSUPPRESSANT MEDICATION: Status: ACTIVE | Noted: 2017-08-03

## 2022-03-18 PROBLEM — R10.13 EPIGASTRIC PAIN: Status: ACTIVE | Noted: 2017-11-27

## 2022-03-19 PROBLEM — G47.00 INSOMNIA: Status: ACTIVE | Noted: 2017-08-03

## 2022-03-19 PROBLEM — L40.9 PSORIASIS: Status: ACTIVE | Noted: 2017-08-03

## 2022-03-19 PROBLEM — E78.5 DYSLIPIDEMIA: Status: ACTIVE | Noted: 2017-08-24

## 2022-03-19 PROBLEM — E03.9 HYPOTHYROID: Status: ACTIVE | Noted: 2017-08-03

## 2022-03-19 PROBLEM — F41.1 GAD (GENERALIZED ANXIETY DISORDER): Status: ACTIVE | Noted: 2017-08-03

## 2022-03-19 PROBLEM — D50.9 IDA (IRON DEFICIENCY ANEMIA): Status: ACTIVE | Noted: 2017-08-03

## 2022-03-20 PROBLEM — F41.9 ANXIETY: Status: ACTIVE | Noted: 2017-08-03

## 2022-03-20 PROBLEM — K21.9 GERD (GASTROESOPHAGEAL REFLUX DISEASE): Status: ACTIVE | Noted: 2017-11-27

## 2022-03-20 PROBLEM — K58.9 IBS (IRRITABLE BOWEL SYNDROME): Status: ACTIVE | Noted: 2017-08-03

## 2022-04-20 ENCOUNTER — TRANSCRIBE ORDER (OUTPATIENT)
Dept: SCHEDULING | Age: 82
End: 2022-04-20

## 2022-04-20 DIAGNOSIS — M54.16 LUMBAR RADICULOPATHY: Primary | ICD-10-CM

## 2022-05-05 ENCOUNTER — HOSPITAL ENCOUNTER (OUTPATIENT)
Dept: MRI IMAGING | Age: 82
Discharge: HOME | End: 2022-05-05
Attending: SPECIALIST
Payer: MEDICARE

## 2022-05-05 ENCOUNTER — HOSPITAL ENCOUNTER (OUTPATIENT)
Dept: GENERAL RADIOLOGY | Age: 82
Discharge: HOME | End: 2022-05-05
Attending: SPECIALIST
Payer: MEDICARE

## 2022-05-05 ENCOUNTER — TRANSCRIBE ORDER (OUTPATIENT)
Dept: REGISTRATION | Age: 82
End: 2022-05-05

## 2022-05-05 DIAGNOSIS — M54.16 LUMBAR RADICULOPATHY: ICD-10-CM

## 2022-05-05 DIAGNOSIS — M54.16 LUMBAR RADICULOPATHY: Primary | ICD-10-CM

## 2022-05-05 PROCEDURE — 74011250636 HC RX REV CODE- 250/636: Performed by: SPECIALIST

## 2022-05-05 PROCEDURE — 72158 MRI LUMBAR SPINE W/O & W/DYE: CPT

## 2022-05-05 PROCEDURE — 72110 X-RAY EXAM L-2 SPINE 4/>VWS: CPT

## 2022-05-05 PROCEDURE — A9576 INJ PROHANCE MULTIPACK: HCPCS | Performed by: SPECIALIST

## 2022-05-05 RX ADMIN — GADOTERIDOL 11 ML: 279.3 INJECTION, SOLUTION INTRAVENOUS at 16:29

## 2022-09-19 ENCOUNTER — TRANSCRIBE ORDER (OUTPATIENT)
Dept: SCHEDULING | Age: 82
End: 2022-09-19

## 2022-09-19 DIAGNOSIS — M54.12 CERVICAL RADICULOPATHY: ICD-10-CM

## 2022-09-19 DIAGNOSIS — M50.30 DDD (DEGENERATIVE DISC DISEASE), CERVICAL: ICD-10-CM

## 2022-09-19 DIAGNOSIS — M54.2 CERVICAL PAIN: Primary | ICD-10-CM

## 2022-09-27 ENCOUNTER — HOSPITAL ENCOUNTER (OUTPATIENT)
Dept: MRI IMAGING | Age: 82
Discharge: HOME | End: 2022-09-27
Attending: NURSE PRACTITIONER
Payer: MEDICARE

## 2022-09-27 DIAGNOSIS — M54.12 CERVICAL RADICULOPATHY: ICD-10-CM

## 2022-09-27 DIAGNOSIS — M54.2 CERVICAL PAIN: ICD-10-CM

## 2022-09-27 DIAGNOSIS — M50.30 DDD (DEGENERATIVE DISC DISEASE), CERVICAL: ICD-10-CM

## 2022-09-27 PROCEDURE — 72141 MRI NECK SPINE W/O DYE: CPT

## 2022-10-18 RX ORDER — PREDNISONE 10 MG/1
TABLET ORAL
Qty: 48 TABLET | Refills: 0 | Status: SHIPPED | OUTPATIENT
Start: 2022-10-18 | End: 2022-10-19 | Stop reason: ALTCHOICE

## 2022-10-18 RX ORDER — FOLIC ACID 1 MG/1
TABLET ORAL
Qty: 180 TABLET | Refills: 2 | Status: SHIPPED | OUTPATIENT
Start: 2022-10-18

## 2022-10-19 PROBLEM — F41.9 ANXIETY: Status: RESOLVED | Noted: 2017-08-03 | Resolved: 2022-10-19

## 2022-10-19 PROBLEM — M15.0 PRIMARY OSTEOARTHRITIS INVOLVING MULTIPLE JOINTS: Status: ACTIVE | Noted: 2022-10-19

## 2022-10-19 PROBLEM — M15.9 PRIMARY OSTEOARTHRITIS INVOLVING MULTIPLE JOINTS: Status: ACTIVE | Noted: 2022-10-19

## 2022-10-19 PROBLEM — F32.A DEPRESSION: Status: ACTIVE | Noted: 2022-10-19

## 2022-10-19 PROBLEM — D50.9 IRON DEFICIENCY ANEMIA: Status: ACTIVE | Noted: 2017-08-03

## 2022-10-19 PROBLEM — D50.9 IDA (IRON DEFICIENCY ANEMIA): Status: RESOLVED | Noted: 2017-08-03 | Resolved: 2022-10-19

## 2022-10-19 PROBLEM — E03.9 ACQUIRED HYPOTHYROIDISM: Status: ACTIVE | Noted: 2017-08-03

## 2022-10-25 PROBLEM — Z00.00 MEDICARE ANNUAL WELLNESS VISIT, SUBSEQUENT: Status: ACTIVE | Noted: 2022-10-25

## 2022-10-25 PROBLEM — E55.9 VITAMIN D DEFICIENCY: Status: ACTIVE | Noted: 2022-10-25

## 2022-10-25 PROBLEM — Z13.39 ALCOHOL SCREENING: Status: ACTIVE | Noted: 2017-08-03

## 2022-10-25 NOTE — PROGRESS NOTES
This is a Subsequent Medicare Annual Wellness Visit providing Personalized Prevention Plan Services (PPPS) (Performed 12 months after initial AWV and PPPS )    I have reviewed the patient's medical history in detail and updated the computerized patient record. She presents today as a new patient to me for her Medicare subsequent annual wellness examination screening questionnaire. She is here in follow-up previously seen by the nurse practitioner here up for medical problems to include GERD, IBS, hypothyroidism, DJD with cervical DJD and previous lumbar laminectomy, generalized anxiety, history of iron deficiency anemia, insomnia, history of depression, dyslipidemia and other multiple medical problems. She currently denies any chest pain, shortness of breath, palpitations, PND, orthopnea or other cardiac or respiratory complaints. She notes no current GI or  complaints. She notes no headaches, dizziness or neurologic complaints. She does have some arthritic complaints in her neck and was recent seen by neurosurgery at which time she was told that there was nothing they could do surgically for her. She was set up for epidural cortisone shots and has had the first 1 and is getting ready to start physical therapy and has plans for a second epidural cortisone shot. She thinks after having a shot about a month ago it is helping a little. She has no other complaints on complete view of systems.     History     Past Medical History:   Diagnosis Date    Acute recurrent pansinusitis 2/23/2018    Annual physical exam 8/3/2017    Anxiety 8/3/2017    Arthralgia 8/3/2017    Arthritis     Arthritis of right hip 8/3/2017    Arthritis, hip 8/3/2017    Autoimmune disease ([address]     psoriasis    Avascular necrosis of femoral head ([address] left 8/3/2017    Bone loss 8/3/2017    Cancer ([address]     endometrial    Chronic eczematous otitis externa of both ears 8/24/2017    Contusion of left knee, initial encounter 8/3/2017 Degenerative arthritis of lumbar spine 8/3/2017    Depression     Elevated liver function tests 8/3/2017    MIAN (generalized anxiety disorder) 8/3/2017    GERD (gastroesophageal reflux disease)     Glaucoma     Glaucoma 8/3/2017    Headache, acute 8/3/2017    Herpes zoster without complication 38/96/3758    History of colonoscopy with polypectomy 8/3/2017    History of endometrial cancer 8/3/2017    Hyperlipidemia 8/3/2017    Hypothyroid 8/3/2017    IBS (irritable bowel syndrome) 8/3/2017    AMPARO (iron deficiency anemia) 8/3/2017    Insomnia 8/3/2017    Labral tear of hip, degenerative 10/24/2017    Long-term use of immunosuppressant medication 8/3/2017    Medication side effect 8/3/2017    Melena 8/3/2017    Monilial vaginitis 8/24/2017    Muscle spasm 8/3/2017    Osteoporosis 8/3/2017    Other ill-defined conditions(799.89)     high cholesterol    Other ill-defined conditions(799.89)     psoriasis    Post-menopausal 8/3/2017    Psoriasis 8/3/2017    Pyuria, sterile 8/3/2017    Rash 8/3/2017    Rosacea blepharoconjunctivitis 8/3/2017    Sciatica 8/3/2017    Somatic dysfunction of cervical region 8/3/2017    Spondylosis of cervical region without myelopathy or radiculopathy 8/3/2017    Thrush 8/3/2017    Thyroid disease     Urticaria 8/3/2017      Past Surgical History:   Procedure Laterality Date    COLONOSCOPY N/A 6/12/2019    COLONOSCOPY performed by Kellie Story MD at [facility] ENDOSCOPY    COLONOSCOPY,DIAGNOSTIC  6/12/2019         COLORECTAL SCRN; HI RISK IND  4/2/2014         HX APPENDECTOMY      HX GYN      hysterectomy    HX HEENT      bilateral ear    HX HEENT      LASIK    HX ORTHOPAEDIC  1-2011    lumbar laminectomy    HX OTHER SURGICAL Bilateral     cataract extraction with lens implant    HX TONSILLECTOMY      NEUROLOGICAL PROCEDURE UNLISTED  2011    spinal fusion     Social History     Tobacco Use    Smoking status: Former     Types: Cigarettes     Quit date: 1/17/1979     Years since quittin.8    Smokeless tobacco: Never   Vaping Use    Vaping Use: Never used   Substance Use Topics    Alcohol use: Yes     Alcohol/week: 7.0 standard drinks     Types: 7 Glasses of wine per week    Drug use: No     Current Outpatient Medications   Medication Sig Dispense Refill    acetaminophen (Tylenol Extra Strength) 500 mg tablet Take 500 mg by mouth every six (6) hours as needed for Pain.      estradioL (ESTRACE) 0.5 mg tablet Take 0.5 mg by mouth daily. meloxicam (MOBIC) 15 mg tablet Take 15 mg by mouth daily. pregabalin (LYRICA) 100 mg capsule Take 100 mg by mouth two (2) times a day. folic acid (FOLVITE) 1 mg tablet TAKE 2 TABLETS BY MOUTH DAILY 180 Tablet 2    pantoprazole (PROTONIX) 40 mg tablet TAKE 1 TABLET BY MOUTH DAILY 90 Tablet 3    levothyroxine (SYNTHROID) 88 mcg tablet TAKE 1 TABLET BY MOUTH DAILY 90 Tablet 3    escitalopram oxalate (LEXAPRO) 20 mg tablet TAKE 1 TABLET BY MOUTH DAILY 90 Tablet 3    diphenoxylate-atropine (LomotiL) 2.5-0.025 mg per tablet Take 1 Tablet by mouth four (4) times daily as needed for Diarrhea. Max Daily Amount: 4 Tablets. 40 Tablet 0    LORazepam (Ativan) 0.5 mg tablet Take 1 Tablet by mouth nightly as needed (sleep). Max Daily Amount: 0.5 mg. 30 Tablet 0    dicyclomine (BENTYL) 10 mg capsule TAKE 1 CAPSULE BY MOUTH 4 TIMES A  Cap 3    loratadine (CLARITIN PO) Take  by mouth. cyclobenzaprine (FLEXERIL) 10 mg tablet Take 1 Tab by mouth three (3) times daily as needed for Muscle Spasm(s) (neck pain). 60 Tab 0    dorzolamide-timoloL (COSOPT) 22.3-6.8 mg/mL ophthalmic solution Cosopt 22.3 mg-6.8 mg/mL eye drops   Prescribed by non 606/706 Marion Del Rosario MD      clobetasol (TEMOVATE) 0.05 % ointment two (2) times daily as needed. travoprost (TRAVATAN Z) 0.004 % ophthalmic solution Administer 1 Drop to both eyes nightly. MAGNESIUM PO Take 500 mg by mouth daily. estrogens, conjugated, (PREMARIN) 0.45 mg tablet Take 1 Tablet by mouth daily.  (Patient not taking: Reported on 10/26/2022)       Allergies   Allergen Reactions    Cephalosporins Shortness of Breath    Codeine Nausea and Vomiting    Pcn [Penicillins] Rash     Family History   Problem Relation Age of Onset    Heart Disease Mother     Other Mother         kidney stones    Diabetes Father     Cancer Sister         colon cancer    Heart Disease Brother     Diabetes Brother     Other Brother         kidney stones    Colon Cancer Brother     Heart Disease Brother     Other Brother         kidney stones    Heart Disease Brother     Heart Disease Brother     Cancer Brother         melanoma       Patient Active Problem List    Diagnosis    Primary osteoarthritis involving multiple joints    GERD (gastroesophageal reflux disease)    Dyslipidemia    Acquired hypothyroidism    IBS (irritable bowel syndrome)    Depression    MIAN (generalized anxiety disorder)    Vitamin D deficiency    Medicare annual wellness visit, subsequent    Epigastric pain    DJD (degenerative joint disease) of cervical spine    Alcohol screening    Long-term use of immunosuppressant medication    Iron deficiency anemia    Rosacea blepharoconjunctivitis    Insomnia    Psoriasis       Patient Care Team:  Michel Miranda NP as PCP - General (Nurse Practitioner)  Lyudmila Mchugh NP as PCP - INTERNAL MEDICINE (Nurse Practitioner)    Depression Risk Factor Screening:     3 most recent PHQ Screens 10/26/2022   Little interest or pleasure in doing things Not at all   Feeling down, depressed, irritable, or hopeless Not at all   Total Score PHQ 2 0     Alcohol Risk Factor Screening:   Do you average more than 1 drink per night or more than 7 drinks a week:  No    On any one occasion in the past three months have you have had more than 3 drinks containing alcohol:  No    Functional Ability and Level of Safety:     Fall Risk     Fall Risk Assessment, last 12 mths 10/26/2022   Able to walk? Yes   Fall in past 12 months?  1   Do you feel unsteady? 1   Are you worried about falling 1   Is TUG test greater than 12 seconds? 1   Is the gait abnormal? 0   Number of falls in past 12 months 2   Fall with injury? 1       Hearing Loss   mild    Activities of Daily Living   Self-care. ADL Assessment 10/26/2022   Feeding yourself No Help Needed   Getting from bed to chair No Help Needed   Getting dressed No Help Needed   Bathing or showering No Help Needed   Walk across the room (includes cane/walker) No Help Needed   Using the telphone No Help Needed   Taking your medications No Help Needed   Preparing meals No Help Needed   Managing money (expenses/bills) No Help Needed   Moderately strenuous housework (laundry) No Help Needed   Shopping for personal items (toiletries/medicines) No Help Needed   Shopping for groceries No Help Needed   Driving No Help Needed   Climbing a flight of stairs No Help Needed   Getting to places beyond walking distances No Help Needed       Abuse Screen   Patient is not abused    Social History     Social History Narrative    Not on file       Review of Systems      ROS:    Constitutional: She denies fevers, weight loss, sweats. Eyes: No blurred or double vision. ENT: No difficulty with swallowing, taste, speech or smell. NECK: no stiffness swelling or lymph node enlargement. Positive for pain as noted above  Respiratory: No cough wheezing or shortness of breath. Cardiovascular: Denies chest pain, palpitations, unexplained indigestion or syncope. Breast: She has noted no masses or lumps and no discharge or axillary swelling  Gastrointestinal:  No changes in bowel movements, no abdominal pain, no bloating. Genitourinary: No discharge or abnormal bleeding or pain  Extremities: No joint pain, stiffness or swelling. Neurological:  No numbness, tingling, burring paresthesias or loss of motor strength. No syncope, dizziness or frequent headache  Skin:  No recent rashes or mole changes.   Psychiatric/Behavioral:  Negative for depression. The patient is not nervous/anxious. HEMATOLOGIC: no easy bruising or bleeding gums  Endocrine: no sweats of urinary frequency or excessive thirst     Physical Examination     Evaluation of Cognitive Function:  Mood/affect:  happy  Appearance: age appropriate  Family member/caregiver input: None    Vitals:    10/26/22 0955   BP: (!) 146/82   Pulse: 63   Resp: 16   Temp: 98.3 °F (36.8 °C)   TempSrc: Oral   SpO2: 97%   Weight: 123 lb 14.4 oz (56.2 kg)   Height: 4' 11\" (1.499 m)   PainSc:   0 - No pain        PHYSICAL EXAM:    General appearance - alert, well appearing, and in no distress  Mental status - alert, oriented to person, place, and time  HEENT:  Ears - bilateral TM's and external ear canals clear  Eyes - pupillary responses were normal.  Extraocular muscle function intact. Lids and conjunctiva not injected. Fundoscopic exam revealed sharp disc margins. eye movements intact  Pharynx- clear with teeth in good repair. No masses were noted  Neck - supple without thyromegaly or burit. No JVD noted  Lungs - clear to auscultation and percussion  Cardiac- normal rate, regular rhythm without murmurs. PMI not displaced. No gallop, rub or click  Breast: deferred to GYN  Abdomen - flat, soft, non-tender without palpable organomegaly or mass. No pulsatile mass was felt, and not bruit was heard. Bowel sounds were active   Female - deferred to GYN  Rectal - deferred to GYN  Extremities -  no clubbing cyanosis or edema  Lymphatics - no palpable lymphadenopathy, no hepatosplenomegaly  Peripheral vascular - Dorsalis pedis and posterior tibial pulses felt without difficulty  Skin - no rash or unusual mole change noted  Neurological - Cranial nerves II-XII grossly intact. Motor strength 5/5. DTR's 2+ and symmetric.   Station and gait normal  Back exam - full range of motion, no tenderness, palpable spasm or pain on motion  Musculoskeletal - no joint tenderness, deformity or swelling  Hematologic: no purpura, petechiae or bruising     Results for orders placed or performed in visit on 08/11/21   URINE CULTURE HOLD SAMPLE    Specimen: Serum   Result Value Ref Range    Urine culture hold        Urine on hold in Microbiology dept for 2 days. If unpreserved urine is submitted, it cannot be used for addtional testing after 24 hours, recollection will be required. VITAMIN D, 25 HYDROXY   Result Value Ref Range    Vitamin D 25-Hydroxy 40.4 30 - 100 ng/mL   TSH 3RD GENERATION   Result Value Ref Range    TSH 1.33 0.36 - 3.74 uIU/mL   T4, FREE   Result Value Ref Range    T4, Free 1.0 0.8 - 1.5 NG/DL   METABOLIC PANEL, COMPREHENSIVE   Result Value Ref Range    Sodium 138 136 - 145 mmol/L    Potassium 5.4 (H) 3.5 - 5.1 mmol/L    Chloride 105 97 - 108 mmol/L    CO2 30 21 - 32 mmol/L    Anion gap 3 (L) 5 - 15 mmol/L    Glucose 91 65 - 100 mg/dL    BUN 22 (H) 6 - 20 MG/DL    Creatinine 0.93 0.55 - 1.02 MG/DL    BUN/Creatinine ratio 24 (H) 12 - 20      GFR est AA >60 >60 ml/min/1.73m2    GFR est non-AA 58 (L) >60 ml/min/1.73m2    Calcium 10.0 8.5 - 10.1 MG/DL    Bilirubin, total 0.6 0.2 - 1.0 MG/DL    ALT (SGPT) 26 12 - 78 U/L    AST (SGOT) 25 15 - 37 U/L    Alk.  phosphatase 72 45 - 117 U/L    Protein, total 6.9 6.4 - 8.2 g/dL    Albumin 3.8 3.5 - 5.0 g/dL    Globulin 3.1 2.0 - 4.0 g/dL    A-G Ratio 1.2 1.1 - 2.2     LIPID PANEL   Result Value Ref Range    Cholesterol, total 235 (H) <200 MG/DL    Triglyceride 175 (H) <150 MG/DL    HDL Cholesterol 87 MG/DL    LDL, calculated 113 (H) 0 - 100 MG/DL    VLDL, calculated 35 MG/DL    CHOL/HDL Ratio 2.7 0.0 - 5.0     HEMOGLOBIN A1C WITH EAG   Result Value Ref Range    Hemoglobin A1c 4.7 4.0 - 5.6 %    Est. average glucose 88 mg/dL   CBC WITH AUTOMATED DIFF   Result Value Ref Range    WBC 4.8 3.6 - 11.0 K/uL    RBC 4.39 3.80 - 5.20 M/uL    HGB 14.0 11.5 - 16.0 g/dL    HCT 42.9 35.0 - 47.0 %    MCV 97.7 80.0 - 99.0 FL    MCH 31.9 26.0 - 34.0 PG    MCHC 32.6 30.0 - 36.5 g/dL    RDW 14.3 11.5 - 14.5 %    PLATELET 478 201 - 744 K/uL    MPV 10.4 8.9 - 12.9 FL    NRBC 0.0 0  WBC    ABSOLUTE NRBC 0.00 0.00 - 0.01 K/uL    NEUTROPHILS 60 32 - 75 %    LYMPHOCYTES 27 12 - 49 %    MONOCYTES 9 5 - 13 %    EOSINOPHILS 3 0 - 7 %    BASOPHILS 1 0 - 1 %    IMMATURE GRANULOCYTES 0 0.0 - 0.5 %    ABS. NEUTROPHILS 2.9 1.8 - 8.0 K/UL    ABS. LYMPHOCYTES 1.3 0.8 - 3.5 K/UL    ABS. MONOCYTES 0.4 0.0 - 1.0 K/UL    ABS. EOSINOPHILS 0.1 0.0 - 0.4 K/UL    ABS. BASOPHILS 0.0 0.0 - 0.1 K/UL    ABS. IMM. GRANS. 0.0 0.00 - 0.04 K/UL    DF AUTOMATED     URINALYSIS W/ RFLX MICROSCOPIC   Result Value Ref Range    Color YELLOW/STRAW      Appearance HAZY (A) CLEAR      Specific gravity 1.010 1.003 - 1.030      pH (UA) 7.5 5.0 - 8.0      Protein Negative Negative mg/dL    Glucose Negative Negative mg/dL    Ketone Negative Negative mg/dL    Bilirubin Negative Negative      Blood Negative Negative      Urobilinogen 0.2 0.2 - 1.0 EU/dL    Nitrites Negative Negative      Leukocyte Esterase MODERATE (A) Negative     URINE MICROSCOPIC ONLY   Result Value Ref Range    WBC 5-10 0 - 4 /hpf    RBC 0-5 0 - 5 /hpf    Epithelial cells MANY (A) FEW /lpf    Bacteria 1+ (A) Negative /hpf       Advice/Referrals/Counseling   Education and counseling provided:  Are appropriate based on today's review and evaluation  End-of-Life planning (with patient's consent)  Pneumococcal Vaccine  Influenza Vaccine  Colorectal cancer screening tests      Assessment/Plan     ASSESSMENT:   1. Dyslipidemia    2. Gastroesophageal reflux disease, unspecified whether esophagitis present    3. Irritable bowel syndrome with diarrhea    4. Primary osteoarthritis involving multiple joints    5. Acquired hypothyroidism    6. MIAN (generalized anxiety disorder)    7. Depression, unspecified depression type    8. Insomnia, unspecified type    9. Iron deficiency anemia, unspecified iron deficiency anemia type    10. Vitamin D deficiency    11. Alcohol screening    12. Medicare annual wellness visit, subsequent    13. Osteoarthritis of spine with radiculopathy, cervical region      Impression  1. Dyslipidemia we will see what that status is and make adjustments if necessary currently controlled by dietary measures  2. GERD that is stable currently on Protonix  3. IBS that is stable   4. DJD predominantly complaining of cervical spine disease, she is going through physical therapy and she does take some Tylenol  5. Hypothyroidism repeat status pending   6. Anxiety that seems to be controlled with lorazepam  7. Depression that is currently controlled  8. Insomnia currently controlled  9. History of anemia repeat status pending   10. Vitamin D deficiency status pending  11. Annual alcohol screening is done and she does claim to drink 1 glass of wine each evening around 5:00. We did have a 8-minute discussion regarding excessive alcohol intake in females who averaged more than 1 drink per day with increased cardiovascular risk and increased risk of liver disease and other GI effects. 12. DJD cervical spine as noted above  Medicare subsequent annual wellness examination screening questionnaire was completed today. The results reviewed with her and her questions were answered. Lifestyle recommendations and modifications discussed and made. 40 minutes spent in direct care of this patient today not including time spent on Medicare wellness examination or procedures. Greater than 50% in counseling and coordination of care. Follow-up with me again in 6 months or sooner if there is a problem. I will call with today's lab results.     PLAN:  .  Orders Placed This Encounter    CBC WITH AUTOMATED DIFF    METABOLIC PANEL, COMPREHENSIVE    LIPID PANEL    CK    T4 (THYROXINE)    TSH 3RD GENERATION    URINALYSIS W/ REFLEX CULTURE    VITAMIN D, 25 HYDROXY    acetaminophen (Tylenol Extra Strength) 500 mg tablet    estradioL (ESTRACE) 0.5 mg tablet    meloxicam (MOBIC) 15 mg tablet pregabalin (LYRICA) 100 mg capsule         ATTENTION:   This medical record was transcribed using an electronic medical records system. Although proofread, it may and can contain electronic and spelling errors. Other human spelling and other errors may be present. Corrections may be executed at a later time. Please feel free to contact us for any clarifications as needed. Esequiel Christina MD       Recommended healthy diet low in carbohydrates, fats, sodium and cholesterol. Recommended regular cardiovascular exercise 3-6 times per week for 30-60 minutes daily. Current Outpatient Medications   Medication Sig Dispense Refill    acetaminophen (Tylenol Extra Strength) 500 mg tablet Take 500 mg by mouth every six (6) hours as needed for Pain.      estradioL (ESTRACE) 0.5 mg tablet Take 0.5 mg by mouth daily. meloxicam (MOBIC) 15 mg tablet Take 15 mg by mouth daily. pregabalin (LYRICA) 100 mg capsule Take 100 mg by mouth two (2) times a day. folic acid (FOLVITE) 1 mg tablet TAKE 2 TABLETS BY MOUTH DAILY 180 Tablet 2    pantoprazole (PROTONIX) 40 mg tablet TAKE 1 TABLET BY MOUTH DAILY 90 Tablet 3    levothyroxine (SYNTHROID) 88 mcg tablet TAKE 1 TABLET BY MOUTH DAILY 90 Tablet 3    escitalopram oxalate (LEXAPRO) 20 mg tablet TAKE 1 TABLET BY MOUTH DAILY 90 Tablet 3    diphenoxylate-atropine (LomotiL) 2.5-0.025 mg per tablet Take 1 Tablet by mouth four (4) times daily as needed for Diarrhea. Max Daily Amount: 4 Tablets. 40 Tablet 0    LORazepam (Ativan) 0.5 mg tablet Take 1 Tablet by mouth nightly as needed (sleep). Max Daily Amount: 0.5 mg. 30 Tablet 0    dicyclomine (BENTYL) 10 mg capsule TAKE 1 CAPSULE BY MOUTH 4 TIMES A  Cap 3    loratadine (CLARITIN PO) Take  by mouth. cyclobenzaprine (FLEXERIL) 10 mg tablet Take 1 Tab by mouth three (3) times daily as needed for Muscle Spasm(s) (neck pain).  60 Tab 0    dorzolamide-timoloL (COSOPT) 22.3-6.8 mg/mL ophthalmic solution Cosopt 22.3 mg-6.8 mg/mL eye drops   Prescribed by non 606/706 Marion Del Rosario MD      clobetasol (TEMOVATE) 0.05 % ointment two (2) times daily as needed. travoprost (TRAVATAN Z) 0.004 % ophthalmic solution Administer 1 Drop to both eyes nightly. MAGNESIUM PO Take 500 mg by mouth daily. estrogens, conjugated, (PREMARIN) 0.45 mg tablet Take 1 Tablet by mouth daily. (Patient not taking: Reported on 10/26/2022)         No results found for any visits on 10/26/22. Verbal and written instructions (see AVS) provided. Patient expresses understanding of diagnosis and treatment plan.     Yadira Kelley MD

## 2022-10-26 ENCOUNTER — OFFICE VISIT (OUTPATIENT)
Dept: INTERNAL MEDICINE CLINIC | Age: 82
End: 2022-10-26
Payer: MEDICARE

## 2022-10-26 VITALS
HEART RATE: 63 BPM | BODY MASS INDEX: 24.98 KG/M2 | OXYGEN SATURATION: 97 % | SYSTOLIC BLOOD PRESSURE: 146 MMHG | HEIGHT: 59 IN | WEIGHT: 123.9 LBS | TEMPERATURE: 98.3 F | DIASTOLIC BLOOD PRESSURE: 82 MMHG | RESPIRATION RATE: 16 BRPM

## 2022-10-26 DIAGNOSIS — E78.5 DYSLIPIDEMIA: Primary | ICD-10-CM

## 2022-10-26 DIAGNOSIS — D50.9 IRON DEFICIENCY ANEMIA, UNSPECIFIED IRON DEFICIENCY ANEMIA TYPE: ICD-10-CM

## 2022-10-26 DIAGNOSIS — K58.0 IRRITABLE BOWEL SYNDROME WITH DIARRHEA: ICD-10-CM

## 2022-10-26 DIAGNOSIS — Z00.00 MEDICARE ANNUAL WELLNESS VISIT, SUBSEQUENT: ICD-10-CM

## 2022-10-26 DIAGNOSIS — F41.1 GAD (GENERALIZED ANXIETY DISORDER): ICD-10-CM

## 2022-10-26 DIAGNOSIS — G47.00 INSOMNIA, UNSPECIFIED TYPE: ICD-10-CM

## 2022-10-26 DIAGNOSIS — Z13.39 ALCOHOL SCREENING: ICD-10-CM

## 2022-10-26 DIAGNOSIS — E55.9 VITAMIN D DEFICIENCY: ICD-10-CM

## 2022-10-26 DIAGNOSIS — F32.A DEPRESSION, UNSPECIFIED DEPRESSION TYPE: ICD-10-CM

## 2022-10-26 DIAGNOSIS — M15.9 PRIMARY OSTEOARTHRITIS INVOLVING MULTIPLE JOINTS: ICD-10-CM

## 2022-10-26 DIAGNOSIS — M47.22 OSTEOARTHRITIS OF SPINE WITH RADICULOPATHY, CERVICAL REGION: ICD-10-CM

## 2022-10-26 DIAGNOSIS — E03.9 ACQUIRED HYPOTHYROIDISM: ICD-10-CM

## 2022-10-26 DIAGNOSIS — K21.9 GASTROESOPHAGEAL REFLUX DISEASE, UNSPECIFIED WHETHER ESOPHAGITIS PRESENT: ICD-10-CM

## 2022-10-26 PROBLEM — M47.812 DJD (DEGENERATIVE JOINT DISEASE) OF CERVICAL SPINE: Status: ACTIVE | Noted: 2017-08-03

## 2022-10-26 PROCEDURE — G8417 CALC BMI ABV UP PARAM F/U: HCPCS | Performed by: INTERNAL MEDICINE

## 2022-10-26 PROCEDURE — G9717 DOC PT DX DEP/BP F/U NT REQ: HCPCS | Performed by: INTERNAL MEDICINE

## 2022-10-26 PROCEDURE — 99215 OFFICE O/P EST HI 40 MIN: CPT | Performed by: INTERNAL MEDICINE

## 2022-10-26 PROCEDURE — G8536 NO DOC ELDER MAL SCRN: HCPCS | Performed by: INTERNAL MEDICINE

## 2022-10-26 PROCEDURE — G0442 ANNUAL ALCOHOL SCREEN 15 MIN: HCPCS | Performed by: INTERNAL MEDICINE

## 2022-10-26 PROCEDURE — 1123F ACP DISCUSS/DSCN MKR DOCD: CPT | Performed by: INTERNAL MEDICINE

## 2022-10-26 PROCEDURE — 1090F PRES/ABSN URINE INCON ASSESS: CPT | Performed by: INTERNAL MEDICINE

## 2022-10-26 PROCEDURE — 1101F PT FALLS ASSESS-DOCD LE1/YR: CPT | Performed by: INTERNAL MEDICINE

## 2022-10-26 PROCEDURE — G8399 PT W/DXA RESULTS DOCUMENT: HCPCS | Performed by: INTERNAL MEDICINE

## 2022-10-26 PROCEDURE — G0439 PPPS, SUBSEQ VISIT: HCPCS | Performed by: INTERNAL MEDICINE

## 2022-10-26 PROCEDURE — G8427 DOCREV CUR MEDS BY ELIG CLIN: HCPCS | Performed by: INTERNAL MEDICINE

## 2022-10-26 RX ORDER — MELOXICAM 15 MG/1
15 TABLET ORAL DAILY
COMMUNITY
Start: 2022-10-06

## 2022-10-26 RX ORDER — ACETAMINOPHEN 500 MG
500 TABLET ORAL
COMMUNITY

## 2022-10-26 RX ORDER — PREGABALIN 100 MG/1
100 CAPSULE ORAL 2 TIMES DAILY
COMMUNITY
Start: 2022-10-12

## 2022-10-26 RX ORDER — ESTRADIOL 0.5 MG/1
0.5 TABLET ORAL DAILY
COMMUNITY
Start: 2022-10-10

## 2022-10-26 NOTE — PROGRESS NOTES
Chief Complaint   Patient presents with    Annual Wellness Visit     Visit Vitals  BP (!) 146/82 (BP 1 Location: Left upper arm, BP Patient Position: Sitting, BP Cuff Size: Adult)   Pulse 63   Temp 98.3 °F (36.8 °C) (Oral)   Resp 16   Ht 4' 11\" (1.499 m)   Wt 123 lb 14.4 oz (56.2 kg)   SpO2 97%   BMI 25.02 kg/m²     1. Have you been to the ER, urgent care clinic since your last visit? Hospitalized since your last visit? No    2. Have you seen or consulted any other health care providers outside of the [address] [place] [address] since your last visit? Include any pap smears or colon screening. Dr. Kierra Wallace      Depression Risk Factor Screening:     3 most recent [place] [address] Screens 10/26/2022   Little interest or pleasure in doing things Not at all   Feeling down, depressed, irritable, or hopeless Not at all   Total Score PHQ 2 0       Functional Ability and Level of Safety:     Activities of Daily Living  ADL Assessment 10/26/2022   Feeding yourself No Help Needed   Getting from bed to chair No Help Needed   Getting dressed No Help Needed   Bathing or showering No Help Needed   Walk across the room (includes cane/walker) No Help Needed   Using the telphone No Help Needed   Taking your medications No Help Needed   Preparing meals No Help Needed   Managing money (expenses/bills) No Help Needed   Moderately strenuous housework (laundry) No Help Needed   Shopping for personal items (toiletries/medicines) No Help Needed   Shopping for groceries No Help Needed   Driving No Help Needed   Climbing a flight of stairs No Help Needed   Getting to places beyond walking distances No Help Needed       Fall Risk  Fall Risk Assessment, last 12 mths 10/26/2022   Able to walk? Yes   Fall in past 12 months? 1   Do you feel unsteady? 1   Are you worried about falling 1   Is TUG test greater than 12 seconds? 1   Is the gait abnormal? 0   Number of falls in past 12 months 2   Fall with injury?  1       Abuse Screen  Abuse Screening Questionnaire 10/26/2022   Do you ever feel afraid of your partner? N   Are you in a relationship with someone who physically or mentally threatens you? N   Is it safe for you to go home?  Y         Patient Care Team   Patient Care Team:  Michael Mcdermott NP as PCP - General (Nurse Practitioner)  Artur Frank NP as PCP - INTERNAL MEDICINE (Nurse Practitioner)

## 2022-10-27 LAB
25(OH)D3 SERPL-MCNC: 36.4 NG/ML (ref 30–100)
ALBUMIN SERPL-MCNC: 3.9 G/DL (ref 3.5–5)
ALBUMIN/GLOB SERPL: 1.3 {RATIO} (ref 1.1–2.2)
ALP SERPL-CCNC: 87 U/L (ref 45–117)
ALT SERPL-CCNC: 21 U/L (ref 12–78)
ANION GAP SERPL CALC-SCNC: 5 MMOL/L (ref 5–15)
APPEARANCE UR: ABNORMAL
AST SERPL-CCNC: 21 U/L (ref 15–37)
BACTERIA URNS QL MICRO: ABNORMAL /HPF
BASOPHILS # BLD: 0.1 K/UL (ref 0–0.1)
BASOPHILS NFR BLD: 1 % (ref 0–1)
BILIRUB SERPL-MCNC: 0.5 MG/DL (ref 0.2–1)
BILIRUB UR QL: NEGATIVE
BUN SERPL-MCNC: 21 MG/DL (ref 6–20)
BUN/CREAT SERPL: 24 (ref 12–20)
CALCIUM SERPL-MCNC: 9.7 MG/DL (ref 8.5–10.1)
CHLORIDE SERPL-SCNC: 112 MMOL/L (ref 97–108)
CHOLEST SERPL-MCNC: 226 MG/DL
CK SERPL-CCNC: 72 U/L (ref 26–192)
CO2 SERPL-SCNC: 26 MMOL/L (ref 21–32)
COLOR UR: ABNORMAL
CREAT SERPL-MCNC: 0.86 MG/DL (ref 0.55–1.02)
DIFFERENTIAL METHOD BLD: ABNORMAL
EOSINOPHIL # BLD: 0.3 K/UL (ref 0–0.4)
EOSINOPHIL NFR BLD: 5 % (ref 0–7)
EPITH CASTS URNS QL MICRO: ABNORMAL /LPF
ERYTHROCYTE [DISTWIDTH] IN BLOOD BY AUTOMATED COUNT: 15.1 % (ref 11.5–14.5)
GLOBULIN SER CALC-MCNC: 2.9 G/DL (ref 2–4)
GLUCOSE SERPL-MCNC: 87 MG/DL (ref 65–100)
GLUCOSE UR STRIP.AUTO-MCNC: NEGATIVE MG/DL
HCT VFR BLD AUTO: 44 % (ref 35–47)
HDLC SERPL-MCNC: 82 MG/DL
HDLC SERPL: 2.8 {RATIO} (ref 0–5)
HGB BLD-MCNC: 14 G/DL (ref 11.5–16)
HGB UR QL STRIP: NEGATIVE
HYALINE CASTS URNS QL MICRO: ABNORMAL /LPF (ref 0–5)
IMM GRANULOCYTES # BLD AUTO: 0 K/UL (ref 0–0.04)
IMM GRANULOCYTES NFR BLD AUTO: 0 % (ref 0–0.5)
KETONES UR QL STRIP.AUTO: NEGATIVE MG/DL
LDLC SERPL CALC-MCNC: 113.2 MG/DL (ref 0–100)
LEUKOCYTE ESTERASE UR QL STRIP.AUTO: ABNORMAL
LYMPHOCYTES # BLD: 1.3 K/UL (ref 0.8–3.5)
LYMPHOCYTES NFR BLD: 26 % (ref 12–49)
MCH RBC QN AUTO: 31.3 PG (ref 26–34)
MCHC RBC AUTO-ENTMCNC: 31.8 G/DL (ref 30–36.5)
MCV RBC AUTO: 98.2 FL (ref 80–99)
MONOCYTES # BLD: 0.4 K/UL (ref 0–1)
MONOCYTES NFR BLD: 8 % (ref 5–13)
NEUTS SEG # BLD: 2.9 K/UL (ref 1.8–8)
NEUTS SEG NFR BLD: 60 % (ref 32–75)
NITRITE UR QL STRIP.AUTO: POSITIVE
NRBC # BLD: 0 K/UL (ref 0–0.01)
NRBC BLD-RTO: 0 PER 100 WBC
PH UR STRIP: 7.5 [PH] (ref 5–8)
PLATELET # BLD AUTO: 293 K/UL (ref 150–400)
PMV BLD AUTO: 9.7 FL (ref 8.9–12.9)
POTASSIUM SERPL-SCNC: 5.2 MMOL/L (ref 3.5–5.1)
PROT SERPL-MCNC: 6.8 G/DL (ref 6.4–8.2)
PROT UR STRIP-MCNC: NEGATIVE MG/DL
RBC # BLD AUTO: 4.48 M/UL (ref 3.8–5.2)
RBC #/AREA URNS HPF: ABNORMAL /HPF (ref 0–5)
SODIUM SERPL-SCNC: 143 MMOL/L (ref 136–145)
SP GR UR REFRACTOMETRY: 1.02 (ref 1–1.03)
T4 SERPL-MCNC: 13.7 UG/DL (ref 4.8–13.9)
TRIGL SERPL-MCNC: 154 MG/DL (ref ?–150)
TSH SERPL DL<=0.05 MIU/L-ACNC: 1 UIU/ML (ref 0.36–3.74)
UA: UC IF INDICATED,UAUC: ABNORMAL
UROBILINOGEN UR QL STRIP.AUTO: 0.2 EU/DL (ref 0.2–1)
VLDLC SERPL CALC-MCNC: 30.8 MG/DL
WBC # BLD AUTO: 4.9 K/UL (ref 3.6–11)
WBC URNS QL MICRO: ABNORMAL /HPF (ref 0–4)

## 2022-10-29 LAB
BACTERIA SPEC CULT: ABNORMAL
CC UR VC: ABNORMAL
SERVICE CMNT-IMP: ABNORMAL

## 2022-11-02 RX ORDER — CIPROFLOXACIN 250 MG/1
250 TABLET, FILM COATED ORAL 2 TIMES DAILY
Qty: 14 TABLET | Refills: 0 | Status: SHIPPED | OUTPATIENT
Start: 2022-11-02

## 2022-11-02 NOTE — TELEPHONE ENCOUNTER
PCP: Fredy Coello MD    Last appt: 10/26/2022  Future Appointments   Date Time Provider Neri Cole   4/26/2023  9:00 AM Fredy Coello MD [facility]       Last refilled:-    Requested Prescriptions     Pending Prescriptions Disp Refills    ciprofloxacin HCl (CIPRO) 250 mg tablet 14 Tablet 0     Sig: Take 1 Tablet by mouth two (2) times a day.

## 2022-11-02 NOTE — PROGRESS NOTES
Patient returned call  Two pt identifiers confirmed  Informed patient per Dr. Mathew Longoria that labs are ok except cholesterol and LDL are slightly elevated and to watch diet  Also informed patient per Dr. Mathew Longoria that she has uti and to take cipro 250 mg twice daily x 7 days  Rx was sent to [facility]  Patient will get follow up urinalysis 3-5 days after finishing antibiotic  Patient verbalized understanding of information discussed  with no further questions at this time.

## 2022-11-03 RX ORDER — PANTOPRAZOLE SODIUM 40 MG/1
TABLET, DELAYED RELEASE ORAL
Qty: 90 TABLET | Refills: 3 | Status: SHIPPED | OUTPATIENT
Start: 2022-11-03

## 2022-11-03 NOTE — TELEPHONE ENCOUNTER
PCP: Christos Zuniga MD    Last appt: 10/26/2022  Future Appointments   Date Time Provider Neri Cole   4/26/2023  9:00 AM Christos Zuniga MD [facility] AM[facility]       Last refilled:12/5/21    Requested Prescriptions     Pending Prescriptions Disp Refills    pantoprazole (PROTONIX) 40 mg tablet [Pharmacy Med Name: PANTOPRAZOLE 40MG TABLETS] 90 Tablet 3     Sig: TAKE 1 TABLET BY MOUTH DAILY

## 2022-11-14 ENCOUNTER — LAB ONLY (OUTPATIENT)
Dept: INTERNAL MEDICINE CLINIC | Age: 82
End: 2022-11-14

## 2022-11-14 DIAGNOSIS — N39.0 FREQUENT UTI: Primary | ICD-10-CM

## 2022-11-15 LAB
APPEARANCE UR: ABNORMAL
BACTERIA URNS QL MICRO: ABNORMAL /HPF
BILIRUB UR QL: NEGATIVE
COLOR UR: ABNORMAL
EPITH CASTS URNS QL MICRO: ABNORMAL /LPF
GLUCOSE UR STRIP.AUTO-MCNC: NEGATIVE MG/DL
HGB UR QL STRIP: NEGATIVE
KETONES UR QL STRIP.AUTO: NEGATIVE MG/DL
LEUKOCYTE ESTERASE UR QL STRIP.AUTO: ABNORMAL
NITRITE UR QL STRIP.AUTO: NEGATIVE
PH UR STRIP: 5 [PH] (ref 5–8)
PROT UR STRIP-MCNC: NEGATIVE MG/DL
RBC #/AREA URNS HPF: ABNORMAL /HPF (ref 0–5)
SP GR UR REFRACTOMETRY: 1.02 (ref 1–1.03)
UROBILINOGEN UR QL STRIP.AUTO: 0.2 EU/DL (ref 0.2–1)
WBC URNS QL MICRO: ABNORMAL /HPF (ref 0–4)

## 2022-11-16 LAB
BACTERIA SPEC CULT: NORMAL
SERVICE CMNT-IMP: NORMAL

## 2022-11-21 NOTE — PROGRESS NOTES
Called and spoke to patient  Two pt identifiers confirmed  Informed patient per Dr. Severa Novak that follow up urinalysis is negative and infection has cleared  Patient verbalized understanding of information discussed  with no further questions at this time.

## 2022-11-24 PROBLEM — Z00.00 MEDICARE ANNUAL WELLNESS VISIT, SUBSEQUENT: Status: RESOLVED | Noted: 2022-10-25 | Resolved: 2022-11-24

## 2022-12-14 DIAGNOSIS — R19.7 DIARRHEA, UNSPECIFIED TYPE: ICD-10-CM

## 2022-12-14 RX ORDER — DIPHENOXYLATE HYDROCHLORIDE AND ATROPINE SULFATE 2.5; .025 MG/1; MG/1
TABLET ORAL
Qty: 40 TABLET | Refills: 0 | Status: SHIPPED | OUTPATIENT
Start: 2022-12-14

## 2022-12-14 NOTE — TELEPHONE ENCOUNTER
RX refill request from the patient/pharmacy. Patient last seen 10- with labs, and next appt. scheduled for 04-  Requested Prescriptions     Pending Prescriptions Disp Refills    diphenoxylate-atropine (LOMOTIL) 2.5-0.025 mg per tablet [Pharmacy Med Name: DIPHENOXYLATE/ATROPINE 2.5MG TABS] 40 Tablet 0     Sig: TAKE 1 TABLET BY MOUTH FOUR TIMES DAILY FOR 10 DAYS AS NEEDED    .

## 2023-01-31 ENCOUNTER — OFFICE VISIT (OUTPATIENT)
Dept: INTERNAL MEDICINE CLINIC | Age: 83
End: 2023-01-31

## 2023-01-31 ENCOUNTER — HOSPITAL ENCOUNTER (INPATIENT)
Age: 83
LOS: 5 days | Discharge: HOME | DRG: 392 | End: 2023-02-05
Attending: EMERGENCY MEDICINE | Admitting: INTERNAL MEDICINE
Payer: MEDICARE

## 2023-01-31 ENCOUNTER — APPOINTMENT (OUTPATIENT)
Dept: CT IMAGING | Age: 83
DRG: 392 | End: 2023-01-31
Attending: EMERGENCY MEDICINE
Payer: MEDICARE

## 2023-01-31 VITALS
HEART RATE: 77 BPM | SYSTOLIC BLOOD PRESSURE: 126 MMHG | DIASTOLIC BLOOD PRESSURE: 68 MMHG | BODY MASS INDEX: 24.6 KG/M2 | WEIGHT: 122 LBS | TEMPERATURE: 98 F | HEIGHT: 59 IN | OXYGEN SATURATION: 97 %

## 2023-01-31 DIAGNOSIS — K52.9 ACUTE COLITIS: Primary | ICD-10-CM

## 2023-01-31 DIAGNOSIS — K21.9 GASTROESOPHAGEAL REFLUX DISEASE, UNSPECIFIED WHETHER ESOPHAGITIS PRESENT: ICD-10-CM

## 2023-01-31 DIAGNOSIS — E87.6 HYPOKALEMIA: ICD-10-CM

## 2023-01-31 LAB
ALBUMIN SERPL-MCNC: 2.8 G/DL (ref 3.5–5)
ALBUMIN/GLOB SERPL: 0.8 (ref 1.1–2.2)
ALP SERPL-CCNC: 148 U/L (ref 45–117)
ALT SERPL-CCNC: 24 U/L (ref 12–78)
ANION GAP SERPL CALC-SCNC: 6 MMOL/L (ref 5–15)
APPEARANCE UR: CLEAR
AST SERPL-CCNC: 29 U/L (ref 15–37)
BACTERIA URNS QL MICRO: NEGATIVE /HPF
BASOPHILS # BLD: 0.1 K/UL (ref 0–0.1)
BASOPHILS NFR BLD: 1 % (ref 0–1)
BILIRUB SERPL-MCNC: 0.4 MG/DL (ref 0.2–1)
BILIRUB UR QL CFM: NEGATIVE
BUN SERPL-MCNC: 7 MG/DL (ref 6–20)
BUN/CREAT SERPL: 7 (ref 12–20)
CALCIUM SERPL-MCNC: 9 MG/DL (ref 8.5–10.1)
CHLORIDE SERPL-SCNC: 110 MMOL/L (ref 97–108)
CO2 SERPL-SCNC: 25 MMOL/L (ref 21–32)
COLOR UR: ABNORMAL
CREAT SERPL-MCNC: 1.03 MG/DL (ref 0.55–1.02)
DIFFERENTIAL METHOD BLD: ABNORMAL
EOSINOPHIL # BLD: 0.3 K/UL (ref 0–0.4)
EOSINOPHIL NFR BLD: 4 % (ref 0–7)
EPITH CASTS URNS QL MICRO: ABNORMAL /LPF
ERYTHROCYTE [DISTWIDTH] IN BLOOD BY AUTOMATED COUNT: 15.1 % (ref 11.5–14.5)
GLOBULIN SER CALC-MCNC: 3.5 G/DL (ref 2–4)
GLUCOSE SERPL-MCNC: 98 MG/DL (ref 65–100)
GLUCOSE UR STRIP.AUTO-MCNC: NEGATIVE MG/DL
HCT VFR BLD AUTO: 37.9 % (ref 35–47)
HGB BLD-MCNC: 12.8 G/DL (ref 11.5–16)
HGB UR QL STRIP: NEGATIVE
IMM GRANULOCYTES # BLD AUTO: 0.2 K/UL (ref 0–0.04)
IMM GRANULOCYTES NFR BLD AUTO: 2 % (ref 0–0.5)
KETONES UR QL STRIP.AUTO: 15 MG/DL
LEUKOCYTE ESTERASE UR QL STRIP.AUTO: ABNORMAL
LIPASE SERPL-CCNC: 59 U/L (ref 73–393)
LYMPHOCYTES # BLD: 1.2 K/UL (ref 0.8–3.5)
LYMPHOCYTES NFR BLD: 15 % (ref 12–49)
MAGNESIUM SERPL-MCNC: 1.8 MG/DL (ref 1.6–2.4)
MCH RBC QN AUTO: 30.3 PG (ref 26–34)
MCHC RBC AUTO-ENTMCNC: 33.8 G/DL (ref 30–36.5)
MCV RBC AUTO: 89.8 FL (ref 80–99)
MONOCYTES # BLD: 0.5 K/UL (ref 0–1)
MONOCYTES NFR BLD: 6 % (ref 5–13)
NEUTS SEG # BLD: 5.8 K/UL (ref 1.8–8)
NEUTS SEG NFR BLD: 72 % (ref 32–75)
NITRITE UR QL STRIP.AUTO: NEGATIVE
NRBC # BLD: 0 K/UL (ref 0–0.01)
NRBC BLD-RTO: 0 PER 100 WBC
PH UR STRIP: 5.5 (ref 5–8)
PLATELET # BLD AUTO: 269 K/UL (ref 150–400)
PMV BLD AUTO: 9.7 FL (ref 8.9–12.9)
POTASSIUM SERPL-SCNC: 2.9 MMOL/L (ref 3.5–5.1)
PROT SERPL-MCNC: 6.3 G/DL (ref 6.4–8.2)
PROT UR STRIP-MCNC: 30 MG/DL
RBC # BLD AUTO: 4.22 M/UL (ref 3.8–5.2)
RBC #/AREA URNS HPF: ABNORMAL /HPF (ref 0–5)
SODIUM SERPL-SCNC: 141 MMOL/L (ref 136–145)
SP GR UR REFRACTOMETRY: 1.02
UA: UC IF INDICATED,UAUC: ABNORMAL
UROBILINOGEN UR QL STRIP.AUTO: 1 EU/DL (ref 0.2–1)
WBC # BLD AUTO: 8 K/UL (ref 3.6–11)
WBC URNS QL MICRO: ABNORMAL /HPF (ref 0–4)

## 2023-01-31 PROCEDURE — 74011250636 HC RX REV CODE- 250/636: Performed by: INTERNAL MEDICINE

## 2023-01-31 PROCEDURE — 74011000636 HC RX REV CODE- 636: Performed by: INTERNAL MEDICINE

## 2023-01-31 PROCEDURE — 74177 CT ABD & PELVIS W/CONTRAST: CPT

## 2023-01-31 PROCEDURE — 99223 1ST HOSP IP/OBS HIGH 75: CPT | Performed by: INTERNAL MEDICINE

## 2023-01-31 PROCEDURE — 80053 COMPREHEN METABOLIC PANEL: CPT

## 2023-01-31 PROCEDURE — 65270000029 HC RM PRIVATE

## 2023-01-31 PROCEDURE — 81001 URINALYSIS AUTO W/SCOPE: CPT

## 2023-01-31 PROCEDURE — 83690 ASSAY OF LIPASE: CPT

## 2023-01-31 PROCEDURE — 99285 EMERGENCY DEPT VISIT HI MDM: CPT

## 2023-01-31 PROCEDURE — 74011250636 HC RX REV CODE- 250/636: Performed by: EMERGENCY MEDICINE

## 2023-01-31 PROCEDURE — 83735 ASSAY OF MAGNESIUM: CPT

## 2023-01-31 PROCEDURE — 85025 COMPLETE CBC W/AUTO DIFF WBC: CPT

## 2023-01-31 PROCEDURE — 36415 COLL VENOUS BLD VENIPUNCTURE: CPT

## 2023-01-31 RX ORDER — METRONIDAZOLE 500 MG/100ML
500 INJECTION, SOLUTION INTRAVENOUS EVERY 8 HOURS
Status: DISCONTINUED | OUTPATIENT
Start: 2023-01-31 | End: 2023-02-04

## 2023-01-31 RX ORDER — LORAZEPAM 0.5 MG/1
0.5 TABLET ORAL
Status: DISCONTINUED | OUTPATIENT
Start: 2023-01-31 | End: 2023-02-05 | Stop reason: HOSPADM

## 2023-01-31 RX ORDER — LEVOFLOXACIN 5 MG/ML
750 INJECTION, SOLUTION INTRAVENOUS
Status: DISCONTINUED | OUTPATIENT
Start: 2023-01-31 | End: 2023-02-01

## 2023-01-31 RX ORDER — LEVOTHYROXINE SODIUM 88 UG/1
88 TABLET ORAL
Status: DISCONTINUED | OUTPATIENT
Start: 2023-02-01 | End: 2023-02-05 | Stop reason: HOSPADM

## 2023-01-31 RX ORDER — DEXTROSE, SODIUM CHLORIDE, AND POTASSIUM CHLORIDE 5; .45; .15 G/100ML; G/100ML; G/100ML
75 INJECTION INTRAVENOUS CONTINUOUS
Status: DISCONTINUED | OUTPATIENT
Start: 2023-01-31 | End: 2023-02-04

## 2023-01-31 RX ORDER — LOPERAMIDE HYDROCHLORIDE 2 MG/1
2 CAPSULE ORAL
Status: DISCONTINUED | OUTPATIENT
Start: 2023-01-31 | End: 2023-02-05 | Stop reason: HOSPADM

## 2023-01-31 RX ORDER — FOLIC ACID 1 MG/1
2 TABLET ORAL DAILY
Status: DISCONTINUED | OUTPATIENT
Start: 2023-02-01 | End: 2023-02-05 | Stop reason: HOSPADM

## 2023-01-31 RX ORDER — CETIRIZINE HYDROCHLORIDE 10 MG/1
10 TABLET ORAL DAILY
Status: DISCONTINUED | OUTPATIENT
Start: 2023-02-01 | End: 2023-02-05 | Stop reason: HOSPADM

## 2023-01-31 RX ORDER — CETIRIZINE HYDROCHLORIDE 10 MG/1
CAPSULE, LIQUID FILLED ORAL
COMMUNITY

## 2023-01-31 RX ORDER — LEVOFLOXACIN 5 MG/ML
750 INJECTION, SOLUTION INTRAVENOUS EVERY 24 HOURS
Status: DISCONTINUED | OUTPATIENT
Start: 2023-01-31 | End: 2023-01-31

## 2023-01-31 RX ORDER — ONDANSETRON 4 MG/1
4 TABLET, ORALLY DISINTEGRATING ORAL
Status: DISCONTINUED | OUTPATIENT
Start: 2023-01-31 | End: 2023-02-05 | Stop reason: HOSPADM

## 2023-01-31 RX ORDER — POTASSIUM CHLORIDE 7.45 MG/ML
10 INJECTION INTRAVENOUS
Status: COMPLETED | OUTPATIENT
Start: 2023-01-31 | End: 2023-01-31

## 2023-01-31 RX ADMIN — LEVOFLOXACIN 750 MG: 5 INJECTION, SOLUTION INTRAVENOUS at 14:02

## 2023-01-31 RX ADMIN — POTASSIUM CHLORIDE, DEXTROSE MONOHYDRATE AND SODIUM CHLORIDE 100 ML/HR: 150; 5; 450 INJECTION, SOLUTION INTRAVENOUS at 14:35

## 2023-01-31 RX ADMIN — IOPAMIDOL 100 ML: 755 INJECTION, SOLUTION INTRAVENOUS at 13:43

## 2023-01-31 RX ADMIN — METRONIDAZOLE 500 MG: 500 INJECTION, SOLUTION INTRAVENOUS at 20:30

## 2023-01-31 RX ADMIN — POTASSIUM CHLORIDE 10 MEQ: 7.46 INJECTION, SOLUTION INTRAVENOUS at 12:53

## 2023-01-31 RX ADMIN — METRONIDAZOLE 500 MG: 500 INJECTION, SOLUTION INTRAVENOUS at 14:03

## 2023-01-31 NOTE — ED NOTES
Pt arrives ambulatory to triage, reports she was sent to ED by PCP. Reports she has been experiencing N/V/D since Friday, reports now any PO intake results in diarrhea. Having lower abd pain.

## 2023-01-31 NOTE — PROGRESS NOTES
Deyvi Sheehan is a 78 y.o. female     Chief Complaint   Patient presents with    ED Follow-up     Urgent care follow up       Visit Vitals  /68 (BP 1 Location: Right upper arm, BP Patient Position: Sitting, BP Cuff Size: Adult)   Pulse 77   Temp 98 °F (36.7 °C) (Oral)   Ht 4' 11\" (1.499 m)   Wt 122 lb (55.3 kg)   SpO2 97%   BMI 24.64 kg/m²       Health Maintenance Due   Topic Date Due    Shingles Vaccine (2 of 2) 07/11/2019         1. \"Have you been to the ER, urgent care clinic since your last visit? Hospitalized since your last visit? \"  [facility] 1/29    2. \"Have you seen or consulted any other health care providers outside of the [address] [place] [address] since your last visit? \"  [facility]      3. For patients aged 39-70: Has the patient had a colonoscopy / FIT/ Cologuard? NA - based on age      If the patient is female:    4. For patients aged 41-77: Has the patient had a mammogram within the past 2 years? NA - based on age or sex      11. For patients aged 21-65: Has the patient had a pap smear?  NA - based on age or sex

## 2023-01-31 NOTE — ED PROVIDER NOTES
[facility] EMERGENCY DEPT  EMERGENCY DEPARTMENT ENCOUNTER       Pt Name: Annika Arora  MRN: [id number]  Quangfjusten 1940  Date of evaluation: 1/31/2023  Provider: Debra Logan MD   PCP: Ann Galdamez MD  Note Started: 12:53 PM 1/31/23     CHIEF COMPLAINT       Chief Complaint   Patient presents with    Referral / Consult     Pt arrives ambulatory to triage, reports she was sent to ED by PCP. Reports she has been experiencing N/V/D since Friday, reports now any PO intake results in diarrhea. Having lower abd pain. Abdominal Pain        HISTORY OF PRESENT ILLNESS: 1 or more elements      History From: Patient and primary care physician, History limited by: none     June B Elisabeth Torres is a 78 y.o. female presenting with abdominal pain and diarrhea. Patient's symptoms started 8 days ago. She initially had nausea and vomiting which has resolved. She never had a fever. She was seen at an [facility] 2 days ago, diagnosed with colitis, which was shown on the CT. She was placed on Cipro and Flagyl, but has had no improvement of symptoms since then. She was seen by her PCP today, and sent in for admission. She denies any abdominal pain currently, but does have tenderness involving her lower abdomen. She has diarrhea anytime she drinks or eats something. She feels slightly lightheaded. She denies dysuria. Nursing Notes were all reviewed and agreed with or any disagreements were addressed in the HPI. REVIEW OF SYSTEMS        Positives and Pertinent negatives as per HPI.     PAST HISTORY     Past Medical History:  Past Medical History:   Diagnosis Date    Acute recurrent pansinusitis 2/23/2018    Annual physical exam 8/3/2017    Anxiety 8/3/2017    Arthralgia 8/3/2017    Arthritis     Arthritis of right hip 8/3/2017    Arthritis, hip 8/3/2017    Autoimmune disease ([address]     psoriasis    Avascular necrosis of femoral head ([address] left 8/3/2017    Bone loss 8/3/2017    Cancer ([address]     endometrial Chronic eczematous otitis externa of both ears 8/24/2017    Contusion of left knee, initial encounter 8/3/2017    Degenerative arthritis of lumbar spine 8/3/2017    Depression     Elevated liver function tests 8/3/2017    MIAN (generalized anxiety disorder) 8/3/2017    GERD (gastroesophageal reflux disease)     Glaucoma     Glaucoma 8/3/2017    Headache, acute 8/3/2017    Herpes zoster without complication 29/61/9690    History of colonoscopy with polypectomy 8/3/2017    History of endometrial cancer 8/3/2017    Hyperlipidemia 8/3/2017    Hypothyroid 8/3/2017    IBS (irritable bowel syndrome) 8/3/2017    AMPARO (iron deficiency anemia) 8/3/2017    Insomnia 8/3/2017    Labral tear of hip, degenerative 10/24/2017    Long-term use of immunosuppressant medication 8/3/2017    Medication side effect 8/3/2017    Melena 8/3/2017    Monilial vaginitis 8/24/2017    Muscle spasm 8/3/2017    Osteoporosis 8/3/2017    Other ill-defined conditions(799.89)     high cholesterol    Other ill-defined conditions(799.89)     psoriasis    Post-menopausal 8/3/2017    Psoriasis 8/3/2017    Pyuria, sterile 8/3/2017    Rash 8/3/2017    Rosacea blepharoconjunctivitis 8/3/2017    Sciatica 8/3/2017    Somatic dysfunction of cervical region 8/3/2017    Spondylosis of cervical region without myelopathy or radiculopathy 8/3/2017    Thrush 8/3/2017    Thyroid disease     Urticaria 8/3/2017       Past Surgical History:  Past Surgical History:   Procedure Laterality Date    COLONOSCOPY N/A 6/12/2019    COLONOSCOPY performed by Rober Pelletier MD at [facility] ENDOSCOPY    COLONOSCOPY,DIAGNOSTIC  6/12/2019         COLORECTAL SCRN; HI RISK IND  4/2/2014         HX APPENDECTOMY      HX GYN      hysterectomy    HX HEENT      bilateral ear    HX HEENT      LASIK    HX ORTHOPAEDIC  1-2011    lumbar laminectomy    HX OTHER SURGICAL Bilateral     cataract extraction with lens implant    HX TONSILLECTOMY      [facility] UNLISTED NEUROLOGICAL/NEUROMUSCULAR DX PX 2011    spinal fusion       Family History:  Family History   Problem Relation Age of Onset    Heart Disease Mother     Other Mother         kidney stones    Diabetes Father     Cancer Sister         colon cancer    Heart Disease Brother     Diabetes Brother     Other Brother         kidney stones    Colon Cancer Brother     Heart Disease Brother     Other Brother         kidney stones    Heart Disease Brother     Heart Disease Brother     Cancer Brother         melanoma       Social History:  Social History     Tobacco Use    Smoking status: Former     Types: Cigarettes     Quit date: 1979     Years since quittin.0    Smokeless tobacco: Never   Vaping Use    Vaping Use: Never used   Substance Use Topics    Alcohol use: Yes     Alcohol/week: 7.0 standard drinks     Types: 7 Glasses of wine per week    Drug use: No       Allergies: Allergies   Allergen Reactions    Cephalosporins Shortness of Breath    Codeine Nausea and Vomiting    Pcn [Penicillins] Rash       CURRENT MEDICATIONS      Previous Medications    ACETAMINOPHEN (TYLENOL) 500 MG TABLET    Take 500 mg by mouth every six (6) hours as needed for Pain. CETIRIZINE (ZYRTEC) 10 MG CAP    Take  by mouth. CIPROFLOXACIN HCL (CIPRO) 250 MG TABLET    Take 1 Tablet by mouth two (2) times a day. CLOBETASOL (TEMOVATE) 0.05 % OINTMENT    two (2) times daily as needed. DORZOLAMIDE-TIMOLOL (COSOPT) 22.3-6.8 MG/ML OPHTHALMIC SOLUTION    Cosopt 22.3 mg-6.8 mg/mL eye drops   Prescribed by non [facility] MD    FOLIC ACID (FOLVITE) 1 MG TABLET    TAKE 2 TABLETS BY MOUTH DAILY    LEVOTHYROXINE (SYNTHROID) 88 MCG TABLET    TAKE 1 TABLET BY MOUTH DAILY    LORAZEPAM (ATIVAN) 0.5 MG TABLET    Take 1 Tablet by mouth nightly as needed (sleep). Max Daily Amount: 0.5 mg. PANTOPRAZOLE (PROTONIX) 40 MG TABLET    TAKE 1 TABLET BY MOUTH DAILY    TRAVOPROST (TRAVATAN Z) 0.004 % OPHTHALMIC SOLUTION    Administer 1 Drop to both eyes nightly.        SCREENINGS               No data recorded         PHYSICAL EXAM      ED Triage Vitals [01/31/23 1141]   ED Encounter Vitals Group      BP (!) 126/96      Pulse (Heart Rate) 74      Resp Rate 16      Temp 98.2 °F (36.8 °C)      Temp src       O2 Sat (%) 100 %      Weight 121 lb 14.6 oz      Height 4' 11\"        Physical Exam  Vitals and nursing note reviewed. Constitutional:       General: She is not in acute distress. Appearance: She is well-developed. HENT:      Head: Normocephalic. Cardiovascular:      Rate and Rhythm: Normal rate and regular rhythm. Heart sounds: Normal heart sounds. Pulmonary:      Effort: Pulmonary effort is normal.      Breath sounds: Normal breath sounds. Abdominal:      General: Bowel sounds are normal.      Palpations: Abdomen is soft. Tenderness: There is abdominal tenderness in the right lower quadrant, suprapubic area and left lower quadrant. Musculoskeletal:         General: Normal range of motion. Cervical back: Normal range of motion and neck supple. Skin:     General: Skin is warm and dry. Neurological:      General: No focal deficit present. Mental Status: She is alert and oriented to person, place, and time. Psychiatric:         Mood and Affect: Mood normal.         Behavior: Behavior normal.        DIAGNOSTIC RESULTS   LABS:     Recent Results (from the past 12 hour(s))   METABOLIC PANEL, COMPREHENSIVE    Collection Time: 01/31/23 11:51 AM   Result Value Ref Range    Sodium 141 136 - 145 mmol/L    Potassium 2.9 (L) 3.5 - 5.1 mmol/L    Chloride 110 (H) 97 - 108 mmol/L    CO2 25 21 - 32 mmol/L    Anion gap 6 5 - 15 mmol/L    Glucose 98 65 - 100 mg/dL    BUN 7 6 - 20 MG/DL    Creatinine 1.03 (H) 0.55 - 1.02 MG/DL    BUN/Creatinine ratio 7 (L) 12 - 20      eGFR 54 (L) >60 ml/min/1.73m2    Calcium 9.0 8.5 - 10.1 MG/DL    Bilirubin, total 0.4 0.2 - 1.0 MG/DL    ALT (SGPT) 24 12 - 78 U/L    AST (SGOT) 29 15 - 37 U/L    Alk.  phosphatase 148 (H) 45 - 117 U/L    Protein, total 6.3 (L) 6.4 - 8.2 g/dL    Albumin 2.8 (L) 3.5 - 5.0 g/dL    Globulin 3.5 2.0 - 4.0 g/dL    A-G Ratio 0.8 (L) 1.1 - 2.2     LIPASE    Collection Time: 01/31/23 11:51 AM   Result Value Ref Range    Lipase 59 (L) 73 - 393 U/L   URINALYSIS W/ REFLEX CULTURE    Collection Time: 01/31/23 11:51 AM    Specimen: Miscellaneous sample; Urine    Urine specimen   Result Value Ref Range    Color DARK YELLOW      Appearance CLEAR CLEAR      Specific gravity 1.022      pH (UA) 5.5 5.0 - 8.0      Protein 30 (A) NEG mg/dL    Glucose Negative NEG mg/dL    Ketone 15 (A) NEG mg/dL    Blood Negative NEG      Urobilinogen 1.0 0.2 - 1.0 EU/dL    Nitrites Negative NEG      Leukocyte Esterase SMALL (A) NEG      WBC 5-10 0 - 4 /hpf    RBC 0-5 0 - 5 /hpf    Epithelial cells MODERATE (A) FEW /lpf    Bacteria Negative NEG /hpf    UA:UC IF INDICATED CULTURE NOT INDICATED BY UA RESULT CNI     CBC WITH AUTOMATED DIFF    Collection Time: 01/31/23 11:51 AM   Result Value Ref Range    WBC 8.0 3.6 - 11.0 K/uL    RBC 4.22 3.80 - 5.20 M/uL    HGB 12.8 11.5 - 16.0 g/dL    HCT 37.9 35.0 - 47.0 %    MCV 89.8 80.0 - 99.0 FL    MCH 30.3 26.0 - 34.0 PG    MCHC 33.8 30.0 - 36.5 g/dL    RDW 15.1 (H) 11.5 - 14.5 %    PLATELET 131 870 - 803 K/uL    MPV 9.7 8.9 - 12.9 FL    NRBC 0.0 0  WBC    ABSOLUTE NRBC 0.00 0.00 - 0.01 K/uL    NEUTROPHILS 72 32 - 75 %    LYMPHOCYTES 15 12 - 49 %    MONOCYTES 6 5 - 13 %    EOSINOPHILS 4 0 - 7 %    BASOPHILS 1 0 - 1 %    IMMATURE GRANULOCYTES 2 (H) 0.0 - 0.5 %    ABS. NEUTROPHILS 5.8 1.8 - 8.0 K/UL    ABS. LYMPHOCYTES 1.2 0.8 - 3.5 K/UL    ABS. MONOCYTES 0.5 0.0 - 1.0 K/UL    ABS. EOSINOPHILS 0.3 0.0 - 0.4 K/UL    ABS. BASOPHILS 0.1 0.0 - 0.1 K/UL    ABS. IMM.  GRANS. 0.2 (H) 0.00 - 0.04 K/UL    DF AUTOMATED     BILIRUBIN, CONFIRM    Collection Time: 01/31/23 11:51 AM   Result Value Ref Range    Bilirubin UA, confirm Negative NEG     MAGNESIUM    Collection Time: 01/31/23 11:51 AM   Result Value Ref Range    Magnesium 1.8 1.6 - 2.4 mg/dL        EKG: If performed, independent interpretation documented below in the MDM section     RADIOLOGY:  Non-plain film images such as CT, Ultrasound and MRI are read by the radiologist. Plain radiographic images are visualized and preliminarily interpreted by the ED Provider with the findings documented in the MDM section. Interpretation per the Radiologist below, if available at the time of this note:     CT ABD PELV W CONT    Result Date: 1/31/2023  EXAM: CT ABD PELV W CONT INDICATION: Colitis COMPARISON: November 2017 CONTRAST: 100 mL of Isovue-370. ORAL CONTRAST: None TECHNIQUE: Following the uneventful intravenous administration of contrast, thin axial images were obtained through the abdomen and pelvis. Coronal and sagittal reconstructions were generated. CT dose reduction was achieved through use of a standardized protocol tailored for this examination and automatic exposure control for dose modulation. FINDINGS: LOWER THORAX: No significant abnormality in the incidentally imaged lower chest. LIVER: Enhancement in the superior aspect of the right lobe of the liver once again demonstrated slightly enlarged. BILIARY TREE: Gallbladder is within normal limits. CBD is not dilated. SPLEEN: within normal limits. PANCREAS: No mass or ductal dilatation. ADRENALS: Unremarkable. KIDNEYS: No mass, calculus, or hydronephrosis. STOMACH: Unremarkable. SMALL BOWEL: No dilatation or wall thickening. COLON: Diffuse colonic wall thickening suggest strongly a nonspecific colitis. There is some mild pericolonic fat stranding, there is no free air or free fluid obstruction APPENDIX: Appendectomy PERITONEUM: No ascites or pneumoperitoneum. RETROPERITONEUM: No lymphadenopathy or aortic aneurysm. REPRODUCTIVE ORGANS: Hysterectomy laminectomy URINARY BLADDER: No mass or calculus. BONES: No destructive bone lesion. ABDOMINAL WALL: No mass or hernia. ADDITIONAL COMMENTS: N/A     Diffuse colitis.        PROCEDURES Unless otherwise noted below, none  Procedures     CRITICAL CARE TIME   0    EMERGENCY DEPARTMENT COURSE and DIFFERENTIAL DIAGNOSIS/MDM   Vitals:    Vitals:    01/31/23 1141   BP: (!) 126/96   Pulse: 74   Resp: 16   Temp: 98.2 °F (36.8 °C)   SpO2: 100%   Weight: 55.3 kg (121 lb 14.6 oz)   Height: 4' 11\" (1.499 m)        Patient was given the following medications:  Medications   potassium chloride 10 mEq in 100 ml IVPB (has no administration in time range)       Medical Decision Making  The patient is being admitted by her primary care physician, Dr. Joaquin Johnson. Amount and/or Complexity of Data Reviewed  Labs: ordered. Decision-making details documented in ED Course. Radiology: ordered. Discussion of management or test interpretation with external provider(s): Spoke to Dr. Walter Aguilar, primary care physician. He says I can order CT for any worsening of the patient's colitis    Risk  Prescription drug management. Decision regarding hospitalization. FINAL IMPRESSION     1. Acute colitis    2. Hypokalemia          DISPOSITION/PLAN   Nadine B Edythe Holiday  results have been reviewed with her. She has been counseled regarding her diagnosis, treatment, and plan. She verbally conveys understanding and agreement of the signs, symptoms, diagnosis, treatment and prognosis and additionally agrees to follow up as discussed. She also agrees with the care-plan and conveys that all of her questions have been answered. I have also provided discharge instructions for her that include: educational information regarding their diagnosis and treatment, and list of reasons why they would want to return to the ED prior to their follow-up appointment, should her condition change. CLINICAL IMPRESSION    Admit Note: Pt is being admitted by Dr. Walter Aguilar. The results of their tests and reason(s) for their admission have been discussed with pt and/or available family.  They convey agreement and understanding for the need to be admitted and for the admission diagnosis. PATIENT REFERRED TO:  Follow-up Information    None           DISCHARGE MEDICATIONS:  Current Discharge Medication List            DISCONTINUED MEDICATIONS:  Current Discharge Medication List        STOP taking these medications       diphenoxylate-atropine (LOMOTIL) 2.5-0.025 mg per tablet Comments:   Reason for Stopping:         estradioL (ESTRACE) 0.5 mg tablet Comments:   Reason for Stopping:         meloxicam (MOBIC) 15 mg tablet Comments:   Reason for Stopping:         pregabalin (LYRICA) 100 mg capsule Comments:   Reason for Stopping:         estrogens, conjugated, (PREMARIN) 0.45 mg tablet Comments:   Reason for Stopping:         escitalopram oxalate (LEXAPRO) 20 mg tablet Comments:   Reason for Stopping:         dicyclomine (BENTYL) 10 mg capsule Comments:   Reason for Stopping:         loratadine (CLARITIN PO) Comments:   Reason for Stopping:         cyclobenzaprine (FLEXERIL) 10 mg tablet Comments:   Reason for Stopping:         MAGNESIUM PO Comments:   Reason for Stopping:               I am the Primary Clinician of Record. Peggy Rodriguez MD (electronically signed)    (Please note that parts of this dictation were completed with voice recognition software. Quite often unanticipated grammatical, syntax, homophones, and other interpretive errors are inadvertently transcribed by the computer software. Please disregards these errors.  Please excuse any errors that have escaped final proofreading.)

## 2023-01-31 NOTE — ED NOTES
Pt presents to Ed via triage. Pt states she has had diarrhea, nausea, vomiting for a few days and has only had clear liquids. Pt went to the doctor and was placed on flagyl. Pt states she has not had any relief.

## 2023-01-31 NOTE — H&P
INTERNAL MEDICINE ADMISSION NOTE    NAME:  Nadine Humphrey Read   :   1940   MRN:   [id number]     Date/Time:  2023 12:47 PM  Subjective:   CHIEF COMPLAINT: Abdominal pain    HISTORY OF PRESENT ILLNESS:     Nadine is a 78 y.o.  female who presents with abdominal pain as noted above. She has recently started seeing me as a physician and is followed for GERD, IBS, DJD, anxiety with depression, hypothyroidism and other medical problems. She presents today complaining of severe abdominal pain and diarrhea multiple times per day that has been present now for about 8 days. She initially had nausea vomiting but that has resolved. She notes no fevers or chills. [facility] at which time she had a CBC obtained revealing a white count of 13,100 and also had a CT of her abdomen consistent with an acute colitis. She was empirically placed on Cipro and Flagyl which she has been taking religiously. She also had 2 L of IV fluid while she was in the emergency room secondary to dehydration. Since she was released from there 2 days ago she has been taking very little orally and persist with frequent watery diarrhea. She does not have any nausea but just does not have an appetite. She has noted no recent fevers or chills. She is found in the office to have marked tenderness in the lower abdomen increased in the left lower quadrant and felt prudent to admit her to the hospital for more aggressive treatment of her colitis with IV therapy. Certainly ischemic colitis is a consideration.       Past Medical History:   Diagnosis Date    Acute recurrent pansinusitis 2018    Annual physical exam 8/3/2017    Anxiety 8/3/2017    Arthralgia 8/3/2017    Arthritis     Arthritis of right hip 8/3/2017    Arthritis, hip 8/3/2017    Autoimmune disease ([address]     psoriasis    Avascular necrosis of femoral head ([address] left 8/3/2017    Bone loss 8/3/2017    Cancer ([address]     endometrial Chronic eczematous otitis externa of both ears 8/24/2017    Contusion of left knee, initial encounter 8/3/2017    Degenerative arthritis of lumbar spine 8/3/2017    Depression     Elevated liver function tests 8/3/2017    MIAN (generalized anxiety disorder) 8/3/2017    GERD (gastroesophageal reflux disease)     Glaucoma     Glaucoma 8/3/2017    Headache, acute 8/3/2017    Herpes zoster without complication 78/23/5100    History of colonoscopy with polypectomy 8/3/2017    History of endometrial cancer 8/3/2017    Hyperlipidemia 8/3/2017    Hypothyroid 8/3/2017    IBS (irritable bowel syndrome) 8/3/2017    AMPARO (iron deficiency anemia) 8/3/2017    Insomnia 8/3/2017    Labral tear of hip, degenerative 10/24/2017    Long-term use of immunosuppressant medication 8/3/2017    Medication side effect 8/3/2017    Melena 8/3/2017    Monilial vaginitis 8/24/2017    Muscle spasm 8/3/2017    Osteoporosis 8/3/2017    Other ill-defined conditions(799.89)     high cholesterol    Other ill-defined conditions(799.89)     psoriasis    Post-menopausal 8/3/2017    Psoriasis 8/3/2017    Pyuria, sterile 8/3/2017    Rash 8/3/2017    Rosacea blepharoconjunctivitis 8/3/2017    Sciatica 8/3/2017    Somatic dysfunction of cervical region 8/3/2017    Spondylosis of cervical region without myelopathy or radiculopathy 8/3/2017    Thrush 8/3/2017    Thyroid disease     Urticaria 8/3/2017        Past Surgical History:   Procedure Laterality Date    COLONOSCOPY N/A 6/12/2019    COLONOSCOPY performed by Ponce Agustin MD at [facility]  6/12/2019         COLORECTAL SCRN; HI RISK IND  4/2/2014         HX APPENDECTOMY      HX GYN      hysterectomy    HX HEENT      bilateral ear    HX HEENT      LASIK    HX ORTHOPAEDIC  1-2011    lumbar laminectomy    HX OTHER SURGICAL Bilateral     cataract extraction with lens implant    HX TONSILLECTOMY      [place] UNLISTED NEUROLOGICAL/NEUROMUSCULAR DX PX  2011    spinal fusion       Social History     Tobacco Use    Smoking status: Former     Types: Cigarettes     Quit date: 1979     Years since quittin.0    Smokeless tobacco: Never   Substance Use Topics    Alcohol use: Yes     Alcohol/week: 7.0 standard drinks     Types: 7 Glasses of wine per week        Family History   Problem Relation Age of Onset    Heart Disease Mother     Other Mother         kidney stones    Diabetes Father     Cancer Sister         colon cancer    Heart Disease Brother     Diabetes Brother     Other Brother         kidney stones    Colon Cancer Brother     Heart Disease Brother     Other Brother         kidney stones    Heart Disease Brother     Heart Disease Brother     Cancer Brother         melanoma        Allergies   Allergen Reactions    Cephalosporins Shortness of Breath    Codeine Nausea and Vomiting    Pcn [Penicillins] Rash        Prior to Admission medications    Medication Sig Start Date End Date Taking? Authorizing Provider   Cetirizine (ZyrTEC) 10 mg cap Take  by mouth. Provider, Historical   pantoprazole (PROTONIX) 40 mg tablet TAKE 1 TABLET BY MOUTH DAILY 11/3/22   Tiffany Guzman MD   ciprofloxacin HCl (CIPRO) 250 mg tablet Take 1 Tablet by mouth two (2) times a day. 22   Tiffany Guzman MD   acetaminophen (TYLENOL) 500 mg tablet Take 500 mg by mouth every six (6) hours as needed for Pain. Provider, Historical   folic acid (FOLVITE) 1 mg tablet TAKE 2 TABLETS BY MOUTH DAILY 10/18/22   Tiffany Guzman MD   levothyroxine (SYNTHROID) 88 mcg tablet TAKE 1 TABLET BY MOUTH DAILY 21   Watson Aguilera NP   LORazepam (Ativan) 0.5 mg tablet Take 1 Tablet by mouth nightly as needed (sleep).  Max Daily Amount: 0.5 mg. 21   Watson Aguilera NP   dorzolamide-timoloL (COSOPT) 22.3-6.8 mg/mL ophthalmic solution Cosopt 22.3 mg-6.8 mg/mL eye drops   Prescribed by non 606/706 Marion Del Rosario MD 17   Provider, Historical   clobetasol (TEMOVATE) 0.05 % ointment two (2) times daily as needed. 10/23/18   Provider, Historical   travoprost (TRAVATAN Z) 0.004 % ophthalmic solution Administer 1 Drop to both eyes nightly. Provider, Historical       REVIEW OF SYSTEMS:    Constitutional:  negative for fevers, chills and weight loss but positive for weakness and anorexia  Eyes:   negative for visual disturbance and irritation  ENT:   negative for hearing loss, tinnitus, nasal congestion, epistaxis, sore throat  Neck:              negative for stiffness or swollen glands  Respiratory:  negative for cough, hemoptysis, pleurisy/chest pain, wheezing or dyspnea on exertion  Cards:   negative for chest pain, palpitations, orthopnea, PND, lower extremity edema  GI:   Positive abdominal pain with diarrhea as noted over the 8 days PTA with initial some nausea vomiting for the first couple days. Little to no appetite and very little oral intake over the last few days  Genitourinary: negative for frequency, dysuria and hematuria  Integument:  negative for rash and pruritus  Hematologic:  negative for easy bruising and bleeding  Lymphatic:      negative for swollen lymph nodes or night sweats  Musculoskel: negative for myalgias, arthralgias, back pain and muscle weakness  Neurological:  negative for headaches, dizziness, vertigo, memory problems and gait problems  Behavl/Psych:  negative for anxiety, depression and illegal drug usage      Objective:   VITALS:    Visit Vitals  BP (!) 126/96   Pulse 74   Temp 98.2 °F (36.8 °C)   Resp 16   Ht 4' 11\" (1.499 m)   Wt 121 lb 14.6 oz (55.3 kg)   SpO2 100%   BMI 24.62 kg/m²       PHYSICAL EXAM:   General:    Alert, cooperative, no distress, appears stated age. Head:   Normocephalic, without obvious abnormality, atraumatic. Eyes:   Conjunctivae/corneas clear. PERRL  Nose:  Nares normal. No drainage or sinus tenderness.   Throat:    Lips, mucosa, and tongue normal.  No Thrush  Neck:  Supple, symmetrical,  no adenopathy, thyroid: non tender    no carotid bruit and no JVD. Back:    Symmetric,  No CVA tenderness. Lungs:   Clear to auscultation bilaterally. No Wheezing or Rhonchi. No rales. Chest wall:  No tenderness or deformity. No Accessory muscle use. Heart:   Regular rate and rhythm,  no murmur, rub or gallop. Abdomen:   Soft, marked diffuse tenderness increased in the lower abdomen particularly left lower quadrant with some rebound in the left lower quadrant. Not distended. Bowel sounds hyperactive. No masses  Extremities: Extremities normal, atraumatic, No cyanosis. No edema. No clubbing  Skin:     Texture, turgor normal. No rashes or lesions. Not Jaundiced  Lymph nodes: Cervical, supraclavicular normal.  Psych:  Good insight. Not depressed. Not anxious or agitated. Neurologic: EOMs intact. No facial asymmetry. No aphasia or slurred speech. Normal   strength, Alert and oriented X 3. LAB DATA REVIEWED:    Recent Results (from the past 24 hour(s))   METABOLIC PANEL, COMPREHENSIVE    Collection Time: 01/31/23 11:51 AM   Result Value Ref Range    Sodium 141 136 - 145 mmol/L    Potassium 2.9 (L) 3.5 - 5.1 mmol/L    Chloride 110 (H) 97 - 108 mmol/L    CO2 25 21 - 32 mmol/L    Anion gap 6 5 - 15 mmol/L    Glucose 98 65 - 100 mg/dL    BUN 7 6 - 20 MG/DL    Creatinine 1.03 (H) 0.55 - 1.02 MG/DL    BUN/Creatinine ratio 7 (L) 12 - 20      eGFR 54 (L) >60 ml/min/1.73m2    Calcium 9.0 8.5 - 10.1 MG/DL    Bilirubin, total 0.4 0.2 - 1.0 MG/DL    ALT (SGPT) 24 12 - 78 U/L    AST (SGOT) 29 15 - 37 U/L    Alk.  phosphatase 148 (H) 45 - 117 U/L    Protein, total 6.3 (L) 6.4 - 8.2 g/dL    Albumin 2.8 (L) 3.5 - 5.0 g/dL    Globulin 3.5 2.0 - 4.0 g/dL    A-G Ratio 0.8 (L) 1.1 - 2.2     LIPASE    Collection Time: 01/31/23 11:51 AM   Result Value Ref Range    Lipase 59 (L) 73 - 393 U/L   URINALYSIS W/ REFLEX CULTURE    Collection Time: 01/31/23 11:51 AM    Specimen: Urine   Result Value Ref Range    Color DARK YELLOW      Appearance CLEAR CLEAR      Specific gravity 1.022      pH (UA) 5.5 5.0 - 8.0      Protein 30 (A) NEG mg/dL    Glucose Negative NEG mg/dL    Ketone 15 (A) NEG mg/dL    Blood Negative NEG      Urobilinogen 1.0 0.2 - 1.0 EU/dL    Nitrites Negative NEG      Leukocyte Esterase SMALL (A) NEG      WBC PENDING /hpf    RBC PENDING /hpf    Epithelial cells PENDING /lpf    Bacteria PENDING /hpf    UA:UC IF INDICATED PENDING    CBC WITH AUTOMATED DIFF    Collection Time: 01/31/23 11:51 AM   Result Value Ref Range    WBC 8.0 3.6 - 11.0 K/uL    RBC 4.22 3.80 - 5.20 M/uL    HGB 12.8 11.5 - 16.0 g/dL    HCT 37.9 35.0 - 47.0 %    MCV 89.8 80.0 - 99.0 FL    MCH 30.3 26.0 - 34.0 PG    MCHC 33.8 30.0 - 36.5 g/dL    RDW 15.1 (H) 11.5 - 14.5 %    PLATELET 952 499 - 337 K/uL    MPV 9.7 8.9 - 12.9 FL    NRBC 0.0 0  WBC    ABSOLUTE NRBC 0.00 0.00 - 0.01 K/uL    NEUTROPHILS 72 32 - 75 %    LYMPHOCYTES 15 12 - 49 %    MONOCYTES 6 5 - 13 %    EOSINOPHILS 4 0 - 7 %    BASOPHILS 1 0 - 1 %    IMMATURE GRANULOCYTES 2 (H) 0.0 - 0.5 %    ABS. NEUTROPHILS 5.8 1.8 - 8.0 K/UL    ABS. LYMPHOCYTES 1.2 0.8 - 3.5 K/UL    ABS. MONOCYTES 0.5 0.0 - 1.0 K/UL    ABS. EOSINOPHILS 0.3 0.0 - 0.4 K/UL    ABS. BASOPHILS 0.1 0.0 - 0.1 K/UL    ABS. IMM. GRANS. 0.2 (H) 0.00 - 0.04 K/UL    DF AUTOMATED         Assessment/Plan:      Principal Problem:    Acute colitis (1/31/2023)    Active Problems:    GERD (gastroesophageal reflux disease) (11/27/2017)       ___________________________________________________  PLAN:    Risk of deterioration:  []Low    []Moderate  []High    1. IV Levaquin and Flagyl for colitis  2. Repeat CT abdomen pelvis  3. IV hydration with D5 half-normal saline  4.  N.p.o. except sips and chips  5. Imodium as needed  6. I will add probiotic  7. As needed Zofran  8. Replete potassium which is low on admission 2.9  9. Check magnesium level  10. Continue Synthroid 0.88 daily  11   Continue Protonix but change to IV  12. Continue Ativan 0.5 nightly as needed 13.   Continue home eyedrops  14   Continue Zyrtec 10 mg daily  15.   Continue folic acid 2 mg daily    Prophylaxis:  [x]Lovenox  []Coumadin  []Hep SQ  []SCDs  []H2B/PPI    Disposition:  [x]Home w/ Family   []HH PT,OT,RN   []SNF/LTC   []SAH/Rehab    Discussed Code Status:    [x]Full Code      []DNR     ___________________________________________________    Care Plan discussed with:    [x]Patient   [x]Family    []ED Care Manager  [x]ED Doc   []Specialist :     ___________________________________________________  Admitting Physician: Juju Delaney MD

## 2023-01-31 NOTE — PROGRESS NOTES
Subjective:   Nadine Wallace is a 78 y.o. female      Chief Complaint   Patient presents with    ED Follow-up     Urgent care follow up        History of present illness: She presents today complaining of severe abdominal pain and diarrhea multiple times per day that has been present now for about 8 days. She initially had nausea vomiting but that has resolved. She notes no fevers or chills. [facility] 2 days ago at which time she had a CBC obtained revealing a white count of 13,100 and also had a CT of her abdomen consistent with an acute colitis. She was empirically placed on Cipro and Flagyl which she has been taking religiously. She also had 2 L of IV fluid while she was in the emergency room secondary to dehydration. Since she had was released from there 2 days ago she has been taking very little orally and persist with frequent watery diarrhea. She does not have any nausea but just does not have an appetite. She has noted no recent fevers or chills. She is found here in the office to have marked tenderness in the lower abdomen increased in the left lower quadrant and felt prudent admitted to the hospital for more aggressive treatment of her colitis with IV therapy. Certainly ischemic colitis is a consideration. Patient Active Problem List   Diagnosis Code    DJD (degenerative joint disease) of cervical spine M47.812    Alcohol screening Z13.39    Acquired hypothyroidism E03.9    IBS (irritable bowel syndrome) K58.9    Long-term use of immunosuppressant medication Z79.60    Iron deficiency anemia D50.9    MIAN (generalized anxiety disorder) F41.1    Rosacea blepharoconjunctivitis H10.829    Insomnia G47.00    Psoriasis L40.9    Dyslipidemia E78.5    Epigastric pain R10.13    GERD (gastroesophageal reflux disease) K21.9    Primary osteoarthritis involving multiple joints M15.9    Depression F32. A    Vitamin D deficiency E55.9    Acute colitis K52.9      Past Medical History:   Diagnosis Date    Acute recurrent pansinusitis 2/23/2018    Annual physical exam 8/3/2017    Anxiety 8/3/2017    Arthralgia 8/3/2017    Arthritis     Arthritis of right hip 8/3/2017    Arthritis, hip 8/3/2017    [address]immune disease [address] [address]     psoriasis    Avascular necrosis of femoral head ([facility] left 8/3/2017    Bone loss 8/3/2017    Cancer ([facility]     endometrial    Chronic eczematous otitis externa of both ears 8/24/2017    Contusion of left knee, initial encounter 8/3/2017    Degenerative arthritis of lumbar spine 8/3/2017    Depression     Elevated liver function tests 8/3/2017    MIAN (generalized anxiety disorder) 8/3/2017    GERD (gastroesophageal reflux disease)     Glaucoma     Glaucoma 8/3/2017    Headache, acute 8/3/2017    Herpes zoster without complication 34/70/9696    History of colonoscopy with polypectomy 8/3/2017    History of endometrial cancer 8/3/2017    Hyperlipidemia 8/3/2017    Hypothyroid 8/3/2017    IBS (irritable bowel syndrome) 8/3/2017    AMPARO (iron deficiency anemia) 8/3/2017    Insomnia 8/3/2017    Labral tear of hip, degenerative 10/24/2017    Long-term use of immunosuppressant medication 8/3/2017    Medication side effect 8/3/2017    Melena 8/3/2017    Monilial vaginitis 8/24/2017    Muscle spasm 8/3/2017    Osteoporosis 8/3/2017    Other ill-defined conditions(799.89)     high cholesterol    Other ill-defined conditions(799.89)     psoriasis    Post-menopausal 8/3/2017    Psoriasis 8/3/2017    Pyuria, sterile 8/3/2017    Rash 8/3/2017    Rosacea blepharoconjunctivitis 8/3/2017    Sciatica 8/3/2017    Somatic dysfunction of cervical region 8/3/2017    Spondylosis of cervical region without myelopathy or radiculopathy 8/3/2017    Thrush 8/3/2017    Thyroid disease     Urticaria 8/3/2017      Allergies   Allergen Reactions    Cephalosporins Shortness of Breath    Codeine Nausea and Vomiting    Pcn [Penicillins] Rash      Family History   Problem Relation Age of Onset    Heart Disease Mother     Other Mother         kidney stones    Diabetes Father     Cancer Sister         colon cancer    Heart Disease Brother     Diabetes Brother     Other Brother         kidney stones    Colon Cancer Brother     Heart Disease Brother     Other Brother         kidney stones    Heart Disease Brother     Heart Disease Brother     Cancer Brother         melanoma      Social History     Socioeconomic History    Marital status:      Spouse name: Not on file    Number of children: Not on file    Years of education: Not on file    Highest education level: Not on file   Occupational History    Not on file   Tobacco Use    Smoking status: Former     Types: Cigarettes     Quit date: 1979     Years since quittin.0    Smokeless tobacco: Never   Vaping Use    Vaping Use: Never used   Substance and Sexual Activity    Alcohol use: Yes     Alcohol/week: 7.0 standard drinks     Types: 7 Glasses of wine per week    Drug use: No    Sexual activity: Not on file   Other Topics Concern    Not on file   Social History Narrative    Not on file     Social Determinants of Health     Financial Resource Strain: Not on file   Food Insecurity: Not on file   Transportation Needs: Not on file   Physical Activity: Not on file   Stress: Not on file   Social Connections: Not on file   Intimate Partner Violence: Not on file   Housing Stability: Not on file     Prior to Admission medications    Medication Sig Start Date End Date Taking? Authorizing Provider   Cetirizine (ZyrTEC) 10 mg cap Take  by mouth. Yes Provider, Historical   pantoprazole (PROTONIX) 40 mg tablet TAKE 1 TABLET BY MOUTH DAILY 11/3/22  Yes Flaquita Melendez MD   ciprofloxacin HCl (CIPRO) 250 mg tablet Take 1 Tablet by mouth two (2) times a day. 22  Yes Flaquita Melendez MD   acetaminophen (TYLENOL) 500 mg tablet Take 500 mg by mouth every six (6) hours as needed for Pain.    Yes Provider, Historical   folic acid (FOLVITE) 1 mg tablet TAKE 2 TABLETS BY MOUTH DAILY 10/18/22  Yes Arturo Herr MD   levothyroxine (SYNTHROID) 88 mcg tablet TAKE 1 TABLET BY MOUTH DAILY 7/9/21  Yes Yolanda Aguilera NP   LORazepam (Ativan) 0.5 mg tablet Take 1 Tablet by mouth nightly as needed (sleep). Max Daily Amount: 0.5 mg. 7/2/21  Yes Willie Aguilera NP   dorzolamide-timoloL (COSOPT) 22.3-6.8 mg/mL ophthalmic solution Cosopt 22.3 mg-6.8 mg/mL eye drops   Prescribed by non [facility] MD 5/12/17  Yes Provider, Historical   clobetasol (TEMOVATE) 0.05 % ointment two (2) times daily as needed. 10/23/18  Yes Provider, Historical   travoprost (TRAVATAN Z) 0.004 % ophthalmic solution Administer 1 Drop to both eyes nightly. Yes Provider, Historical        Review of Systems              Constitutional:  She denies fever, weight loss, sweats or fatigue. EYES: No blurred or double vision,               ENT: no nasal congestion, no headache or dizziness. No difficulty with               swallowing, taste, speech or smell. Respiratory:  No cough, wheezing or shortness of breath. No sputum production. Cardiac:  Denies chest pain, palpitations, unexplained indigestion, syncope, edema, PND or orthopnea. GI: Positive abdominal pain with diarrhea now for about 8 days. She initially had some nausea vomiting. She has little appetite and little p.o. intake. :  No frequency or dysuria. Denies incontinence or sexual dysfunction. Extremities:  No joint pain, stiffness or swelling  Back:.no pain or soreness  Skin:  No recent rashes or mole changes. Neurological:  No numbness, tingling, burning paresthesias or loss of motor strength. No syncope, dizziness, frequent headaches or memory loss.   Hematologic:  No easy bruising  Lymphatic: No lymph node enlargement    Objective:     Vitals:    01/31/23 1045   BP: 126/68   Pulse: 77   Temp: 98 °F (36.7 °C)   TempSrc: Oral   SpO2: 97%   Weight: 122 lb (55.3 kg)   Height: 4' 11\" (1.499 m)   PainSc:   0 - No pain       Body mass index is 24.64 kg/m². Physical Examination:              General Appearance:  Well-developed, well-nourished, no acute distress. HEENT:      Ears:  The TMs and ear canals were clear. Eyes:  The pupillary responses were normal.  Extraocular muscle function intact. Lids and conjunctiva not injected. Funduscopic exam revealed sharp disc margins. Nares: Clear w/o edema or erythema  Pharynx:  Clear with teeth in good repair. No masses were noted. Neck:  Supple without thyromegaly or adenopathy. No JVD noted. No carotid                bruits. Lungs:  Clear to auscultation and percussion. Cardiac:  Regular rate and rhythm without murmur. PMI not displaced. No gallop, rub or click. Abdominal: Soft, bowel sounds hyperactive, marked abdominal tenderness more in the lower quadrants with extreme tenderness and rebound in the left lower quadrant  Extremities:  No clubbing, cyanosis or edema. Skin:  No rash or unusual mole changes noted. Lymph Nodes:  None felt in the cervical, supraclavicular, axillary or inguinal region. Neurological: . DTRs 2+ and symmetric. Station and gait normal.   Hematologic:   No purpura or petechiae        Assessment/Plan:         1. Acute colitis        Impressions/Plan:  Impression  1. Acute colitis this is failed outpatient treatment. She needs inpatient treatment with IV antibiotics and IV fluids but the bowel at rest.  See admit note for further details    Orders Placed This Encounter    Cetirizine (ZyrTEC) 10 mg cap       Follow-up and Dispositions    Return TBD. No results found for any visits on 01/31/23. Christin Keene MD    The patient was given after the visit summary the patient verbalized an understanding of the plans and problems as explained.

## 2023-01-31 NOTE — CONSULTS
GI CONSULTATION NOTE  Nathaly Bernstein, NP  [phone] NP in-hospital cell phone M-F until 4:30  After 5pm or on weekends, please call  for physician on call    NAME: Nadine Dasilva   :  1940   MRN:  [id number]   Attending:  Dr Zuleika Kerr  Primary GI:  Dr Lois Martin  Date/Time:  2023 3:17 PM  Assessment:   Diffuse Colitis  Abnormal CT scan  - 1 week of profuse diarrhea with nocturnal symptoms   - Chills without a fever on Wednesday night  - Some abdominal pain around BM but then resolves  - Diarrhea, non bloody, about 7-10x per day, always postprandially  - Decreased appetite due to lack of desire to have a BM  - NO nausea or vomiting  - No weight loss  - No hx of C.diff  - WBC here 8.0, Hgb 12.8    Seen at [facility] ER previously noting WBC 13K and diffuse colitis on CT scan at that  time, she has been taking PO flagyl and cipro but continues to feel terrible. CT abd/pel W contrast 23 : Diffuse colitis. CLN 2019 : normal colonic mucosa throughout  diverticulosis,  Mild in degree, involving the sigmoid  hemorrhoids internal, Moderate in size. Bx unremarkable. GERD  ANxiety  Hypothyroidism    Pt is a retired Nurse  Plan:   Rule out infectious etiology - stool studies have already been ordered  Clear liquid diet  Use barrier cream to bottom  IV Antibiotics as you are doing  Rest per primary team.     Plan discussed with Dr Silvestre Peterson. Thank you for consultation. Subjective:     HISTORY OF PRESENT ILLNESS:     Nadine Dasilva is an 78 y.o.  female who we are asked to see for complaint of colitis. Medical history includes arthritis, GERD, IBS, AMPARO, anxiety. Pt came to ER today after seeing Dr Zuleika Kerr today. She has been having a week of profuse non bloody diarrhea with abdominal pain and lack of appetite. She was seen at [address] [place] [address] ER and found to have diffuse colitis but she has since failed oral antibiotics (cipro/flagyl).  She is still not having great intake and continues to feel poorly. Never had any bloody stools. No weight loss. She saw Dr Yan Vinson in follow up today from ER visit and he recommended hospitalization to assist with resolution of symptoms. Colonoscopy last done in 2019 for diarrhea but was unremarkable with bx negative for microscopic colitis.       Past Medical History:   Diagnosis Date    Acute recurrent pansinusitis 2/23/2018    Annual physical exam 8/3/2017    Anxiety 8/3/2017    Arthralgia 8/3/2017    Arthritis     Arthritis of right hip 8/3/2017    Arthritis, hip 8/3/2017    Autoimmune disease ([address]     psoriasis    Avascular necrosis of femoral head ([address] left 8/3/2017    Bone loss 8/3/2017    Cancer ([facility]     endometrial    Chronic eczematous otitis externa of both ears 8/24/2017    Contusion of left knee, initial encounter 8/3/2017    Degenerative arthritis of lumbar spine 8/3/2017    Depression     Elevated liver function tests 8/3/2017    MIAN (generalized anxiety disorder) 8/3/2017    GERD (gastroesophageal reflux disease)     Glaucoma     Glaucoma 8/3/2017    Headache, acute 8/3/2017    Herpes zoster without complication 79/61/3816    History of colonoscopy with polypectomy 8/3/2017    History of endometrial cancer 8/3/2017    Hyperlipidemia 8/3/2017    Hypothyroid 8/3/2017    IBS (irritable bowel syndrome) 8/3/2017    AMPARO (iron deficiency anemia) 8/3/2017    Insomnia 8/3/2017    Labral tear of hip, degenerative 10/24/2017    Long-term use of immunosuppressant medication 8/3/2017    Medication side effect 8/3/2017    Melena 8/3/2017    Monilial vaginitis 8/24/2017    Muscle spasm 8/3/2017    Osteoporosis 8/3/2017    Other ill-defined conditions(799.89)     high cholesterol    Other ill-defined conditions(799.89)     psoriasis    Post-menopausal 8/3/2017    Psoriasis 8/3/2017    Pyuria, sterile 8/3/2017    Rash 8/3/2017    Rosacea blepharoconjunctivitis 8/3/2017    Sciatica 8/3/2017    Somatic dysfunction of cervical region 8/3/2017    Spondylosis of cervical region without myelopathy or radiculopathy 8/3/2017    Thrush 8/3/2017    Thyroid disease     Urticaria 8/3/2017      Past Surgical History:   Procedure Laterality Date    COLONOSCOPY N/A 2019    COLONOSCOPY performed by Dimitris Kelly MD at [facility] ENDOSCOPY    COLONOSCOPY,DIAGNOSTIC  2019         COLORECTAL SCRN; HI RISK IND  2014         HX APPENDECTOMY      HX GYN      hysterectomy    HX HEENT      bilateral ear    HX HEENT      LASIK    HX ORTHOPAEDIC  -    lumbar laminectomy    HX OTHER SURGICAL Bilateral     cataract extraction with lens implant    HX TONSILLECTOMY      MT UNLISTED NEUROLOGICAL/NEUROMUSCULAR DX PX  2011    spinal fusion     Social History     Tobacco Use    Smoking status: Former     Types: Cigarettes     Quit date: 1979     Years since quittin.0    Smokeless tobacco: Never   Substance Use Topics    Alcohol use: Yes     Alcohol/week: 7.0 standard drinks     Types: 7 Glasses of wine per week      Family History   Problem Relation Age of Onset    Heart Disease Mother     Other Mother         kidney stones    Diabetes Father     Cancer Sister         colon cancer    Heart Disease Brother     Diabetes Brother     Other Brother         kidney stones    Colon Cancer Brother     Heart Disease Brother     Other Brother         kidney stones    Heart Disease Brother     Heart Disease Brother     Cancer Brother         melanoma      Allergies   Allergen Reactions    Cephalosporins Shortness of Breath    Codeine Nausea and Vomiting    Pcn [Penicillins] Rash      Prior to Admission medications    Medication Sig Start Date End Date Taking? Authorizing Provider   Cetirizine (ZyrTEC) 10 mg cap Take  by mouth. Provider, Historical   pantoprazole (PROTONIX) 40 mg tablet TAKE 1 TABLET BY MOUTH DAILY 11/3/22   Sujata Robb MD   acetaminophen (TYLENOL) 500 mg tablet Take 500 mg by mouth every six (6) hours as needed for Pain.     Provider, Historical folic acid (FOLVITE) 1 mg tablet TAKE 2 TABLETS BY MOUTH DAILY 10/18/22   Eric Parrish MD   levothyroxine (SYNTHROID) 88 mcg tablet TAKE 1 TABLET BY MOUTH DAILY 7/9/21   Alexandria Aguilera NP   LORazepam (Ativan) 0.5 mg tablet Take 1 Tablet by mouth nightly as needed (sleep). Max Daily Amount: 0.5 mg. 7/2/21   Alexandria Aguilera NP   dorzolamide-timoloL (COSOPT) 22.3-6.8 mg/mL ophthalmic solution Cosopt 22.3 mg-6.8 mg/mL eye drops   Prescribed by non 606/426 Marion Del Rosario MD 5/12/17   Provider, Historical   clobetasol (TEMOVATE) 0.05 % ointment two (2) times daily as needed. 10/23/18   Provider, Historical   travoprost (TRAVATAN Z) 0.004 % ophthalmic solution Administer 1 Drop to both eyes nightly. Provider, Historical       Patient Active Problem List   Diagnosis Code    DJD (degenerative joint disease) of cervical spine M47.812    Alcohol screening Z13.39    Acquired hypothyroidism E03.9    IBS (irritable bowel syndrome) K58.9    Long-term use of immunosuppressant medication Z79.60    Iron deficiency anemia D50.9    MIAN (generalized anxiety disorder) F41.1    Rosacea blepharoconjunctivitis H10.829    Insomnia G47.00    Psoriasis L40.9    Dyslipidemia E78.5    Epigastric pain R10.13    GERD (gastroesophageal reflux disease) K21.9    Primary osteoarthritis involving multiple joints M15.9    Depression F32. A    Vitamin D deficiency E55.9    Acute colitis K52.9       REVIEW OF SYSTEMS:    Constitutional: negative fever, negative chills, negative weight loss  Eyes:   negative visual changes  ENT:   negative sore throat, tongue or lip swelling   Respiratory:  negative cough, negative dyspnea  Cards:  negative for chest pain, palpitations, lower extremity edema  GI:   See HPI  :  negative for frequency, dysuria  Integument:  negative for rash and pruritus  Heme:  negative for easy bruising and gum/nose bleeding  Musculoskel: negative for myalgias,  back pain  Neuro: negative for headaches, dizziness, vertigo  Psych: negative for feelings of anxiety, depression     Pertinent Positives: weakness      Objective:   VITALS:    Visit Vitals  /80   Pulse 74   Temp 98.2 °F (36.8 °C)   Resp 16   Ht 4' 11\" (1.499 m)   Wt 55.3 kg (121 lb 14.6 oz)   SpO2 98%   BMI 24.62 kg/m²       PHYSICAL EXAM:   General:          Alert, WD, WN, cooperative, no distress, appears stated age. Head:               Normocephalic, without obvious abnormality, atraumatic. Eyes:               Conjunctivae clear and pale, anicteric sclerae. Pupils are equal  Nose:               Nares normal. No drainage or sinus tenderness. Throat:             Lips, mucosa, and tongue normal.  No Thrush  Neck:               Supple, symmetrical,  no adenopathy, thyroid: non tender  Back:               Symmetric,  No CVA tenderness. Lungs:             CTA bilaterally. No wheezing/rhonchi/rales. Chest wall:      No tenderness or deformity. No Accessory muscle use. Heart:              Regular rate and rhythm,  no murmur, rub or gallop. Abdomen:        Soft, +tenderness. Not distended. Bowel sounds normal. No masses  Extremities:     Atraumatic, No cyanosis. No edema. No clubbing  Skin:                Texture, turgor normal. No rashes/lesions/jaundice  Lymph:            Cervical, supraclavicular normal.  Psych:             Good insight. Not depressed. Not anxious or agitated. Neurologic:      EOMs intact. No facial asymmetry. No aphasia or slurred speech. Normal                        strength, A/O X 3.      LAB DATA REVIEWED:    Recent Results (from the past 24 hour(s))   METABOLIC PANEL, COMPREHENSIVE    Collection Time: 01/31/23 11:51 AM   Result Value Ref Range    Sodium 141 136 - 145 mmol/L    Potassium 2.9 (L) 3.5 - 5.1 mmol/L    Chloride 110 (H) 97 - 108 mmol/L    CO2 25 21 - 32 mmol/L    Anion gap 6 5 - 15 mmol/L    Glucose 98 65 - 100 mg/dL    BUN 7 6 - 20 MG/DL    Creatinine 1.03 (H) 0.55 - 1.02 MG/DL    BUN/Creatinine ratio 7 (L) 12 - 20 eGFR 54 (L) >60 ml/min/1.73m2    Calcium 9.0 8.5 - 10.1 MG/DL    Bilirubin, total 0.4 0.2 - 1.0 MG/DL    ALT (SGPT) 24 12 - 78 U/L    AST (SGOT) 29 15 - 37 U/L    Alk. phosphatase 148 (H) 45 - 117 U/L    Protein, total 6.3 (L) 6.4 - 8.2 g/dL    Albumin 2.8 (L) 3.5 - 5.0 g/dL    Globulin 3.5 2.0 - 4.0 g/dL    A-G Ratio 0.8 (L) 1.1 - 2.2     LIPASE    Collection Time: 01/31/23 11:51 AM   Result Value Ref Range    Lipase 59 (L) 73 - 393 U/L   URINALYSIS W/ REFLEX CULTURE    Collection Time: 01/31/23 11:51 AM    Specimen: Miscellaneous sample; Urine    Urine specimen   Result Value Ref Range    Color DARK YELLOW      Appearance CLEAR CLEAR      Specific gravity 1.022      pH (UA) 5.5 5.0 - 8.0      Protein 30 (A) NEG mg/dL    Glucose Negative NEG mg/dL    Ketone 15 (A) NEG mg/dL    Blood Negative NEG      Urobilinogen 1.0 0.2 - 1.0 EU/dL    Nitrites Negative NEG      Leukocyte Esterase SMALL (A) NEG      WBC 5-10 0 - 4 /hpf    RBC 0-5 0 - 5 /hpf    Epithelial cells MODERATE (A) FEW /lpf    Bacteria Negative NEG /hpf    UA:UC IF INDICATED CULTURE NOT INDICATED BY UA RESULT CNI     CBC WITH AUTOMATED DIFF    Collection Time: 01/31/23 11:51 AM   Result Value Ref Range    WBC 8.0 3.6 - 11.0 K/uL    RBC 4.22 3.80 - 5.20 M/uL    HGB 12.8 11.5 - 16.0 g/dL    HCT 37.9 35.0 - 47.0 %    MCV 89.8 80.0 - 99.0 FL    MCH 30.3 26.0 - 34.0 PG    MCHC 33.8 30.0 - 36.5 g/dL    RDW 15.1 (H) 11.5 - 14.5 %    PLATELET 183 730 - 724 K/uL    MPV 9.7 8.9 - 12.9 FL    NRBC 0.0 0  WBC    ABSOLUTE NRBC 0.00 0.00 - 0.01 K/uL    NEUTROPHILS 72 32 - 75 %    LYMPHOCYTES 15 12 - 49 %    MONOCYTES 6 5 - 13 %    EOSINOPHILS 4 0 - 7 %    BASOPHILS 1 0 - 1 %    IMMATURE GRANULOCYTES 2 (H) 0.0 - 0.5 %    ABS. NEUTROPHILS 5.8 1.8 - 8.0 K/UL    ABS. LYMPHOCYTES 1.2 0.8 - 3.5 K/UL    ABS. MONOCYTES 0.5 0.0 - 1.0 K/UL    ABS. EOSINOPHILS 0.3 0.0 - 0.4 K/UL    ABS. BASOPHILS 0.1 0.0 - 0.1 K/UL    ABS. IMM.  GRANS. 0.2 (H) 0.00 - 0.04 K/UL    DF AUTOMATED     BILIRUBIN, CONFIRM    Collection Time: 01/31/23 11:51 AM   Result Value Ref Range    Bilirubin UA, confirm Negative NEG     MAGNESIUM    Collection Time: 01/31/23 11:51 AM   Result Value Ref Range    Magnesium 1.8 1.6 - 2.4 mg/dL       IMAGING RESULTS:  I have personally reviewed the imaging reports    DATE: 1/31/23:  EXAM: CT ABD PELV W CONT     INDICATION: Colitis     COMPARISON: November 2017      CONTRAST: 100 mL of Isovue-370. ORAL CONTRAST: None     TECHNIQUE:   Following the uneventful intravenous administration of contrast, thin axial  images were obtained through the abdomen and pelvis. Coronal and sagittal  reconstructions were generated. CT dose reduction was achieved through use of a  standardized protocol tailored for this examination and automatic exposure  control for dose modulation. FINDINGS:   LOWER THORAX: No significant abnormality in the incidentally imaged lower chest.  LIVER: Enhancement in the superior aspect of the right lobe of the liver once  again demonstrated slightly enlarged. BILIARY TREE: Gallbladder is within normal limits. CBD is not dilated. SPLEEN: within normal limits. PANCREAS: No mass or ductal dilatation. ADRENALS: Unremarkable. KIDNEYS: No mass, calculus, or hydronephrosis. STOMACH: Unremarkable. SMALL BOWEL: No dilatation or wall thickening. COLON: Diffuse colonic wall thickening suggest strongly a nonspecific colitis. There is some mild pericolonic fat stranding, there is no free air or free fluid  obstruction  APPENDIX: Appendectomy  PERITONEUM: No ascites or pneumoperitoneum. RETROPERITONEUM: No lymphadenopathy or aortic aneurysm. REPRODUCTIVE ORGANS: Hysterectomy laminectomy  URINARY BLADDER: No mass or calculus. BONES: No destructive bone lesion. ABDOMINAL WALL: No mass or hernia. ADDITIONAL COMMENTS: N/A     IMPRESSION  Diffuse colitis.     Total time spent with patient: 50 minutes ________________________________________________________________________  Care Plan discussed with:  Patient y   Family     RN y              Consultant:  [facility] spoke to [facility]  1/31/2023:  ________________________________________________________________________    ___________________________________________________  Consulting Provider:  Rigoberto Patel NP      1/31/2023  3:17 PM

## 2023-01-31 NOTE — ED NOTES
TRANSFER - OUT REPORT:    Verbal report given to Armando Matson RN(name) on June B Ina Marcial  being transferred to CDU(unit) for routine progression of care       Report consisted of patients Situation, Background, Assessment and   Recommendations(SBAR). Information from the following report(s) SBAR, Kardex, ED Summary, and MAR was reviewed with the receiving nurse. Lines:   Peripheral IV 01/31/23 Left Antecubital (Active)       Peripheral IV 01/31/23 Right (Active)   Site Assessment Clean, dry, & intact 01/31/23 1402   Phlebitis Assessment 0 01/31/23 1402   Infiltration Assessment 0 01/31/23 1402   Dressing Status Clean, dry, & intact 01/31/23 1402   Dressing Type 4 X 4 01/31/23 1402   Hub Color/Line Status Pink 01/31/23 1402        Opportunity for questions and clarification was provided.       Patient transported with:   [employer]

## 2023-02-01 LAB
ANION GAP SERPL CALC-SCNC: 8 MMOL/L (ref 5–15)
BUN SERPL-MCNC: 3 MG/DL (ref 6–20)
BUN/CREAT SERPL: 4 (ref 12–20)
C COLI+JEJUNI TUF STL QL NAA+PROBE: NEGATIVE
C DIFF GDH STL QL: NEGATIVE
C DIFF TOX A+B STL QL IA: NEGATIVE
CALCIUM SERPL-MCNC: 8.2 MG/DL (ref 8.5–10.1)
CHLORIDE SERPL-SCNC: 112 MMOL/L (ref 97–108)
CO2 SERPL-SCNC: 21 MMOL/L (ref 21–32)
CREAT SERPL-MCNC: 0.85 MG/DL (ref 0.55–1.02)
EC STX1+STX2 GENES STL QL NAA+PROBE: NEGATIVE
ERYTHROCYTE [DISTWIDTH] IN BLOOD BY AUTOMATED COUNT: 15 % (ref 11.5–14.5)
ETEC ELTA+ESTB GENES STL QL NAA+PROBE: NEGATIVE
GLUCOSE SERPL-MCNC: 142 MG/DL (ref 65–100)
HCT VFR BLD AUTO: 33.2 % (ref 35–47)
HGB BLD-MCNC: 11.4 G/DL (ref 11.5–16)
INTERPRETATION: NORMAL
MAGNESIUM SERPL-MCNC: 1.7 MG/DL (ref 1.6–2.4)
MCH RBC QN AUTO: 30.6 PG (ref 26–34)
MCHC RBC AUTO-ENTMCNC: 34.3 G/DL (ref 30–36.5)
MCV RBC AUTO: 89.2 FL (ref 80–99)
NRBC # BLD: 0 K/UL (ref 0–0.01)
NRBC BLD-RTO: 0 PER 100 WBC
P SHIGELLOIDES DNA STL QL NAA+PROBE: NEGATIVE
PLATELET # BLD AUTO: 218 K/UL (ref 150–400)
PMV BLD AUTO: 9.6 FL (ref 8.9–12.9)
POTASSIUM SERPL-SCNC: 2.8 MMOL/L (ref 3.5–5.1)
RBC # BLD AUTO: 3.72 M/UL (ref 3.8–5.2)
SALMONELLA SP SPAO STL QL NAA+PROBE: NEGATIVE
SHIGELLA SP+EIEC IPAH STL QL NAA+PROBE: NEGATIVE
SODIUM SERPL-SCNC: 141 MMOL/L (ref 136–145)
V CHOL+PARA+VUL DNA STL QL NAA+NON-PROBE: NEGATIVE
WBC # BLD AUTO: 6.5 K/UL (ref 3.6–11)
Y ENTEROCOL DNA STL QL NAA+NON-PROBE: NEGATIVE

## 2023-02-01 PROCEDURE — 65270000029 HC RM PRIVATE

## 2023-02-01 PROCEDURE — 83735 ASSAY OF MAGNESIUM: CPT

## 2023-02-01 PROCEDURE — 85027 COMPLETE CBC AUTOMATED: CPT

## 2023-02-01 PROCEDURE — 74011000250 HC RX REV CODE- 250: Performed by: INTERNAL MEDICINE

## 2023-02-01 PROCEDURE — 80048 BASIC METABOLIC PNL TOTAL CA: CPT

## 2023-02-01 PROCEDURE — 87324 CLOSTRIDIUM AG IA: CPT

## 2023-02-01 PROCEDURE — 74011250637 HC RX REV CODE- 250/637: Performed by: INTERNAL MEDICINE

## 2023-02-01 PROCEDURE — C9113 INJ PANTOPRAZOLE SODIUM, VIA: HCPCS | Performed by: INTERNAL MEDICINE

## 2023-02-01 PROCEDURE — 74011250636 HC RX REV CODE- 250/636: Performed by: INTERNAL MEDICINE

## 2023-02-01 PROCEDURE — 36415 COLL VENOUS BLD VENIPUNCTURE: CPT

## 2023-02-01 PROCEDURE — 87506 IADNA-DNA/RNA PROBE TQ 6-11: CPT

## 2023-02-01 PROCEDURE — 89055 LEUKOCYTE ASSESSMENT FECAL: CPT

## 2023-02-01 PROCEDURE — 99233 SBSQ HOSP IP/OBS HIGH 50: CPT | Performed by: INTERNAL MEDICINE

## 2023-02-01 RX ORDER — POTASSIUM CHLORIDE 7.45 MG/ML
10 INJECTION INTRAVENOUS
Status: DISPENSED | OUTPATIENT
Start: 2023-02-01 | End: 2023-02-01

## 2023-02-01 RX ORDER — LEVOFLOXACIN 5 MG/ML
750 INJECTION, SOLUTION INTRAVENOUS EVERY 24 HOURS
Status: DISCONTINUED | OUTPATIENT
Start: 2023-02-01 | End: 2023-02-04

## 2023-02-01 RX ORDER — POTASSIUM CHLORIDE 7.45 MG/ML
10 INJECTION INTRAVENOUS
Status: COMPLETED | OUTPATIENT
Start: 2023-02-01 | End: 2023-02-02

## 2023-02-01 RX ADMIN — METRONIDAZOLE 500 MG: 500 INJECTION, SOLUTION INTRAVENOUS at 14:39

## 2023-02-01 RX ADMIN — POTASSIUM CHLORIDE, DEXTROSE MONOHYDRATE AND SODIUM CHLORIDE 100 ML/HR: 150; 5; 450 INJECTION, SOLUTION INTRAVENOUS at 01:59

## 2023-02-01 RX ADMIN — LEVOTHYROXINE SODIUM 88 MCG: 0.09 TABLET ORAL at 06:00

## 2023-02-01 RX ADMIN — POTASSIUM CHLORIDE 10 MEQ: 7.46 INJECTION, SOLUTION INTRAVENOUS at 13:18

## 2023-02-01 RX ADMIN — CETIRIZINE HYDROCHLORIDE 10 MG: 10 TABLET, FILM COATED ORAL at 08:30

## 2023-02-01 RX ADMIN — METRONIDAZOLE 500 MG: 500 INJECTION, SOLUTION INTRAVENOUS at 22:31

## 2023-02-01 RX ADMIN — LEVOFLOXACIN 750 MG: 5 INJECTION, SOLUTION INTRAVENOUS at 14:25

## 2023-02-01 RX ADMIN — POTASSIUM CHLORIDE 10 MEQ: 7.46 INJECTION, SOLUTION INTRAVENOUS at 10:13

## 2023-02-01 RX ADMIN — POTASSIUM CHLORIDE 10 MEQ: 7.46 INJECTION, SOLUTION INTRAVENOUS at 14:25

## 2023-02-01 RX ADMIN — POTASSIUM CHLORIDE, DEXTROSE MONOHYDRATE AND SODIUM CHLORIDE 100 ML/HR: 150; 5; 450 INJECTION, SOLUTION INTRAVENOUS at 22:35

## 2023-02-01 RX ADMIN — SODIUM CHLORIDE 40 MG: 9 INJECTION, SOLUTION INTRAMUSCULAR; INTRAVENOUS; SUBCUTANEOUS at 08:30

## 2023-02-01 RX ADMIN — METRONIDAZOLE 500 MG: 500 INJECTION, SOLUTION INTRAVENOUS at 06:00

## 2023-02-01 RX ADMIN — FOLIC ACID 2 MG: 1 TABLET ORAL at 08:30

## 2023-02-01 RX ADMIN — Medication 1 CAPSULE: at 08:32

## 2023-02-01 RX ADMIN — POTASSIUM CHLORIDE, DEXTROSE MONOHYDRATE AND SODIUM CHLORIDE 100 ML/HR: 150; 5; 450 INJECTION, SOLUTION INTRAVENOUS at 10:13

## 2023-02-01 RX ADMIN — POTASSIUM CHLORIDE 10 MEQ: 7.46 INJECTION, SOLUTION INTRAVENOUS at 12:20

## 2023-02-01 RX ADMIN — POTASSIUM CHLORIDE 10 MEQ: 7.46 INJECTION, SOLUTION INTRAVENOUS at 08:30

## 2023-02-01 NOTE — PROGRESS NOTES
GI PROGRESS NOTE  Jesus Vincenzo, TAURUS  [phone] NP in-hospital cell phone M-F until 4:30  After 5pm or on weekends, please call  for physician on call    Sandy Hyde :1940 GCR:[id number]   ATTG: Dr Joyce Castaneda  PCP: Jaclyn Boyd MD  Date/Time:  2023 1:56 PM     Primary GI : Dr Linda Grayson:     Diffuse Colitis  Abnormal CT scan  - 1 week of profuse diarrhea but none overnight, one stool this AM.    - Abdominal pain improved today  - non bloody stool today  - Decreased appetite due to lack of desire to have a BM  - No hx of C.diff  CT abd/pel W contrast 23 : Diffuse colitis. CLN 2019 : normal colonic mucosa throughout  diverticulosis,  Mild in degree, involving the sigmoid  hemorrhoids internal, Moderate in size. Bx unremarkable. GERD  ANxiety  Hypothyroidism     Pt is a retired Nurse     Plan:     Rule out infectious etiology - stool was in room at bedside but now it was sent to lab - in process  Advance to full liquid diet  Use barrier cream to bottom - ordered  IV Antibiotics   Rest per primary team.      Plan discussed with Dr Shira Byrd. Subjective:   Discussed with RN events overnight. Resting in room, feeling better but didn't get much sleep. Less abdominal discomfort without any BMs overnight. Complaint Y/N Description   Abdominal Pain n    Hematemesis n    Hematochezia n    Melena n    Constipation n    Diarrhea n    Dyspepsia n    Dysphagia n    Jaundiced n    Nausea/vomiting n      Review of Systems:  Symptom Y/N Comments  Symptom Y/N Comments   Fever/Chills    Chest Pain     Cough    Headaches     Sputum    Joint Pain     SOB/WALTON    Pruritis/Rash     Tolerating Diet y clears  Other       Could NOT obtain due to:      Objective:   VITALS:   Last 24hrs VS reviewed since prior progress note.  Most recent are:  Visit Vitals  /70 (BP 1 Location: Right upper arm, BP Patient Position: Supine)   Pulse 74   Temp 98.6 °F (37 °C)   Resp 14   Ht 4' 11\" (1.499 m)   Wt 55.3 kg (121 lb 14.6 oz)   SpO2 99%   BMI 24.62 kg/m²       Intake/Output Summary (Last 24 hours) at 2/1/2023 1356  Last data filed at 1/31/2023 2130  Gross per 24 hour   Intake 350 ml   Output --   Net 350 ml     PHYSICAL EXAM:  General: WD, WN. Alert, cooperative, no acute distress    HEENT: NC, Atraumatic. Anicteric sclerae. Lungs:  CTA Bilaterally. No Wheezing/Rhonchi/Rales. Heart:  Regular  rhythm,  No murmur, No Rubs, No Gallops  Abdomen: Soft, Non distended, Non tender. +Bowel sounds, no HSM  Extremities: No c/c/e  Neurologic:  Alert and oriented X 3. No acute neurological distress   Psych:   Good insight. Not anxious nor agitated. Lab and Radiology Data Reviewed: (see below)    Medications Reviewed: (see below)  PMH/SH reviewed - no change compared to H&P  ________________________________________________________________________  Total time spent with patient: 20 minutes ________________________________________________________________________  Care Plan discussed with:  Patient y   Family     RN y              Consultant: Jin Salamanca NP     Procedures: see electronic medical records for all procedures/Xrays and details which were not copied into this note but were reviewed prior to creation of Plan. LABS:  Recent Labs     02/01/23  0613 01/31/23  1151   WBC 6.5 8.0   HGB 11.4* 12.8   HCT 33.2* 37.9    269     Recent Labs     02/01/23  0613 01/31/23  1151    141   K 2.8* 2.9*   * 110*   CO2 21 25   BUN 3* 7   CREA 0.85 1.03*   * 98   CA 8.2* 9.0   MG 1.7 1.8     Recent Labs     01/31/23  1151   *   TP 6.3*   ALB 2.8*   GLOB 3.5   LPSE 59*     No results for input(s): INR, PTP, APTT, INREXT in the last 72 hours. No results for input(s): FE, TIBC, PSAT, FERR in the last 72 hours. No results found for: FOL, RBCF  No results for input(s): PH, PCO2, PO2 in the last 72 hours.   No results for input(s): CPK, CKMB in the last 72 hours.    No lab exists for component: TROPONINI  Lab Results   Component Value Date/Time    Color DARK YELLOW 01/31/2023 11:51 AM    Appearance CLEAR 01/31/2023 11:51 AM    Specific gravity 1.022 01/31/2023 11:51 AM    Specific gravity 1.010 08/11/2021 11:02 AM    pH (UA) 5.5 01/31/2023 11:51 AM    Protein 30 (A) 01/31/2023 11:51 AM    Glucose Negative 01/31/2023 11:51 AM    Ketone 15 (A) 01/31/2023 11:51 AM    Bilirubin Negative 11/14/2022 01:40 PM    Urobilinogen 1.0 01/31/2023 11:51 AM    Nitrites Negative 01/31/2023 11:51 AM    Leukocyte Esterase SMALL (A) 01/31/2023 11:51 AM    Epithelial cells MODERATE (A) 01/31/2023 11:51 AM    Bacteria Negative 01/31/2023 11:51 AM    WBC 5-10 01/31/2023 11:51 AM    RBC 0-5 01/31/2023 11:51 AM       MEDICATIONS:  Current Facility-Administered Medications   Medication Dose Route Frequency    potassium chloride 10 mEq in 100 ml IVPB  10 mEq IntraVENous Q1H    pantoprazole (PROTONIX) 40 mg in 0.9% sodium chloride 10 mL injection  40 mg IntraVENous DAILY    levoFLOXacin (LEVAQUIN) 750 mg in D5W IVPB  750 mg IntraVENous Q24H    metroNIDAZOLE (FLAGYL) IVPB premix 500 mg  500 mg IntraVENous Q8H    levothyroxine (SYNTHROID) tablet 88 mcg  88 mcg Oral 6am    folic acid (FOLVITE) tablet 2 mg  2 mg Oral DAILY    cetirizine (ZYRTEC) tablet 10 mg  10 mg Oral DAILY    ondansetron (ZOFRAN ODT) tablet 4 mg  4 mg Oral Q8H PRN    L.acidophilus-paracasei-S.thermophil-bifidobacter (RISAQUAD) 8 billion cell capsule  1 Capsule Oral DAILY    dextrose 5% - 0.45% NaCl with KCl 20 mEq/L infusion  100 mL/hr IntraVENous CONTINUOUS    loperamide (IMODIUM) capsule 2 mg  2 mg Oral Q4H PRN    LORazepam (ATIVAN) tablet 0.5 mg  0.5 mg Oral QHS PRN     Current Outpatient Medications   Medication Sig    Cetirizine (ZyrTEC) 10 mg cap Take  by mouth.    pantoprazole (PROTONIX) 40 mg tablet TAKE 1 TABLET BY MOUTH DAILY    acetaminophen (TYLENOL) 500 mg tablet Take 500 mg by mouth every six (6) hours as needed for Pain.    folic acid (FOLVITE) 1 mg tablet TAKE 2 TABLETS BY MOUTH DAILY    levothyroxine (SYNTHROID) 88 mcg tablet TAKE 1 TABLET BY MOUTH DAILY    LORazepam (Ativan) 0.5 mg tablet Take 1 Tablet by mouth nightly as needed (sleep). Max Daily Amount: 0.5 mg.    dorzolamide-timoloL (COSOPT) 22.3-6.8 mg/mL ophthalmic solution Cosopt 22.3 mg-6.8 mg/mL eye drops   Prescribed by non 606/706 Marion Del Rosario MD    clobetasol (TEMOVATE) 0.05 % ointment two (2) times daily as needed. travoprost (TRAVATAN Z) 0.004 % ophthalmic solution Administer 1 Drop to both eyes nightly.

## 2023-02-01 NOTE — ED NOTES
Assumed care of pt at this time, pt resting in bed, denies pain at this time, pt refusing hospital gown.

## 2023-02-01 NOTE — PROGRESS NOTES
INTERNAL MEDICINE PROGRESS NOTE    NAME:  Nadine Arambula   :   1940   MRN:   [id number]     Date/Time:  2023 6:10 AM  Subjective:   History:  Chart reviewed and patient seen and examined and D/W her nurse this AM and all events noted. She is followed for GERD, IBS, DJD, anxiety with depression, hypothyroidism and other medical problems. She presented yesterday complaining of severe abdominal pain and diarrhea multiple times per day that had been present for about 8 days. She initially had nausea vomiting but that had resolved. She noted no fevers or chills. She was seen at an [facility] freestanding ER 2 days PTA at which time she had a CBC obtained revealing a white count of 13,100 and also had a CT of her abdomen consistent with an acute colitis. She was empirically placed on Cipro and Flagyl which she had been taking as prescribed. She also had been given 2 L of IV fluid while she was in the [facility] urgent care center secondary to dehydration. Since she was released from there she had been taking very little orally and persisted with frequent watery diarrhea. She had no further nausea but just did not have an appetite. She is found in the office to have marked tenderness in the lower abdomen increased in the left lower quadrant and felt prudent to admit her to the hospital for more aggressive treatment of her colitis with IV therapy. She continues with watery diarrhea and abdominal pain although maybe marginally improved. She has no cardiac or respiratory c/o. She has no  c/o. There are no neurologic c/o except weakness. There are no other c/o on complete ROS.     Medications reviewed:  Current Facility-Administered Medications   Medication Dose Route Frequency    metroNIDAZOLE (FLAGYL) IVPB premix 500 mg  500 mg IntraVENous Q8H    levothyroxine (SYNTHROID) tablet 88 mcg  88 mcg Oral 6am    folic acid (FOLVITE) tablet 2 mg  2 mg Oral DAILY    cetirizine (ZYRTEC) tablet 10 mg  10 mg Oral DAILY ondansetron (ZOFRAN ODT) tablet 4 mg  4 mg Oral Q8H PRN    L.acidophilus-paracasei-S.thermophil-bifidobacter (RISAQUAD) 8 billion cell capsule  1 Capsule Oral DAILY    dextrose 5% - 0.45% NaCl with KCl 20 mEq/L infusion  100 mL/hr IntraVENous CONTINUOUS    loperamide (IMODIUM) capsule 2 mg  2 mg Oral Q4H PRN    LORazepam (ATIVAN) tablet 0.5 mg  0.5 mg Oral QHS PRN    levoFLOXacin (LEVAQUIN) 750 mg in D5W IVPB  750 mg IntraVENous Q48H     Current Outpatient Medications   Medication Sig    Cetirizine (ZyrTEC) 10 mg cap Take  by mouth.    pantoprazole (PROTONIX) 40 mg tablet TAKE 1 TABLET BY MOUTH DAILY    acetaminophen (TYLENOL) 500 mg tablet Take 500 mg by mouth every six (6) hours as needed for Pain. folic acid (FOLVITE) 1 mg tablet TAKE 2 TABLETS BY MOUTH DAILY    levothyroxine (SYNTHROID) 88 mcg tablet TAKE 1 TABLET BY MOUTH DAILY    LORazepam (Ativan) 0.5 mg tablet Take 1 Tablet by mouth nightly as needed (sleep). Max Daily Amount: 0.5 mg.    dorzolamide-timoloL (COSOPT) 22.3-6.8 mg/mL ophthalmic solution Cosopt 22.3 mg-6.8 mg/mL eye drops   Prescribed by non 606/[id number] Marion Del Rosario MD    clobetasol (TEMOVATE) 0.05 % ointment two (2) times daily as needed. travoprost (TRAVATAN Z) 0.004 % ophthalmic solution Administer 1 Drop to both eyes nightly. Objective:   Vitals:  Visit Vitals  /62   Pulse 73   Temp 98.6 °F (37 °C)   Resp 12   Ht 4' 11\" (1.499 m)   Wt 121 lb 14.6 oz (55.3 kg)   SpO2 97%   BMI 24.62 kg/m²      O2 Device: None (Room air) Temp (24hrs), Av.2 °F (36.8 °C), Min:98 °F (36.7 °C), Max:98.6 °F (37 °C)      Last 24hr Input/Output:    Intake/Output Summary (Last 24 hours) at 2023 0610  Last data filed at 2023 2130  Gross per 24 hour   Intake 350 ml   Output --   Net 350 ml        PHYSICAL EXAM:  General:     Alert, cooperative, no distress, appears stated age. Head:    Normocephalic, without obvious abnormality, atraumatic. Eyes:    Conjunctivae/corneas clear.   PERRLA  Nose:   Nares normal. No drainage or sinus tenderness. Throat:     Lips, mucosa, and tongue normal.  No Thrush  Neck:   Supple, symmetrical,  no adenopathy, thyroid: non tender     no carotid bruit and no JVD. Back:     Symmetric,  No CVA tenderness. Lungs:    Clear to auscultation bilaterally. No Wheezing or Rhonchi. No rales. Heart:    Regular rate and rhythm,  no murmur, rub or gallop. Abdomen:    Soft, diffuse tenderness increased in LLQ with rebound. Mildly distended. Bowel sounds slightly hyperactive. No masses  Extremities:  Extremities normal, atraumatic, No cyanosis. No edema. No clubbing  Lymph nodes:  Cervical, supraclavicular normal.  Neurologic:  Normal strength, Alert and oriented X 3. Skin:                No rash      Lab Data Reviewed:    Recent Results (from the past 24 hour(s))   METABOLIC PANEL, COMPREHENSIVE    Collection Time: 01/31/23 11:51 AM   Result Value Ref Range    Sodium 141 136 - 145 mmol/L    Potassium 2.9 (L) 3.5 - 5.1 mmol/L    Chloride 110 (H) 97 - 108 mmol/L    CO2 25 21 - 32 mmol/L    Anion gap 6 5 - 15 mmol/L    Glucose 98 65 - 100 mg/dL    BUN 7 6 - 20 MG/DL    Creatinine 1.03 (H) 0.55 - 1.02 MG/DL    BUN/Creatinine ratio 7 (L) 12 - 20      eGFR 54 (L) >60 ml/min/1.73m2    Calcium 9.0 8.5 - 10.1 MG/DL    Bilirubin, total 0.4 0.2 - 1.0 MG/DL    ALT (SGPT) 24 12 - 78 U/L    AST (SGOT) 29 15 - 37 U/L    Alk.  phosphatase 148 (H) 45 - 117 U/L    Protein, total 6.3 (L) 6.4 - 8.2 g/dL    Albumin 2.8 (L) 3.5 - 5.0 g/dL    Globulin 3.5 2.0 - 4.0 g/dL    A-G Ratio 0.8 (L) 1.1 - 2.2     LIPASE    Collection Time: 01/31/23 11:51 AM   Result Value Ref Range    Lipase 59 (L) 73 - 393 U/L   URINALYSIS W/ REFLEX CULTURE    Collection Time: 01/31/23 11:51 AM    Specimen: Miscellaneous sample; Urine    Urine specimen   Result Value Ref Range    Color DARK YELLOW      Appearance CLEAR CLEAR      Specific gravity 1.022      pH (UA) 5.5 5.0 - 8.0      Protein 30 (A) NEG mg/dL    Glucose Negative NEG mg/dL    Ketone 15 (A) NEG mg/dL    Blood Negative NEG      Urobilinogen 1.0 0.2 - 1.0 EU/dL    Nitrites Negative NEG      Leukocyte Esterase SMALL (A) NEG      WBC 5-10 0 - 4 /hpf    RBC 0-5 0 - 5 /hpf    Epithelial cells MODERATE (A) FEW /lpf    Bacteria Negative NEG /hpf    UA:UC IF INDICATED CULTURE NOT INDICATED BY UA RESULT CNI     CBC WITH AUTOMATED DIFF    Collection Time: 01/31/23 11:51 AM   Result Value Ref Range    WBC 8.0 3.6 - 11.0 K/uL    RBC 4.22 3.80 - 5.20 M/uL    HGB 12.8 11.5 - 16.0 g/dL    HCT 37.9 35.0 - 47.0 %    MCV 89.8 80.0 - 99.0 FL    MCH 30.3 26.0 - 34.0 PG    MCHC 33.8 30.0 - 36.5 g/dL    RDW 15.1 (H) 11.5 - 14.5 %    PLATELET 809 548 - 885 K/uL    MPV 9.7 8.9 - 12.9 FL    NRBC 0.0 0  WBC    ABSOLUTE NRBC 0.00 0.00 - 0.01 K/uL    NEUTROPHILS 72 32 - 75 %    LYMPHOCYTES 15 12 - 49 %    MONOCYTES 6 5 - 13 %    EOSINOPHILS 4 0 - 7 %    BASOPHILS 1 0 - 1 %    IMMATURE GRANULOCYTES 2 (H) 0.0 - 0.5 %    ABS. NEUTROPHILS 5.8 1.8 - 8.0 K/UL    ABS. LYMPHOCYTES 1.2 0.8 - 3.5 K/UL    ABS. MONOCYTES 0.5 0.0 - 1.0 K/UL    ABS. EOSINOPHILS 0.3 0.0 - 0.4 K/UL    ABS. BASOPHILS 0.1 0.0 - 0.1 K/UL    ABS. IMM. GRANS. 0.2 (H) 0.00 - 0.04 K/UL    DF AUTOMATED     BILIRUBIN, CONFIRM    Collection Time: 01/31/23 11:51 AM   Result Value Ref Range    Bilirubin UA, confirm Negative NEG     MAGNESIUM    Collection Time: 01/31/23 11:51 AM   Result Value Ref Range    Magnesium 1.8 1.6 - 2.4 mg/dL         Assessment/Plan:     Principal Problem:    Acute colitis (1/31/2023)    Active Problems:    IBS (irritable bowel syndrome) (8/3/2017)      GERD (gastroesophageal reflux disease) (11/27/2017)      Primary osteoarthritis involving multiple joints (10/19/2022)      MIAN (generalized anxiety disorder) (8/3/2017)      Iron deficiency anemia (8/3/2017)       ___________________________________________________  PLAN:    1. Continue  IV Levaquin and Flagyl for colitis  2.   Repeat CT abdomen pelvis shows diffuse colitis  3. Continue IV hydration with D5-1/2 NS  4. NPO except sips and chips and meds  5. Imodium as needed  6. Continue probiotic  7. As needed Zofran  8. Replete potassium which is low on admission 2.9, now 2.8 so IV runs  9. Check magnesium level, normal 1.7  10. Continue Synthroid 0.88 daily  11   Continue Protonix but changed to IV  12. Continue Ativan 0.5 nightly as needed 13. Continue home eyedrops  14   Continue Zyrtec 10 mg daily  15. Continue folic acid 2 mg daily       50 Minutes spent today in direct care of this high complexity patient with greater than 50% in counseling and coordination of care.     If need to contact me use hospital  [phone], DO NOT USE PERFECT SERVE    ___________________________________________________    Attending Physician: Hilary Staples MD

## 2023-02-01 NOTE — ED NOTES
shift change report given to [facility] (oncoming nurse) by Charles Cochran (offgoing nurse). Report included the following information SBAR, ED Summary, Intake/Output, MAR and Recent Results. All questions answered, pt care turned over.

## 2023-02-02 LAB
ANION GAP SERPL CALC-SCNC: 6 MMOL/L (ref 5–15)
BUN SERPL-MCNC: 1 MG/DL (ref 6–20)
BUN/CREAT SERPL: 1 (ref 12–20)
CALCIUM SERPL-MCNC: 8.2 MG/DL (ref 8.5–10.1)
CHLORIDE SERPL-SCNC: 114 MMOL/L (ref 97–108)
CO2 SERPL-SCNC: 22 MMOL/L (ref 21–32)
CREAT SERPL-MCNC: 0.77 MG/DL (ref 0.55–1.02)
ERYTHROCYTE [DISTWIDTH] IN BLOOD BY AUTOMATED COUNT: 14.7 % (ref 11.5–14.5)
GLUCOSE SERPL-MCNC: 125 MG/DL (ref 65–100)
HCT VFR BLD AUTO: 34.8 % (ref 35–47)
HGB BLD-MCNC: 12 G/DL (ref 11.5–16)
MCH RBC QN AUTO: 29.9 PG (ref 26–34)
MCHC RBC AUTO-ENTMCNC: 34.5 G/DL (ref 30–36.5)
MCV RBC AUTO: 86.8 FL (ref 80–99)
NRBC # BLD: 0 K/UL (ref 0–0.01)
NRBC BLD-RTO: 0 PER 100 WBC
PLATELET # BLD AUTO: 223 K/UL (ref 150–400)
PMV BLD AUTO: 9 FL (ref 8.9–12.9)
POTASSIUM SERPL-SCNC: 3.2 MMOL/L (ref 3.5–5.1)
RBC # BLD AUTO: 4.01 M/UL (ref 3.8–5.2)
SODIUM SERPL-SCNC: 142 MMOL/L (ref 136–145)
WBC # BLD AUTO: 6.5 K/UL (ref 3.6–11)
WBC #/AREA STL HPF: NORMAL /HPF (ref 0–4)

## 2023-02-02 PROCEDURE — 74011000250 HC RX REV CODE- 250: Performed by: INTERNAL MEDICINE

## 2023-02-02 PROCEDURE — 36415 COLL VENOUS BLD VENIPUNCTURE: CPT

## 2023-02-02 PROCEDURE — 65270000029 HC RM PRIVATE

## 2023-02-02 PROCEDURE — C9113 INJ PANTOPRAZOLE SODIUM, VIA: HCPCS | Performed by: INTERNAL MEDICINE

## 2023-02-02 PROCEDURE — 85027 COMPLETE CBC AUTOMATED: CPT

## 2023-02-02 PROCEDURE — 74011250636 HC RX REV CODE- 250/636: Performed by: INTERNAL MEDICINE

## 2023-02-02 PROCEDURE — 80048 BASIC METABOLIC PNL TOTAL CA: CPT

## 2023-02-02 PROCEDURE — 99233 SBSQ HOSP IP/OBS HIGH 50: CPT | Performed by: INTERNAL MEDICINE

## 2023-02-02 PROCEDURE — 74011250637 HC RX REV CODE- 250/637: Performed by: INTERNAL MEDICINE

## 2023-02-02 RX ORDER — POTASSIUM CHLORIDE 7.45 MG/ML
10 INJECTION INTRAVENOUS
Status: COMPLETED | OUTPATIENT
Start: 2023-02-02 | End: 2023-02-02

## 2023-02-02 RX ADMIN — POTASSIUM CHLORIDE, DEXTROSE MONOHYDRATE AND SODIUM CHLORIDE 100 ML/HR: 150; 5; 450 INJECTION, SOLUTION INTRAVENOUS at 22:11

## 2023-02-02 RX ADMIN — LEVOTHYROXINE SODIUM 88 MCG: 0.09 TABLET ORAL at 06:36

## 2023-02-02 RX ADMIN — METRONIDAZOLE 500 MG: 500 INJECTION, SOLUTION INTRAVENOUS at 22:12

## 2023-02-02 RX ADMIN — SODIUM CHLORIDE 40 MG: 9 INJECTION, SOLUTION INTRAMUSCULAR; INTRAVENOUS; SUBCUTANEOUS at 09:13

## 2023-02-02 RX ADMIN — POTASSIUM CHLORIDE 10 MEQ: 10 INJECTION, SOLUTION INTRAVENOUS at 16:00

## 2023-02-02 RX ADMIN — Medication 1 CAPSULE: at 09:13

## 2023-02-02 RX ADMIN — METRONIDAZOLE 500 MG: 500 INJECTION, SOLUTION INTRAVENOUS at 06:36

## 2023-02-02 RX ADMIN — METRONIDAZOLE 500 MG: 500 INJECTION, SOLUTION INTRAVENOUS at 13:09

## 2023-02-02 RX ADMIN — POTASSIUM CHLORIDE 10 MEQ: 10 INJECTION, SOLUTION INTRAVENOUS at 13:00

## 2023-02-02 RX ADMIN — POTASSIUM CHLORIDE, DEXTROSE MONOHYDRATE AND SODIUM CHLORIDE 100 ML/HR: 150; 5; 450 INJECTION, SOLUTION INTRAVENOUS at 09:18

## 2023-02-02 RX ADMIN — FOLIC ACID 2 MG: 1 TABLET ORAL at 09:13

## 2023-02-02 RX ADMIN — POTASSIUM CHLORIDE 10 MEQ: 10 INJECTION, SOLUTION INTRAVENOUS at 15:00

## 2023-02-02 RX ADMIN — POTASSIUM CHLORIDE 10 MEQ: 10 INJECTION, SOLUTION INTRAVENOUS at 14:00

## 2023-02-02 RX ADMIN — CETIRIZINE HYDROCHLORIDE 10 MG: 10 TABLET, FILM COATED ORAL at 09:13

## 2023-02-02 RX ADMIN — LEVOFLOXACIN 750 MG: 5 INJECTION, SOLUTION INTRAVENOUS at 14:00

## 2023-02-02 NOTE — PROGRESS NOTES
GI PROGRESS NOTE   Mohsen Guajardo, TAURUS  [phone] NP in-hospital cell phone M-F until 4:30  After 5pm or on weekends, please call  for physician on call    Regis Elmore :1940 [id number]   ATTG: Dr. January Ricci  PCP: Melba Curtis MD  Date/Time:  2023 1:08 PM     Primary GI: Dr. Sheeba Raya    Reason for following: Diffuse colitis    Assessment:     Diffuse Colitis  Abnormal CT scan  -Stool studies negative (C. Dif and enteric panel)  - 1 week of profuse diarrhea  - Abdominal pain improved today  CT abd/pel W contrast 23 : Diffuse colitis. Diffuse colonic wall thickening suggest strongly a nonspecific colitis. CLN 2019 : normal colonic mucosa throughout  diverticulosis,  Mild in degree, involving the sigmoid  hemorrhoids internal, Moderate in size. Bx unremarkable. GERD  ANxiety  Hypothyroidism     Pt is a retired Nurse, worked at [employer]     Plan:     -Continue antibiotics  -Diet as tolerated  -Barrier cream as needed for bottom  -Supportive measures per primary team  -Will sign off for now as GI has nothing further to add to plan. Please call GI with any further questions or concerns. Thank you! *Plan discussed with Dr. Grace Box  Subjective:   Abdominal pain is much better. Still having some diarrhea. No hematochezia or melena. Tolerating diet. Objective:   VITALS:   Last 24hrs VS reviewed since prior progress note. Most recent are:  Visit Vitals  BP (!) 141/79   Pulse 76   Temp 97.7 °F (36.5 °C)   Resp 16   Ht 4' 11\" (1.499 m)   Wt 55.3 kg (121 lb 14.6 oz)   SpO2 100%   BMI 24.62 kg/m²       Intake/Output Summary (Last 24 hours) at 2023 1308  Last data filed at 2023 1442  Gross per 24 hour   Intake 100 ml   Output --   Net 100 ml     PHYSICAL EXAM:  General: WD, WN. Alert, cooperative, no acute distress    HEENT: NC, Atraumatic. Anicteric sclerae. Lungs:  CTA Bilaterally. No Wheezing/Rhonchi/Rales.   Heart:  Regular  rhythm,  No Rubs, No Gallops  Abdomen: Soft, Non distended, mildly tender. +Bowel sounds, no HSM  Extremities: No c/c/e  Neurologic:  Alert and oriented X 3. No acute neurological distress   Psych:   Good insight. Not anxious nor agitated. Lab and Radiology Data Reviewed: (see below)    Medications Reviewed: (see below)  PMH/SH reviewed - no change compared to H&P  ________________________________________________________________________  Total time spent with patient: 20minutes ________________________________________________________________________  Care Plan discussed with:  Patient Y   Family  Y   RN               Consultant:       Arnav Olson NP     Procedures: see electronic medical records for all procedures/Xrays and details which were not copied into this note but were reviewed prior to creation of Plan. LABS:  Recent Labs     02/02/23  0432 02/01/23  0613   WBC 6.5 6.5   HGB 12.0 11.4*   HCT 34.8* 33.2*    218     Recent Labs     02/02/23  0432 02/01/23  0613 01/31/23  1151    141 141   K 3.2* 2.8* 2.9*   * 112* 110*   CO2 22 21 25   BUN 1* 3* 7   CREA 0.77 0.85 1.03*   * 142* 98   CA 8.2* 8.2* 9.0   MG  --  1.7 1.8     Recent Labs     01/31/23  1151   *   TP 6.3*   ALB 2.8*   GLOB 3.5   LPSE 59*     No results for input(s): INR, PTP, APTT, INREXT in the last 72 hours. No results for input(s): FE, TIBC, PSAT, FERR in the last 72 hours. No results found for: FOL, RBCF  No results for input(s): PH, PCO2, PO2 in the last 72 hours. No results for input(s): CPK, CKMB in the last 72 hours.     No lab exists for component: TROPONINI  Lab Results   Component Value Date/Time    Color DARK YELLOW 01/31/2023 11:51 AM    Appearance CLEAR 01/31/2023 11:51 AM    Specific gravity 1.022 01/31/2023 11:51 AM    Specific gravity 1.010 08/11/2021 11:02 AM    pH (UA) 5.5 01/31/2023 11:51 AM    Protein 30 (A) 01/31/2023 11:51 AM    Glucose Negative 01/31/2023 11:51 AM    Ketone 15 (A) 01/31/2023 11:51 AM    Bilirubin Negative 11/14/2022 01:40 PM    Urobilinogen 1.0 01/31/2023 11:51 AM    Nitrites Negative 01/31/2023 11:51 AM    Leukocyte Esterase SMALL (A) 01/31/2023 11:51 AM    Epithelial cells MODERATE (A) 01/31/2023 11:51 AM    Bacteria Negative 01/31/2023 11:51 AM    WBC 5-10 01/31/2023 11:51 AM    RBC 0-5 01/31/2023 11:51 AM       MEDICATIONS:  Current Facility-Administered Medications   Medication Dose Route Frequency    potassium chloride 10 mEq in 100 ml IVPB  10 mEq IntraVENous Q1H    pantoprazole (PROTONIX) 40 mg in 0.9% sodium chloride 10 mL injection  40 mg IntraVENous DAILY    levoFLOXacin (LEVAQUIN) 750 mg in D5W IVPB  750 mg IntraVENous Q24H    zinc oxide-cod liver oil (DESITIN) 40 % paste   Topical PRN    metroNIDAZOLE (FLAGYL) IVPB premix 500 mg  500 mg IntraVENous Q8H    levothyroxine (SYNTHROID) tablet 88 mcg  88 mcg Oral 6am    folic acid (FOLVITE) tablet 2 mg  2 mg Oral DAILY    cetirizine (ZYRTEC) tablet 10 mg  10 mg Oral DAILY    ondansetron (ZOFRAN ODT) tablet 4 mg  4 mg Oral Q8H PRN    L.acidophilus-paracasei-S.thermophil-bifidobacter (RISAQUAD) 8 billion cell capsule  1 Capsule Oral DAILY    dextrose 5% - 0.45% NaCl with KCl 20 mEq/L infusion  100 mL/hr IntraVENous CONTINUOUS    loperamide (IMODIUM) capsule 2 mg  2 mg Oral Q4H PRN    LORazepam (ATIVAN) tablet 0.5 mg  0.5 mg Oral QHS PRN

## 2023-02-02 NOTE — PROGRESS NOTES
Problem: Risk for Spread of Infection  Goal: Prevent transmission of infectious organism to others  Description: Prevent the transmission of infectious organisms to other patients, staff members, and visitors. Outcome: Progressing Towards Goal     Problem: Patient Education:  Go to Education Activity  Goal: Patient/Family Education  Outcome: Progressing Towards Goal     Problem: Falls - Risk of  Goal: *Absence of Falls  Description: Document Lamont Iglesias Fall Risk and appropriate interventions in the flowsheet.   Outcome: Progressing Towards Goal  Note: Fall Risk Interventions:                                Problem: Patient Education: Go to Patient Education Activity  Goal: Patient/Family Education  Outcome: Progressing Towards Goal

## 2023-02-02 NOTE — PROGRESS NOTES
INTERNAL MEDICINE PROGRESS NOTE    NAME:  Nadine Steinberg   :   1940   MRN:   [id number]     Date/Time:  2023 6:05 AM  Subjective:   History:  Chart reviewed and patient seen and examined and D/W her nurse this AM and all events noted. She is followed for GERD, IBS, DJD, anxiety with depression, hypothyroidism and other medical problems. She presented yesterday complaining of severe abdominal pain and diarrhea multiple times per day that had been present for about 8 days. She initially had nausea vomiting but that had resolved. She noted no fevers or chills. She was seen at an [facility] freestanding ER 2 days PTA at which time she had a CBC obtained revealing a white count of 13,100 and also had a CT of her abdomen consistent with an acute colitis. She was empirically placed on Cipro and Flagyl which she had been taking as prescribed. She also had been given 2 L of IV fluid while she was in the [facility] urgent care center secondary to dehydration. Since she was released from there she had been taking very little orally and persisted with frequent watery diarrhea. She had no further nausea but just did not have an appetite. She is found in the office to have marked tenderness in the lower abdomen increased in the left lower quadrant and felt prudent to admit her to the hospital for more aggressive treatment of her colitis with IV therapy. She continues with watery diarrhea which occurred yesterday about 3 times although not during the night last night and abdominal pain although it may be marginally improved. She has no cardiac or respiratory c/o. She has no  c/o. There are no neurologic c/o except weakness. There are no other c/o on complete ROS.     Medications reviewed:  Current Facility-Administered Medications   Medication Dose Route Frequency    pantoprazole (PROTONIX) 40 mg in 0.9% sodium chloride 10 mL injection  40 mg IntraVENous DAILY    levoFLOXacin (LEVAQUIN) 750 mg in D5W IVPB  750 mg IntraVENous Q24H    zinc oxide-cod liver oil (DESITIN) 40 % paste   Topical PRN    metroNIDAZOLE (FLAGYL) IVPB premix 500 mg  500 mg IntraVENous Q8H    levothyroxine (SYNTHROID) tablet 88 mcg  88 mcg Oral 6am    folic acid (FOLVITE) tablet 2 mg  2 mg Oral DAILY    cetirizine (ZYRTEC) tablet 10 mg  10 mg Oral DAILY    ondansetron (ZOFRAN ODT) tablet 4 mg  4 mg Oral Q8H PRN    L.acidophilus-paracasei-S.thermophil-bifidobacter (RISAQUAD) 8 billion cell capsule  1 Capsule Oral DAILY    dextrose 5% - 0.45% NaCl with KCl 20 mEq/L infusion  100 mL/hr IntraVENous CONTINUOUS    loperamide (IMODIUM) capsule 2 mg  2 mg Oral Q4H PRN    LORazepam (ATIVAN) tablet 0.5 mg  0.5 mg Oral QHS PRN        Objective:   Vitals:  Visit Vitals  /71 (BP 1 Location: Left upper arm, BP Patient Position: Supine)   Pulse 72   Temp 99 °F (37.2 °C)   Resp 18   Ht 4' 11\" (1.499 m)   Wt 121 lb 14.6 oz (55.3 kg)   SpO2 97%   BMI 24.62 kg/m²      O2 Device: None Temp (24hrs), Av.5 °F (36.9 °C), Min:97.9 °F (36.6 °C), Max:99 °F (37.2 °C)      Last 24hr Input/Output:    Intake/Output Summary (Last 24 hours) at 2023 8565  Last data filed at 2023 1442  Gross per 24 hour   Intake 100 ml   Output --   Net 100 ml          PHYSICAL EXAM:  General:     Alert, cooperative, no distress, appears stated age. Head:    Normocephalic, without obvious abnormality, atraumatic. Eyes:    Conjunctivae/corneas clear. PERRLA  Nose:   Nares normal. No drainage or sinus tenderness. Throat:     Lips, mucosa, and tongue normal.  No Thrush  Neck:   Supple, symmetrical,  no adenopathy, thyroid: non tender     no carotid bruit and no JVD. Back:     Symmetric,  No CVA tenderness. Lungs:    Clear to auscultation bilaterally. No Wheezing or Rhonchi. No rales. Heart:    Regular rate and rhythm,  no murmur, rub or gallop. Abdomen:    Soft, diffuse tenderness increased in LLQ with rebound. Mildly distended. Bowel sounds slightly hyperactive.  No masses  Extremities:  Extremities normal, atraumatic, No cyanosis. No edema. No clubbing  Lymph nodes:  Cervical, supraclavicular normal.  Neurologic:  Normal strength, Alert and oriented X 3.    Skin:                No rash      Lab Data Reviewed:    Recent Results (from the past 24 hour(s))   CBC W/O DIFF    Collection Time: 02/01/23  6:13 AM   Result Value Ref Range    WBC 6.5 3.6 - 11.0 K/uL    RBC 3.72 (L) 3.80 - 5.20 M/uL    HGB 11.4 (L) 11.5 - 16.0 g/dL    HCT 33.2 (L) 35.0 - 47.0 %    MCV 89.2 80.0 - 99.0 FL    MCH 30.6 26.0 - 34.0 PG    MCHC 34.3 30.0 - 36.5 g/dL    RDW 15.0 (H) 11.5 - 14.5 %    PLATELET 198 774 - 624 K/uL    MPV 9.6 8.9 - 12.9 FL    NRBC 0.0 0  WBC    ABSOLUTE NRBC 0.00 0.00 - 6.63 K/uL   METABOLIC PANEL, BASIC    Collection Time: 02/01/23  6:13 AM   Result Value Ref Range    Sodium 141 136 - 145 mmol/L    Potassium 2.8 (L) 3.5 - 5.1 mmol/L    Chloride 112 (H) 97 - 108 mmol/L    CO2 21 21 - 32 mmol/L    Anion gap 8 5 - 15 mmol/L    Glucose 142 (H) 65 - 100 mg/dL    BUN 3 (L) 6 - 20 MG/DL    Creatinine 0.85 0.55 - 1.02 MG/DL    BUN/Creatinine ratio 4 (L) 12 - 20      eGFR >60 >60 ml/min/1.73m2    Calcium 8.2 (L) 8.5 - 10.1 MG/DL   MAGNESIUM    Collection Time: 02/01/23  6:13 AM   Result Value Ref Range    Magnesium 1.7 1.6 - 2.4 mg/dL   ENTERIC BACTERIA PANEL, DNA    Collection Time: 02/01/23 10:51 AM   Result Value Ref Range    Shigella species, DNA Negative NEG      Campylobacter species, DNA Negative NEG      Vibrio species, DNA Negative NEG      Enterotoxigen E Coli, DNA Negative NEG      Shiga toxin producing, DNA Negative NEG      Salmonella species, DNA Negative NEG      P. shigelloides, DNA Negative NEG      Y. enterocolitica, DNA Negative NEG     C. DIFFICILE AG & TOXIN A/B    Collection Time: 02/01/23 10:51 AM   Result Value Ref Range    GDH ANTIGEN Negative NEG      C. difficile toxin Negative NEG      INTERPRETATION NEGATIVE FOR TOXIGENIC C. DIFFICILE NTXCD     CBC W/O DIFF    Collection Time: 02/02/23  4:32 AM   Result Value Ref Range    WBC 6.5 3.6 - 11.0 K/uL    RBC 4.01 3.80 - 5.20 M/uL    HGB 12.0 11.5 - 16.0 g/dL    HCT 34.8 (L) 35.0 - 47.0 %    MCV 86.8 80.0 - 99.0 FL    MCH 29.9 26.0 - 34.0 PG    MCHC 34.5 30.0 - 36.5 g/dL    RDW 14.7 (H) 11.5 - 14.5 %    PLATELET 071 081 - 179 K/uL    MPV 9.0 8.9 - 12.9 FL    NRBC 0.0 0  WBC    ABSOLUTE NRBC 0.00 0.00 - 0.01 K/uL         Assessment/Plan:     Principal Problem:    Acute colitis (1/31/2023)    Active Problems:    IBS (irritable bowel syndrome) (8/3/2017)      GERD (gastroesophageal reflux disease) (11/27/2017)      Primary osteoarthritis involving multiple joints (10/19/2022)      MIAN (generalized anxiety disorder) (8/3/2017)      Iron deficiency anemia (8/3/2017)     ___________________________________________________  PLAN:    1. Continue  IV Levaquin and Flagyl for colitis  2. Repeat CT abdomen pelvis shows diffuse colitis  3. Continue IV hydration with D5-1/2 NS  4. She tolerated some clear liquids yesterday  5. Imodium as needed  6. Continue probiotic  7. As needed Zofran  8. Replete potassium which is low on admission 2.9, now 3.2 after yesterday when IV runs were given. Repeat IV runs now  9. Check magnesium level, normal 1.7  10. Continue Synthroid 0.88 daily  11   Continue Protonix but changed to IV  12. Continue Ativan 0.5 nightly as needed 13. Continue home eyedrops  14   Continue Zyrtec 10 mg daily  15. Continue folic acid 2 mg daily       50 Minutes spent today in direct care of this high complexity patient with greater than 50% in counseling and coordination of care.     If need to contact me use hospital  [phone], DO NOT USE PERFECT SERVE    ___________________________________________________    Attending Physician: Juliana Paz MD

## 2023-02-02 NOTE — PROGRESS NOTES
End of Shift Note    Bedside shift change report given to Izabel Christianson RN (oncoming nurse) by aFwn Sheets RN (offgoing nurse).   Report included the following information SBAR and Kardex    Shift worked:  [id number]     Shift summary and any significant changes:    -runs of K+ given  -minimal complaints   Concerns for physician to address: None     Zone phone for oncoming shift:

## 2023-02-02 NOTE — PROGRESS NOTES
Transition of Care Plan:    RUR:  9 % low   Disposition: home   If SNF or IPR: Date FOC offered: n/a   Date FOC received:  Date authorization started with reference number:  Date authorization received and expires:  Accepting facility: n/a   Follow up appointments: to be made   DME needed: to be determined   Transportation at Discharge: family   [place] or means to access home:    yes    IM Medicare Letter: 2 nd letter to be given  Is patient a  and connected with the [place]?         no       If yes, was [employer] transfer form completed and [facility] notified? Caregiver Contact:  Discharge Caregiver contacted prior to discharge? Care Conference needed?:           Reason for Admission:  severe abdominal pain,  diarrhea                      RUR Score:    low 9%                 Plan for utilizing home health:    n/a        PCP: First and Last name:  Kimberly Brandt MD     Name of Practice:    Are you a current patient: Yes/No:  yrd    Approximate date of last visit:  last Monday    Can you participate in a virtual visit with your PCP:  not sure                     Current Advanced Directive/Advance Care Plan: Prior      Healthcare Decision Maker:   Click here to complete [address] including selection of the Healthcare Decision Maker Relationship (ie \"Primary\")           Declines info at this time                   Transition of Care Plan:     home                         I met with patient in room-  daughter in the room. Patient is a retired nurse. Lives with her spouse and is normally very independent. She cooks,  she cleans and does laundry at home. She showers alone. She is a little wobbly now but that is not her baseline. She uses [employer]. She has Medicare and AARP. She states she has prescription coverage. She has never needed home care. She is not a . Care manager to follow as needed. She saw her PCP Monday.        Keith Long RN  348 PM   [phone]

## 2023-02-02 NOTE — PROGRESS NOTES
1071  Change of shift report received at this time. 1909    ADMISSION SBAR NOTE    IP UNIT CALLED NOTE IS READY: Yes Spoke to Prince Silva  IF there are questions Call Jessica Wagner at phone # [phone]    SITUATION/BACKGROUND:    Patient is being transferred to [facility], Room# [phone]    Patient's Chief Complaint was diarrhea for 8 days and is admitted for LL. CODE STATUS: Prior    ISOLATION/PRECAUTIONS: Yes   ISOLATION TYPE: Enteric isolation until CDIFF test comes back    Called outstanding consults: Yes     Are there still sign and held orders that need to be released? No     STAT labs collected: Yes  REPEAT LACTIC ACID DUE? No  TIME DUE: not applicable      All STAT orders are complete: Yes    The following personal items will be sent with the patient during transfer to the floor: All valuables:   ITEM: Dental Appliances: None  ITEM: Visual Aid: With patient, Magnifying glass  ITEM: Hearing Aid: At bedside  ITEM: Jewelry: Ring, With patient, Watch  ITEM: Clothing: At bedside       ASSESSMENT:    CIWA Assessment: No  Last Score: not applicable      NEURO:     NIH SCORE:    AGATHA SCREENING:      NEURO ASSESSMENT:      Is patient impulsive? No   Is patient oriented? Yes   Do they follow commands? Yes  Is the patient ambulatory? Yes Device need none      FALL RISK? Yes up x 1 assist due to IV lines  INTERVENTIONS: up with 1 assist due to IV lines      RESPIRATORY: Is patient on Oxygen? No    OXYGEN: Oxygen Therapy  O2 Device: None (02/01/23 0709)    CARDIAC: Is cardiac monitoring ordered? No  Last Rhythm: sinus rhythm  Patient to transfer with tele box on? No   Is patient using a LIFE VEST?  No     LINE ACCESS:   Peripheral IV 01/31/23 Left Antecubital (Active)       Peripheral IV 01/31/23 Right (Active)   Site Assessment Clean, dry, & intact 01/31/23 1402   Phlebitis Assessment 0 01/31/23 1402   Infiltration Assessment 0 01/31/23 1402   Dressing Status Clean, dry, & intact 01/31/23 1402   Dressing Type 4 X 4 23 140   Hub Color/Line Status Pink 23        /GI: CONTINENT BOWEL/BLADDER? Yes  URINARY OUTPUT: voiding  CHRONIC OR ACUTE? Not applicable   If CHRONIC, is it 1days old, was it changed prior to specimen collection? Not applicable  WAS UA WITH REFLEX SENT TO LAB? Not applicable  IF NO, COLLECT AND SEND PRIOR TO TRANSPORT TO INPATIENT AREA    INTEGUMENTARY:   IS THE PATIENT UNDRESSED? Yes  ARE THERE WOUNDS PRESENT? No   ARE THE WOUNDS DOCUMENTED? Not applicable    RESTRAINTS IN USE: No      IS DOCUMENTATION COMPLETE:  not applicable  Is there a current Order? No  When does it ?  Not applicable      Vital Signs  Level of Consciousness: Alert (0) (23 1100)  Temp: 98.6 °F (37 °C) (23)  Temp Source: Oral (23)  Pulse (Heart Rate): 76 (23 1513)  Heart Rate Source: Monitor (23 1513)  Cardiac Rhythm: Sinus Rhythm (23 1206)  Resp Rate: 14 (23 1513)  BP: 125/68 (23 1513)  MAP (Monitor): 75 (23 0900)  MAP (Calculated): 87 (23 1513)  BP 1 Location: Right upper arm (23)  BP 1 Method: Automatic (23)  BP Patient Position: At rest (23)  MEWS Score: 0 (23)  Pain 1  Pain Scale 1: Numeric (0 - 10) (23 114)  Pain Intensity 1: 8 (23 114)  Patient Stated Pain Goal: 0 (23 114)  Pain Location 1: Abdomen (23)  Pain Orientation 1: Lower (23)      REVIEW:

## 2023-02-02 NOTE — PROGRESS NOTES
CM attempted to reach patient on room phone and personal numbers. Also called patient's . No answer. CM will continue to follow to complete initial assessment.

## 2023-02-03 LAB
ANION GAP SERPL CALC-SCNC: 5 MMOL/L (ref 5–15)
BUN SERPL-MCNC: <1 MG/DL (ref 6–20)
BUN/CREAT SERPL: ABNORMAL (ref 12–20)
CALCIUM SERPL-MCNC: 8.5 MG/DL (ref 8.5–10.1)
CHLORIDE SERPL-SCNC: 114 MMOL/L (ref 97–108)
CO2 SERPL-SCNC: 23 MMOL/L (ref 21–32)
CREAT SERPL-MCNC: 0.79 MG/DL (ref 0.55–1.02)
ERYTHROCYTE [DISTWIDTH] IN BLOOD BY AUTOMATED COUNT: 15 % (ref 11.5–14.5)
ERYTHROCYTE [DISTWIDTH] IN BLOOD BY AUTOMATED COUNT: 15.2 % (ref 11.5–14.5)
GLUCOSE SERPL-MCNC: 121 MG/DL (ref 65–100)
HCT VFR BLD AUTO: 36.7 % (ref 35–47)
HCT VFR BLD AUTO: 37.1 % (ref 35–47)
HGB BLD-MCNC: 12.4 G/DL (ref 11.5–16)
HGB BLD-MCNC: 12.8 G/DL (ref 11.5–16)
MCH RBC QN AUTO: 30.1 PG (ref 26–34)
MCH RBC QN AUTO: 30.5 PG (ref 26–34)
MCHC RBC AUTO-ENTMCNC: 33.8 G/DL (ref 30–36.5)
MCHC RBC AUTO-ENTMCNC: 34.5 G/DL (ref 30–36.5)
MCV RBC AUTO: 88.5 FL (ref 80–99)
MCV RBC AUTO: 89.1 FL (ref 80–99)
NRBC # BLD: 0 K/UL (ref 0–0.01)
NRBC # BLD: 0 K/UL (ref 0–0.01)
NRBC BLD-RTO: 0 PER 100 WBC
NRBC BLD-RTO: 0 PER 100 WBC
PLATELET # BLD AUTO: 245 K/UL (ref 150–400)
PLATELET # BLD AUTO: 250 K/UL (ref 150–400)
PMV BLD AUTO: 9 FL (ref 8.9–12.9)
PMV BLD AUTO: 9.2 FL (ref 8.9–12.9)
POTASSIUM SERPL-SCNC: 3.7 MMOL/L (ref 3.5–5.1)
RBC # BLD AUTO: 4.12 M/UL (ref 3.8–5.2)
RBC # BLD AUTO: 4.19 M/UL (ref 3.8–5.2)
SODIUM SERPL-SCNC: 142 MMOL/L (ref 136–145)
WBC # BLD AUTO: 5.8 K/UL (ref 3.6–11)
WBC # BLD AUTO: 6.4 K/UL (ref 3.6–11)

## 2023-02-03 PROCEDURE — 85027 COMPLETE CBC AUTOMATED: CPT

## 2023-02-03 PROCEDURE — 74011000250 HC RX REV CODE- 250: Performed by: INTERNAL MEDICINE

## 2023-02-03 PROCEDURE — 80048 BASIC METABOLIC PNL TOTAL CA: CPT

## 2023-02-03 PROCEDURE — 65270000029 HC RM PRIVATE

## 2023-02-03 PROCEDURE — 74011250636 HC RX REV CODE- 250/636: Performed by: INTERNAL MEDICINE

## 2023-02-03 PROCEDURE — 99233 SBSQ HOSP IP/OBS HIGH 50: CPT | Performed by: INTERNAL MEDICINE

## 2023-02-03 PROCEDURE — 74011250637 HC RX REV CODE- 250/637: Performed by: INTERNAL MEDICINE

## 2023-02-03 PROCEDURE — 36415 COLL VENOUS BLD VENIPUNCTURE: CPT

## 2023-02-03 PROCEDURE — C9113 INJ PANTOPRAZOLE SODIUM, VIA: HCPCS | Performed by: INTERNAL MEDICINE

## 2023-02-03 RX ADMIN — CETIRIZINE HYDROCHLORIDE 10 MG: 10 TABLET, FILM COATED ORAL at 09:23

## 2023-02-03 RX ADMIN — FOLIC ACID 2 MG: 1 TABLET ORAL at 09:23

## 2023-02-03 RX ADMIN — METRONIDAZOLE 500 MG: 500 INJECTION, SOLUTION INTRAVENOUS at 13:09

## 2023-02-03 RX ADMIN — SODIUM CHLORIDE 40 MG: 9 INJECTION, SOLUTION INTRAMUSCULAR; INTRAVENOUS; SUBCUTANEOUS at 09:23

## 2023-02-03 RX ADMIN — ONDANSETRON 4 MG: 4 TABLET, ORALLY DISINTEGRATING ORAL at 20:34

## 2023-02-03 RX ADMIN — LEVOFLOXACIN 750 MG: 5 INJECTION, SOLUTION INTRAVENOUS at 14:00

## 2023-02-03 RX ADMIN — POTASSIUM CHLORIDE, DEXTROSE MONOHYDRATE AND SODIUM CHLORIDE 75 ML/HR: 150; 5; 450 INJECTION, SOLUTION INTRAVENOUS at 09:22

## 2023-02-03 RX ADMIN — METRONIDAZOLE 500 MG: 500 INJECTION, SOLUTION INTRAVENOUS at 07:42

## 2023-02-03 RX ADMIN — Medication 1 CAPSULE: at 09:23

## 2023-02-03 RX ADMIN — LEVOTHYROXINE SODIUM 88 MCG: 0.09 TABLET ORAL at 07:43

## 2023-02-03 NOTE — PROGRESS NOTES
End of Shift Note    Bedside shift change report given to Joey Sawyer (oncoming nurse) by Shawn Burgess RN (offgoing nurse).   Report included the following information SBAR and Kardex    Shift worked:  [id number]     Shift summary and any significant changes:    -IV infiltrated, 2nd IV used  -minimal complaints   Concerns for physician to address: None     Zone phone for oncoming shift:

## 2023-02-03 NOTE — PROGRESS NOTES
Problem: Risk for Spread of Infection  Goal: Prevent transmission of infectious organism to others  Description: Prevent the transmission of infectious organisms to other patients, staff members, and visitors. Outcome: Progressing Towards Goal     Problem: Patient Education:  Go to Education Activity  Goal: Patient/Family Education  Outcome: Progressing Towards Goal     Problem: Falls - Risk of  Goal: *Absence of Falls  Description: Document Kendrick Hooper Fall Risk and appropriate interventions in the flowsheet.   Outcome: Progressing Towards Goal  Note: Fall Risk Interventions:                                Problem: Patient Education: Go to Patient Education Activity  Goal: Patient/Family Education  Outcome: Progressing Towards Goal

## 2023-02-03 NOTE — PROGRESS NOTES
Problem: Risk for Spread of Infection  Goal: Prevent transmission of infectious organism to others  Description: Prevent the transmission of infectious organisms to other patients, staff members, and visitors. 2/3/2023 0344 by Sonja Kenny RN  Outcome: Progressing Towards Goal  2/3/2023 0343 by Sonja Kenny RN  Outcome: Progressing Towards Goal     Problem: Patient Education:  Go to Education Activity  Goal: Patient/Family Education  2/3/2023 0344 by Sonja Kenny RN  Outcome: Progressing Towards Goal  2/3/2023 0343 by Sonja Kenny RN  Outcome: Progressing Towards Goal     Problem: Falls - Risk of  Goal: *Absence of Falls  Description: Document Lana All Fall Risk and appropriate interventions in the flowsheet. 2/3/2023 0344 by Sonja Kenny RN  Outcome: Progressing Towards Goal  Note: Fall Risk Interventions:                             2/3/2023 0343 by Sonja Kenny RN  Outcome: Progressing Towards Goal  Note: Fall Risk Interventions:                                Problem: Patient Education: Go to Patient Education Activity  Goal: Patient/Family Education  2/3/2023 0344 by Sonja Kenny RN  Outcome: Progressing Towards Goal  2/3/2023 0343 by Sonja Kenny RN  Outcome: Progressing Towards Goal     Problem: Risk for Spread of Infection  Goal: Prevent transmission of infectious organism to others  Description: Prevent the transmission of infectious organisms to other patients, staff members, and visitors.   2/3/2023 0344 by Sonja Kenny RN  Outcome: Progressing Towards Goal  2/3/2023 0343 by Sonja Kenny RN  Outcome: Progressing Towards Goal     Problem: Patient Education:  Go to Education Activity  Goal: Patient/Family Education  2/3/2023 0344 by Sonja Kenny RN  Outcome: Progressing Towards Goal  2/3/2023 0343 by Sonja Kenny RN  Outcome: Progressing Towards Goal     Problem: Falls - Risk of  Goal: *Absence of Falls  Description: Document Adele Fall Risk and appropriate interventions in the flowsheet.   2/3/2023 0344 by General Jesus RN  Outcome: Progressing Towards Goal  Note: Fall Risk Interventions:                             2/3/2023 0343 by General Jesus RN  Outcome: Progressing Towards Goal  Note: Fall Risk Interventions:                                Problem: Patient Education: Go to Patient Education Activity  Goal: Patient/Family Education  2/3/2023 0344 by General Jesus RN  Outcome: Progressing Towards Goal  2/3/2023 0343 by General Jesus RN  Outcome: Progressing Towards Goal

## 2023-02-03 NOTE — PROGRESS NOTES
INTERNAL MEDICINE PROGRESS NOTE    NAME:  Nadine Urrutia   :   1940   MRN:   [id number]     Date/Time:  2/3/2023 6:04 AM  Subjective:   History:  Chart reviewed and patient seen and examined and D/W her nurse this AM and all events noted. She is followed for GERD, IBS, DJD, anxiety with depression, hypothyroidism and other medical problems. She presented yesterday complaining of severe abdominal pain and diarrhea multiple times per day that had been present for about 8 days. She initially had nausea vomiting but that had resolved. She noted no fevers or chills. [facility] at which time she had a CBC obtained revealing a white count of 13,100 and also had a CT of her abdomen consistent with an acute colitis. She was empirically placed on Cipro and Flagyl which she had been taking as prescribed. She also had been given 2 L of IV fluid while she was in the [facility] urgent care center secondary to dehydration. Since she was released from there she had been taking very little orally and persisted with frequent watery diarrhea. She had no further nausea but just did not have an appetite. She is found in the office to have marked tenderness in the lower abdomen increased in the left lower quadrant and felt prudent to admit her to the hospital for more aggressive treatment of her colitis with IV therapy. She continues with some watery diarrhea which has improved although not during the night last night and abdominal pain although it may be marginally improved is still present and she feels bloated. She has no cardiac or respiratory c/o. She has no  c/o. There are no neurologic c/o except weakness. There are no other c/o on complete ROS. She did tolerate increase to full liquids yesterday but did not take it a whole lot orally.     Medications reviewed:  Current Facility-Administered Medications   Medication Dose Route Frequency    pantoprazole (PROTONIX) 40 mg in 0.9% sodium chloride 10 mL injection  40 mg IntraVENous DAILY    levoFLOXacin (LEVAQUIN) 750 mg in D5W IVPB  750 mg IntraVENous Q24H    zinc oxide-cod liver oil (DESITIN) 40 % paste   Topical PRN    metroNIDAZOLE (FLAGYL) IVPB premix 500 mg  500 mg IntraVENous Q8H    levothyroxine (SYNTHROID) tablet 88 mcg  88 mcg Oral 6am    folic acid (FOLVITE) tablet 2 mg  2 mg Oral DAILY    cetirizine (ZYRTEC) tablet 10 mg  10 mg Oral DAILY    ondansetron (ZOFRAN ODT) tablet 4 mg  4 mg Oral Q8H PRN    L.acidophilus-paracasei-S.thermophil-bifidobacter (RISAQUAD) 8 billion cell capsule  1 Capsule Oral DAILY    dextrose 5% - 0.45% NaCl with KCl 20 mEq/L infusion  100 mL/hr IntraVENous CONTINUOUS    loperamide (IMODIUM) capsule 2 mg  2 mg Oral Q4H PRN    LORazepam (ATIVAN) tablet 0.5 mg  0.5 mg Oral QHS PRN        Objective:   Vitals:  Visit Vitals  /77 (BP 1 Location: Left upper arm, BP Patient Position: Sitting)   Pulse 79   Temp 97.9 °F (36.6 °C)   Resp 16   Ht 4' 11\" (1.499 m)   Wt 121 lb 14.6 oz (55.3 kg)   SpO2 97%   BMI 24.62 kg/m²      O2 Device: None (Room air) Temp (24hrs), Av.2 °F (36.8 °C), Min:97.9 °F (36.6 °C), Max:98.4 °F (36.9 °C)      Last 24hr Input/Output:    Intake/Output Summary (Last 24 hours) at 2/3/2023 0809  Last data filed at 2023 2212  Gross per 24 hour   Intake 6218.33 ml   Output 500 ml   Net 5718.33 ml        PHYSICAL EXAM:  General:     Alert, cooperative, no distress, appears stated age. Head:    Normocephalic, without obvious abnormality, atraumatic. Eyes:    Conjunctivae/corneas clear. PERRLA  Nose:   Nares normal. No drainage or sinus tenderness. Throat:     Lips, mucosa, and tongue normal.  No Thrush  Neck:   Supple, symmetrical,  no adenopathy, thyroid: non tender     no carotid bruit and no JVD. Back:     Symmetric,  No CVA tenderness. Lungs:    Clear to auscultation bilaterally. No Wheezing or Rhonchi. No rales.   Heart:    Regular rate and rhythm,  no murmur, rub or gallop. Abdomen:    Soft, diffuse tenderness increased in LLQ with rebound. Moderately distended. Bowel sounds slightly hyperactive. No masses  Extremities:  Extremities normal, atraumatic, No cyanosis. No edema. No clubbing  Lymph nodes:  Cervical, supraclavicular normal.  Neurologic:  Normal strength, Alert and oriented X 3. Skin:                No rash      Lab Data Reviewed:    Recent Results (from the past 24 hour(s))   METABOLIC PANEL, BASIC    Collection Time: 02/03/23  5:00 AM   Result Value Ref Range    Sodium 142 136 - 145 mmol/L    Potassium 3.7 3.5 - 5.1 mmol/L    Chloride 114 (H) 97 - 108 mmol/L    CO2 23 21 - 32 mmol/L    Anion gap 5 5 - 15 mmol/L    Glucose 121 (H) 65 - 100 mg/dL    BUN <1 (L) 6 - 20 MG/DL    Creatinine 0.79 0.55 - 1.02 MG/DL    BUN/Creatinine ratio Cannot be calculated 12 - 20      eGFR >60 >60 ml/min/1.73m2    Calcium 8.5 8.5 - 10.1 MG/DL   CBC W/O DIFF    Collection Time: 02/03/23  5:00 AM   Result Value Ref Range    WBC 5.8 3.6 - 11.0 K/uL    RBC 4.19 3.80 - 5.20 M/uL    HGB 12.8 11.5 - 16.0 g/dL    HCT 37.1 35.0 - 47.0 %    MCV 88.5 80.0 - 99.0 FL    MCH 30.5 26.0 - 34.0 PG    MCHC 34.5 30.0 - 36.5 g/dL    RDW 15.2 (H) 11.5 - 14.5 %    PLATELET 921 603 - 795 K/uL    MPV 9.2 8.9 - 12.9 FL    NRBC 0.0 0  WBC    ABSOLUTE NRBC 0.00 0.00 - 0.01 K/uL         Assessment/Plan:     Principal Problem:    Acute colitis (1/31/2023)    Active Problems:    IBS (irritable bowel syndrome) (8/3/2017)      GERD (gastroesophageal reflux disease) (11/27/2017)      Primary osteoarthritis involving multiple joints (10/19/2022)      MIAN (generalized anxiety disorder) (8/3/2017)      Iron deficiency anemia (8/3/2017)     ___________________________________________________  PLAN:    1. Continue  IV Levaquin and Flagyl for colitis, C. Diff neg and Enteric pathogen panel negative  2. Repeat CT abdomen pelvis shows diffuse colitis  3.   Continue IV hydration with D5-1/2 NS-will decrease rate a little since she tolerated some liquid yesterday orally  4. She tolerated some full liquids yesterday  5. Imodium as needed  6. Continue probiotic  7. As needed Zofran  8. Replete potassium which is low on admission 2.9, now 3.7 after yesterday when IV runs were given each of the last 3 days. 9.  Check magnesium level, normal 1.7  10. Continue Synthroid 0.88 daily  11   Continue Protonix but changed to IV  12. Continue Ativan 0.5 nightly as needed 13. Continue home eyedrops  14   Continue Zyrtec 10 mg daily  15. Continue folic acid 2 mg daily       50 Minutes spent today in direct care of this high complexity patient with greater than 50% in counseling and coordination of care.     If need to contact me use hospital  [phone], DO NOT USE PERFECT SERVE    ___________________________________________________    Attending Physician: Patrick Orellana MD

## 2023-02-04 LAB
ANION GAP SERPL CALC-SCNC: 5 MMOL/L (ref 5–15)
BUN SERPL-MCNC: 1 MG/DL (ref 6–20)
BUN/CREAT SERPL: 1 (ref 12–20)
CALCIUM SERPL-MCNC: 8.6 MG/DL (ref 8.5–10.1)
CHLORIDE SERPL-SCNC: 114 MMOL/L (ref 97–108)
CO2 SERPL-SCNC: 21 MMOL/L (ref 21–32)
CREAT SERPL-MCNC: 0.76 MG/DL (ref 0.55–1.02)
GLUCOSE SERPL-MCNC: 95 MG/DL (ref 65–100)
POTASSIUM SERPL-SCNC: 3.4 MMOL/L (ref 3.5–5.1)
SODIUM SERPL-SCNC: 140 MMOL/L (ref 136–145)

## 2023-02-04 PROCEDURE — 65270000029 HC RM PRIVATE

## 2023-02-04 PROCEDURE — 99233 SBSQ HOSP IP/OBS HIGH 50: CPT | Performed by: FAMILY MEDICINE

## 2023-02-04 PROCEDURE — C9113 INJ PANTOPRAZOLE SODIUM, VIA: HCPCS | Performed by: INTERNAL MEDICINE

## 2023-02-04 PROCEDURE — 74011000250 HC RX REV CODE- 250: Performed by: INTERNAL MEDICINE

## 2023-02-04 PROCEDURE — 74011250637 HC RX REV CODE- 250/637: Performed by: INTERNAL MEDICINE

## 2023-02-04 PROCEDURE — 74011250636 HC RX REV CODE- 250/636: Performed by: INTERNAL MEDICINE

## 2023-02-04 PROCEDURE — 74011250637 HC RX REV CODE- 250/637: Performed by: FAMILY MEDICINE

## 2023-02-04 RX ORDER — PANTOPRAZOLE SODIUM 40 MG/1
40 TABLET, DELAYED RELEASE ORAL DAILY
Status: DISCONTINUED | OUTPATIENT
Start: 2023-02-05 | End: 2023-02-05 | Stop reason: HOSPADM

## 2023-02-04 RX ORDER — POTASSIUM CHLORIDE 750 MG/1
20 TABLET, FILM COATED, EXTENDED RELEASE ORAL
Status: COMPLETED | OUTPATIENT
Start: 2023-02-04 | End: 2023-02-04

## 2023-02-04 RX ORDER — METRONIDAZOLE 250 MG/1
500 TABLET ORAL EVERY 8 HOURS
Status: DISCONTINUED | OUTPATIENT
Start: 2023-02-04 | End: 2023-02-05 | Stop reason: HOSPADM

## 2023-02-04 RX ORDER — LEVOFLOXACIN 500 MG/1
500 TABLET, FILM COATED ORAL EVERY 24 HOURS
Status: DISCONTINUED | OUTPATIENT
Start: 2023-02-04 | End: 2023-02-05 | Stop reason: HOSPADM

## 2023-02-04 RX ADMIN — METRONIDAZOLE 500 MG: 250 TABLET ORAL at 02:25

## 2023-02-04 RX ADMIN — POTASSIUM CHLORIDE 20 MEQ: 750 TABLET, FILM COATED, EXTENDED RELEASE ORAL at 02:25

## 2023-02-04 RX ADMIN — CETIRIZINE HYDROCHLORIDE 10 MG: 10 TABLET, FILM COATED ORAL at 10:19

## 2023-02-04 RX ADMIN — METRONIDAZOLE 500 MG: 250 TABLET ORAL at 18:53

## 2023-02-04 RX ADMIN — LEVOTHYROXINE SODIUM 88 MCG: 0.09 TABLET ORAL at 05:27

## 2023-02-04 RX ADMIN — Medication 1 CAPSULE: at 10:19

## 2023-02-04 RX ADMIN — LEVOFLOXACIN 500 MG: 500 TABLET, FILM COATED ORAL at 16:28

## 2023-02-04 RX ADMIN — FOLIC ACID 2 MG: 1 TABLET ORAL at 10:19

## 2023-02-04 NOTE — PROGRESS NOTES
End of Shift Note    Bedside shift change report given to Rebecca (oncoming nurse) by Daniela Montano, RN (offgoing nurse). Report included the following information SBAR, Kardex, Procedure Summary, Intake/Output, MAR, Accordion, Recent Results, and Med Rec Status    Shift worked:  7a-7p     Shift summary and any significant changes:    Pt seen by Dr Isaiah Hanson, [facility] to leave IV out per Dr Isaiah Hanson all meds chg to po. Pt franky diet, feeling well. Concerns for physician to address:  none     Zone phone for oncoming shift:   [phone]       Activity:  Activity Level: Up with Assistance  Number times ambulated in hallways past shift: 3  Number of times OOB to chair past shift: 1, majority of shift    Cardiac:   Cardiac Monitoring: No      Cardiac Rhythm: Sinus Rhythm    Access:  Current line(s): no access     Genitourinary:   Urinary status: voiding    Respiratory:   O2 Device: None (Room air)  Chronic home O2 use?: NO  Incentive spirometer at bedside: YES       GI:  Last Bowel Movement Date: 02/03/23  Current diet:  ADULT DIET Regular;  Low Sodium (2 gm)  Passing flatus: YES  Tolerating current diet: YES       Pain Management:   Patient states pain is manageable on current regimen: N/A    Skin:  Obed Score: 21  Interventions: nutritional support     Patient Safety:  Fall Score:    Interventions: gripper socks       Length of Stay:  Expected LOS: 2d 14h  Actual LOS: 4      Daniela Montano, RN

## 2023-02-04 NOTE — PROGRESS NOTES
General Daily Progress Note    Admit Date: 1/31/2023  Hospital day 5    Subjective:     Patient has no complaint of fever, chills, abdominal pain. Diarrhea has cleared up. Still feels somewhat weak. ..   Medication side effects: none    Current Facility-Administered Medications   Medication Dose Route Frequency    metroNIDAZOLE (FLAGYL) tablet 500 mg  500 mg Oral Q8H    levoFLOXacin (LEVAQUIN) tablet 500 mg  500 mg Oral Q24H    [START ON 2/5/2023] pantoprazole (PROTONIX) tablet 40 mg  40 mg Oral DAILY    zinc oxide-cod liver oil (DESITIN) 40 % paste   Topical PRN    levothyroxine (SYNTHROID) tablet 88 mcg  88 mcg Oral 6am    folic acid (FOLVITE) tablet 2 mg  2 mg Oral DAILY    cetirizine (ZYRTEC) tablet 10 mg  10 mg Oral DAILY    ondansetron (ZOFRAN ODT) tablet 4 mg  4 mg Oral Q8H PRN    L.acidophilus-paracasei-S.thermophil-bifidobacter (RISAQUAD) 8 billion cell capsule  1 Capsule Oral DAILY    loperamide (IMODIUM) capsule 2 mg  2 mg Oral Q4H PRN    LORazepam (ATIVAN) tablet 0.5 mg  0.5 mg Oral QHS PRN        Review of Systems  Pertinent items are noted in HPI. Objective:     Patient Vitals for the past 8 hrs:   BP Temp Pulse Resp SpO2   02/04/23 1009 120/71 98.1 °F (36.7 °C) 79 16 98 %     No intake/output data recorded. 02/02 1901 - 02/04 0700  In: 3209.6 [P.O.:300; I.V.:2909.6]  Out: 500 [Urine:500]    Physical Exam: Visit Vitals  /71 (BP 1 Location: Left upper arm, BP Patient Position: Sitting)   Pulse 79   Temp 98.1 °F (36.7 °C)   Resp 16   Ht 4' 11\" (1.499 m)   Wt 121 lb 14.6 oz (55.3 kg)   SpO2 98%   BMI 24.62 kg/m²     Lungs: clear to auscultation bilaterally  Heart: regular rate and rhythm, S1, S2 normal, no murmur, click, rub or gallop  Abdomen: soft, non-tender.  Bowel sounds normal. No masses,  no organomegaly  Extremities: extremities normal, atraumatic, no cyanosis or edema      ECG: normal sinus rhythm     Data Review   Recent Results (from the past 24 hour(s))   CBC W/O DIFF    Collection Time: 02/03/23 11:44 PM   Result Value Ref Range    WBC 6.4 3.6 - 11.0 K/uL    RBC 4.12 3.80 - 5.20 M/uL    HGB 12.4 11.5 - 16.0 g/dL    HCT 36.7 35.0 - 47.0 %    MCV 89.1 80.0 - 99.0 FL    MCH 30.1 26.0 - 34.0 PG    MCHC 33.8 30.0 - 36.5 g/dL    RDW 15.0 (H) 11.5 - 14.5 %    PLATELET 185 133 - 543 K/uL    MPV 9.0 8.9 - 12.9 FL    NRBC 0.0 0  WBC    ABSOLUTE NRBC 0.00 0.00 - 9.91 K/uL   METABOLIC PANEL, BASIC    Collection Time: 02/03/23 11:44 PM   Result Value Ref Range    Sodium 140 136 - 145 mmol/L    Potassium 3.4 (L) 3.5 - 5.1 mmol/L    Chloride 114 (H) 97 - 108 mmol/L    CO2 21 21 - 32 mmol/L    Anion gap 5 5 - 15 mmol/L    Glucose 95 65 - 100 mg/dL    BUN 1 (L) 6 - 20 MG/DL    Creatinine 0.76 0.55 - 1.02 MG/DL    BUN/Creatinine ratio 1 (L) 12 - 20      eGFR >60 >60 ml/min/1.73m2    Calcium 8.6 8.5 - 10.1 MG/DL           Assessment:     Principal Problem:    Acute colitis (1/31/2023)    Active Problems:    IBS (irritable bowel syndrome) (8/3/2017)      Iron deficiency anemia (8/3/2017)      MIAN (generalized anxiety disorder) (8/3/2017)      GERD (gastroesophageal reflux disease) (11/27/2017)      Primary osteoarthritis involving multiple joints (10/19/2022)        Plan:     1. Continue   Levaquin and Flagyl for colitis, C. Diff neg and Enteric pathogen panel negative  2. Repeat CT abdomen pelvis shows diffuse colitis  3. iv access has come out and having difficulty starting a new iv. As seems to be improving, will leave out, advance diet, and change antibiotics to po. If stable tomorrow, dc home. Daughter had pt get upand walk down [place]- she did well. 4.  She tolerated some full liquids yesterday  5. Imodium as needed  6. Continue probiotic  7. As needed Zofran  8. Replete potassium which is low on admission 2.9, now 3.7 after yesterday when IV runs were given each of the last 3 days. 9.  Check magnesium level, normal 1.7  10.   Continue Synthroid 0.88 daily  11   Continue Protonix but changed to IV  12. Continue Ativan 0.5 nightly as needed 13. Continue home eyedrops  14   Continue Zyrtec 10 mg daily  15.   Continue folic acid 2 mg daily

## 2023-02-04 NOTE — PROGRESS NOTES
RAPID RESPONSE:     Called for IV start since several RN's have attempted unsuccessfully. Upon arrival, it appears patient has had windy AC infiltrations with erythema, edema and tenderness at sites. Ordered IVF are D5 1/2NS + 20meq KCL @ 75ml/hr which had been running for several days as patient was admitted with hypokalemia. No diuretics on MAR. Last diarrhea this morning, with only one episode today. K this morning 3.7.   20g PIV started R lateral upper arm using ultrasound guidance x 1 attempt. Blood for \"am labs\" drawn and sent. Will hang D5 1/2NS at previous rate at this time pending labs, then will notify on call MD to request order to remove K from IVF and replace electrolytes with po form if necessary.

## 2023-02-04 NOTE — PROGRESS NOTES
Called for IV start since several RNs have attempted unsucessfully. Upon arrival, it appears patient has had bilateral AC infiltrations with erythema, edema and tenderness at sites. Ordered IVF are D5 1/2NS +20meq KCL @ 75ml/hr which have been running for several days as patient was admitted with hypokalemia. No diuretics on MAR. Last reported diarrhea was this morning with only one episode today. K this morning 3.7. Patient on full liquid diet, so able to take po.  20g PIV started in R lateral upper arm using ultrasound guidance x 1 attempt. Blood for \"am\" labs drawn and sent. Will hang D5 1/2 NS at previous rate at this time pending labs, then will notify on call MD to request order to remove K from IVF given repeated infiltrations and replace electrolytes with po form if necessary.

## 2023-02-04 NOTE — PROGRESS NOTES
End of Shift Note    Bedside shift change report given to Jose Updoxjennifer Worthington  (oncoming nurse) by Clyde Palumbo LPN (offgoing nurse). Report included the following information SBAR, Intake/Output, MAR, Accordion, Recent Results, and Med Rec Status    Shift worked:  7p-7a     Shift summary and any significant changes:    IV infiltrated at beginning of shift. Several IV attempts by several nurses, Rapid Response Nurse called and got an IV in R upper arm which infitrated within 1hr. On call MD notified and ordered po potassium,po antibiotic and PICC line insertion. Pt ambulated to bathroom x2      Concerns for physician to address:   3.4      Zone phone for oncoming shift:  [phone]       Activity:  Activity Level: Up with Assistance  Number times ambulated in hallways past shift: 0  Number of times OOB to chair past shift: 0    Cardiac:   Cardiac Monitoring: No      Cardiac Rhythm: Sinus Rhythm    Access:  Current line(s): PIV     Genitourinary:   Urinary status: voiding    Respiratory:   O2 Device: None (Room air)  Chronic home O2 use?: NO  Incentive spirometer at bedside: NO       GI:  Last Bowel Movement Date: 02/03/23  Current diet:  ADULT DIET Full Liquid; GI Stow (GERD/Peptic Ulcer);  Lactose-Controlled  Passing flatus: YES  Tolerating current diet: YES       Pain Management:   Patient states pain is manageable on current regimen: YES    Skin:  Obed Score: 21  Interventions: increase time out of bed    Patient Safety:  Fall Score:    Interventions: gripper socks and pt to call before getting OOB       Length of Stay:  Expected LOS: 2d 14h  Actual LOS: [address], LPN

## 2023-02-04 NOTE — PROGRESS NOTES
Problem: Falls - Risk of  Goal: *Absence of Falls  Description: Document Brittney Parker Fall Risk and appropriate interventions in the flowsheet.   Outcome: Progressing Towards Goal  Note: Fall Risk Interventions:

## 2023-02-05 VITALS
SYSTOLIC BLOOD PRESSURE: 146 MMHG | TEMPERATURE: 98.8 F | WEIGHT: 121.91 LBS | DIASTOLIC BLOOD PRESSURE: 72 MMHG | RESPIRATION RATE: 18 BRPM | HEART RATE: 84 BPM | OXYGEN SATURATION: 93 % | BODY MASS INDEX: 24.58 KG/M2 | HEIGHT: 59 IN

## 2023-02-05 LAB
ANION GAP SERPL CALC-SCNC: 2 MMOL/L (ref 5–15)
BUN SERPL-MCNC: 5 MG/DL (ref 6–20)
BUN/CREAT SERPL: 6 (ref 12–20)
CALCIUM SERPL-MCNC: 8.8 MG/DL (ref 8.5–10.1)
CHLORIDE SERPL-SCNC: 110 MMOL/L (ref 97–108)
CO2 SERPL-SCNC: 27 MMOL/L (ref 21–32)
CREAT SERPL-MCNC: 0.78 MG/DL (ref 0.55–1.02)
ERYTHROCYTE [DISTWIDTH] IN BLOOD BY AUTOMATED COUNT: 15 % (ref 11.5–14.5)
GLUCOSE SERPL-MCNC: 103 MG/DL (ref 65–100)
HCT VFR BLD AUTO: 37 % (ref 35–47)
HGB BLD-MCNC: 12.5 G/DL (ref 11.5–16)
MCH RBC QN AUTO: 30.1 PG (ref 26–34)
MCHC RBC AUTO-ENTMCNC: 33.8 G/DL (ref 30–36.5)
MCV RBC AUTO: 89.2 FL (ref 80–99)
NRBC # BLD: 0 K/UL (ref 0–0.01)
NRBC BLD-RTO: 0 PER 100 WBC
PLATELET # BLD AUTO: 294 K/UL (ref 150–400)
PMV BLD AUTO: 8.9 FL (ref 8.9–12.9)
POTASSIUM SERPL-SCNC: 3.9 MMOL/L (ref 3.5–5.1)
RBC # BLD AUTO: 4.15 M/UL (ref 3.8–5.2)
SODIUM SERPL-SCNC: 139 MMOL/L (ref 136–145)
WBC # BLD AUTO: 6.1 K/UL (ref 3.6–11)

## 2023-02-05 PROCEDURE — 85027 COMPLETE CBC AUTOMATED: CPT

## 2023-02-05 PROCEDURE — 36415 COLL VENOUS BLD VENIPUNCTURE: CPT

## 2023-02-05 PROCEDURE — 74011250637 HC RX REV CODE- 250/637: Performed by: INTERNAL MEDICINE

## 2023-02-05 PROCEDURE — 80048 BASIC METABOLIC PNL TOTAL CA: CPT

## 2023-02-05 PROCEDURE — 74011250637 HC RX REV CODE- 250/637: Performed by: FAMILY MEDICINE

## 2023-02-05 PROCEDURE — 99239 HOSP IP/OBS DSCHRG MGMT >30: CPT | Performed by: FAMILY MEDICINE

## 2023-02-05 RX ORDER — DICYCLOMINE HYDROCHLORIDE 10 MG/1
CAPSULE ORAL
Qty: 120 CAPSULE | Refills: 3 | Status: SHIPPED | OUTPATIENT
Start: 2023-02-05

## 2023-02-05 RX ORDER — LOPERAMIDE HYDROCHLORIDE 2 MG/1
2 CAPSULE ORAL
Qty: 30 CAPSULE | Refills: 1 | Status: SHIPPED
Start: 2023-02-05 | End: 2023-02-15

## 2023-02-05 RX ORDER — METRONIDAZOLE 500 MG/1
500 TABLET ORAL 3 TIMES DAILY
Qty: 15 TABLET | Refills: 0 | Status: SHIPPED | OUTPATIENT
Start: 2023-02-05 | End: 2023-02-14 | Stop reason: ALTCHOICE

## 2023-02-05 RX ORDER — LEVOFLOXACIN 500 MG/1
500 TABLET, FILM COATED ORAL EVERY 24 HOURS
Qty: 5 TABLET | Refills: 0 | Status: SHIPPED | OUTPATIENT
Start: 2023-02-05 | End: 2023-02-14 | Stop reason: ALTCHOICE

## 2023-02-05 RX ADMIN — CETIRIZINE HYDROCHLORIDE 10 MG: 10 TABLET, FILM COATED ORAL at 09:02

## 2023-02-05 RX ADMIN — METRONIDAZOLE 500 MG: 250 TABLET ORAL at 03:26

## 2023-02-05 RX ADMIN — METRONIDAZOLE 500 MG: 250 TABLET ORAL at 10:55

## 2023-02-05 RX ADMIN — LEVOTHYROXINE SODIUM 88 MCG: 0.09 TABLET ORAL at 06:11

## 2023-02-05 RX ADMIN — LEVOFLOXACIN 500 MG: 500 TABLET, FILM COATED ORAL at 12:53

## 2023-02-05 RX ADMIN — FOLIC ACID 2 MG: 1 TABLET ORAL at 09:02

## 2023-02-05 RX ADMIN — Medication 1 CAPSULE: at 09:02

## 2023-02-05 RX ADMIN — PANTOPRAZOLE SODIUM 40 MG: 40 TABLET, DELAYED RELEASE ORAL at 09:02

## 2023-02-05 NOTE — PROGRESS NOTES
End of Shift Note    Bedside shift change report given to Devan Phan (oncoming nurse) by Helen Weeks LPN (offgoing nurse). Report included the following information SBAR, Kardex, Procedure Summary, Intake/Output, MAR, and Recent Results    Shift worked:  7p-730a     Shift summary and any significant changes:     Pt rested in bed through shift. No complaints of pain/nausea. AM labs drawn. Otherwise, pt had an uneventful shift. Concerns for physician to address:       Zone phone for oncoming shift:          Activity:  Activity Level: Up with Assistance  Number times ambulated in hallways past shift: 0  Number of times OOB to chair past shift: 0    Cardiac:   Cardiac Monitoring: No      Cardiac Rhythm: Sinus Rhythm    Access:   Current line(s): NONE    Genitourinary:   Urinary status: voiding    Respiratory:   O2 Device: None (Room air)  Chronic home O2 use?: NO  Incentive spirometer at bedside: YES     GI:  Last Bowel Movement Date: 02/03/23  Current diet:  ADULT DIET Regular;  Low Sodium (2 gm)  Passing flatus: YES  Tolerating current diet: YES       Pain Management:   Patient states pain is manageable on current regimen: YES    Skin:  Obed Score: 21  Interventions: increase time out of bed    Patient Safety:  Fall Score:    Interventions: gripper socks and pt to call before getting OOB       Length of Stay:  Expected LOS: 2d 14h  Actual LOS: Shaheed Gardner, LPN

## 2023-02-05 NOTE — PROGRESS NOTES
Comprehensive Nutrition Assessment    Type and Reason for Visit: Patient education, Initial    Nutrition Recommendations/Plan:   Continue Low Na+ diet. Encouraged pt to be mindful of high fat foods per hx of GERD and IBS. Please document % meals and supplements consumed in flowsheet I/O's under intake. Malnutrition Assessment:  Malnutrition Status:  No malnutrition (02/05/23 5386)      Nutrition Assessment:    2/5: Chart reviewed; med noted for acute colitis on admission. RD received a nutrition consult re: diverticulitis which was requested by pt. RD visited with pt at bedside to address education needs and pt reports that she is doing well now, tolerating diet and did not need low fiber diet ed per diverticulitis. We did briefly discuss consuming a low fat diet per GERD and IBS which pt agreeable to. Pt looking forward to being discharged today.  at bedside. No data found. Nutrition Related Findings:    BM: 2/5; Labs: reviewed; Meds: reviewed Wound Type: None    Current Nutrition Intake & Therapies:        ADULT DIET Regular; Low Sodium (2 gm)    Anthropometric Measures:  Height: 4' 11\" (149.9 cm)  Ideal Body Weight (IBW): 95 lbs (43 kg)     Current Body Wt:  55.3 kg (121 lb 14.6 oz), 128.3 % IBW. Current BMI (kg/m2): 24.6                          BMI Category: Normal weight (BMI 18.5-24. 9)    Estimated Daily Nutrient Needs:  Energy Requirements Based On: Formula  Weight Used for Energy Requirements: Current  Energy (kcal/day): 1200 (BMR x 1. 3AF)     Protein (g/day): 55-66 (1.0-1.2 g/kg bw)  Method Used for Fluid Requirements: 1 ml/kcal  Fluid (ml/day): 1200 ml/day    Nutrition Diagnosis:   No nutrition diagnosis at this time     Nutrition Interventions:   Food and/or Nutrient Delivery: Continue current diet  Nutrition Education/Counseling: Education not indicated  Coordination of Nutrition Care: Continue to monitor while inpatient       Goals:     Goals: PO intake 50% or greater, by next RD assessment       Nutrition Monitoring and Evaluation:   Behavioral-Environmental Outcomes: None identified  Food/Nutrient Intake Outcomes: Food and nutrient intake  Physical Signs/Symptoms Outcomes: Biochemical data, Skin, Weight    Discharge Planning:    Continue current diet    Shad Powers RD  Contact:

## 2023-02-05 NOTE — PROGRESS NOTES
Bedside and Verbal shift change report given to RN (oncoming nurse) by Es Ramirez RN (offgoing nurse).  Report included the following information SBAR and Kardex

## 2023-02-05 NOTE — PROGRESS NOTES
Doing well. Eating regular diet. No pain, one bm yesterday. Will dc home. discharge summary dictated.

## 2023-02-05 NOTE — PROGRESS NOTES
Problem: Risk for Spread of Infection  Goal: Prevent transmission of infectious organism to others  Description: Prevent the transmission of infectious organisms to other patients, staff members, and visitors.   2/5/2023 1156 by Dayana Vega RN  Outcome: Resolved/Met  2/5/2023 1033 by Dayana Vega RN  Outcome: Progressing Towards Goal

## 2023-02-06 NOTE — DISCHARGE SUMMARY
[address] [place] [address] SUMMARY    Name:  Lorena Bond  MR#:  [id number]  :  1940  ACCOUNT #:  [de-identified]  ADMIT DATE:  2023  DISCHARGE DATE:  2023    FINAL DIAGNOSES:  1. Acute colitis. 2.  Gastroesophageal reflux disease. Please note that over 30 minutes of time was spent on discharge on the day of discharge. HISTORY OF PRESENT ILLNESS:  The patient is an 25-year-old white female who presented to the office with abdominal pain. She was followed for gastroesophageal reflux disease, IBS, degenerative joint disease, anxiety with depression, hypothyroidism and other medical problems. She came to the office on the day of admission with severe abdominal pain and diarrhea, some 10 times per day and has been present for 8 days. She initially had nausea and vomiting and that had resolved. No fever or chills. Seen at the [facility] ER two days ago at which time white count was 13,100. CT scan of the abdomen was consistent with acute colitis. She was empirically placed on Cipro and Flagyl, which she had been taking regularly. She has also had 2 L of IV fluid while in the emergency room for dehydration, unfortunately continued with persistent frequent watery diarrhea. Appetite had been very poor. No recent fever or chills. She also on the day of admission had morbid tenderness of the lower abdomen, increased in the left lower quadrant, subsequently was admitted to the hospital.   ischemic colitis was consideration. PAST MEDICAL HISTORY:  Significant for sinusitis, anxiety, arthritis, psoriasis, endometrial cancer, elevated liver test, depression, gastroesophageal reflux disease, hyperlipidemia, hypothyroidism, IBS, iron deficiency anemia, osteoporosis, sciatica, thyroid disease. PAST SURGICAL HISTORY:  Includes several colonoscopies, appendectomy, hysterectomy, laser vision surgery, lumbar laminectomy.     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 HABITS:  Smoking quit 1979. Alcohol is one glass of wine a day. ALLERGIES:  CEPHALOSPORIN, CODEINE, AND PENICILLIN. MEDICATIONS ON ADMISSION:  1. Zyrtec 10 mg daily. 2.  Protonix 40 mg daily. 3.  Cipro 250 mg b.i.d.  4.  Tylenol p.r.n.  5.  Folic acid 474 mg daily. 6.  Synthroid 88 mcg daily. 7.  Lorazepam 0.5 mg at bedtime. 8.  Various eye drops. REVIEW OF SYSTEMS:  Please see HPI. PHYSICAL EXAMINATION:  VITAL SIGNS:  On admission, blood pressure 126/96, pulse 74, temperature 98.2, respiratory rate 16, height 4 feet 11, weight 121. GENERAL:  Alert and cooperative. NECK:  Supple, no thyromegaly, no carotid bruits. LUNGS:  Clear to auscultation and percussion. No wheezes. CARDIOVASCULAR:  Regular rhythm and rate. No murmurs, thrills, rubs or gallops. ABDOMEN:  Soft, markedly obese and nontender, worse in the left lower quadrant. EXTREMITIES:  Without edema. LABORATORY DATA:  Potassium is 2.9 on admission. Lipase is 59. White count 8000, hematocrit 37.9, platelet 077,190. HOSPITAL COURSE:  The patient was admitted, treated with IV fluids, also treated with IV Levaquin and Flagyl. Symptoms slowly improved on this regimen. At the time of discharge, she is tolerating a regular diet, was pain-free and up walking in the swift. She did have a repeat CT scan of the abdomen and pelvis which showed diffuse colitis. The patient is to continued p.o. antibiotics for 5 more days. Followup with Dr. Teressa Chacon later this week.         Marion Walker MD      [id number]  D:  02/05/2023 11:42  T:  02/05/2023 23:45  JOB #:  [id number]

## 2023-02-07 ENCOUNTER — OFFICE VISIT (OUTPATIENT)
Dept: INTERNAL MEDICINE CLINIC | Age: 83
End: 2023-02-07
Payer: MEDICARE

## 2023-02-07 VITALS
OXYGEN SATURATION: 97 % | HEART RATE: 83 BPM | WEIGHT: 116.9 LBS | BODY MASS INDEX: 23.61 KG/M2 | DIASTOLIC BLOOD PRESSURE: 55 MMHG | SYSTOLIC BLOOD PRESSURE: 105 MMHG

## 2023-02-07 DIAGNOSIS — K58.0 IRRITABLE BOWEL SYNDROME WITH DIARRHEA: ICD-10-CM

## 2023-02-07 DIAGNOSIS — F41.1 GAD (GENERALIZED ANXIETY DISORDER): ICD-10-CM

## 2023-02-07 DIAGNOSIS — D50.9 IRON DEFICIENCY ANEMIA, UNSPECIFIED IRON DEFICIENCY ANEMIA TYPE: ICD-10-CM

## 2023-02-07 DIAGNOSIS — K52.9 ACUTE COLITIS: Primary | ICD-10-CM

## 2023-02-07 DIAGNOSIS — K21.9 GASTROESOPHAGEAL REFLUX DISEASE, UNSPECIFIED WHETHER ESOPHAGITIS PRESENT: ICD-10-CM

## 2023-02-07 PROCEDURE — 99496 TRANSJ CARE MGMT HIGH F2F 7D: CPT | Performed by: INTERNAL MEDICINE

## 2023-02-07 PROCEDURE — G8427 DOCREV CUR MEDS BY ELIG CLIN: HCPCS | Performed by: INTERNAL MEDICINE

## 2023-02-07 NOTE — PROGRESS NOTES
Hospital followup  1. Have you been to the ER, urgent care clinic since your last visit? Hospitalized since your last visit?no    2. Have you seen or consulted any other health care providers outside of the [address] [place] [address] since your last visit? Include any pap smears or colon screening.  no

## 2023-02-07 NOTE — PROGRESS NOTES
No chief complaint on file. HPI:  Yazmin Titus is a 78y.o. year old female who is here for a follow up visit for hospitalization transition of care. She was last seen by me on 1/31/2023 at the office on 2/3/2023 in the hospital.     Discharged on: 2/5/2023    Diagnosis in hospital:  1. Acute colitis  2. GERD  3. IBS  4. Iron deficiency anemia  5   Hypothyroidism    Complications in hospital: No complications    Medication changes: Take Levaquin 500 daily for 5 days and Flagyl 500mg 3 times daily for 5 days    Discharge Summary reviewed. Today 2/7/2023      She reports the following: Since discharge from the hospital 2 days ago she seems to be doing okay. Her energy level is improving. Her bowel movements are loose but not watery. She notes less abdominal pain. She has no nausea vomiting no other GI complaints. She denies any chest pain, shortness of breath or cardiorespiratory complaints. She has no  complaints. She denies any neurologic complaints other than the weakness. There are no arthritic complaints and she has no other complaints on complete review of systems.           Visit Vitals  BP (!) 105/22 (BP 1 Location: Left arm, BP Patient Position: Sitting)   Pulse 83   Wt 116 lb 14.4 oz (53 kg)   SpO2 97%   BMI 23.61 kg/m²       Historical Data    Past Medical History:   Diagnosis Date    Acute recurrent pansinusitis 2/23/2018    Annual physical exam 8/3/2017    Anxiety 8/3/2017    Arthralgia 8/3/2017    Arthritis     Arthritis of right hip 8/3/2017    Arthritis, hip 8/3/2017    Autoimmune disease ([address]     psoriasis    Avascular necrosis of femoral head ([address] left 8/3/2017    Bone loss 8/3/2017    Cancer (HCC)     endometrial    Chronic eczematous otitis externa of both ears 8/24/2017    Contusion of left knee, initial encounter 8/3/2017    Degenerative arthritis of lumbar spine 8/3/2017    Depression     Elevated liver function tests 8/3/2017    MIAN (generalized anxiety disorder) 8/3/2017    GERD (gastroesophageal reflux disease)     Glaucoma     Glaucoma 8/3/2017    Headache, acute 8/3/2017    Herpes zoster without complication 71/62/2052    History of colonoscopy with polypectomy 8/3/2017    History of endometrial cancer 8/3/2017    Hyperlipidemia 8/3/2017    Hypothyroid 8/3/2017    IBS (irritable bowel syndrome) 8/3/2017    AMPARO (iron deficiency anemia) 8/3/2017    Insomnia 8/3/2017    Labral tear of hip, degenerative 10/24/2017    Long-term use of immunosuppressant medication 8/3/2017    Medication side effect 8/3/2017    Melena 8/3/2017    Monilial vaginitis 8/24/2017    Muscle spasm 8/3/2017    Osteoporosis 8/3/2017    Other ill-defined conditions(799.89)     high cholesterol    Other ill-defined conditions(799.89)     psoriasis    Post-menopausal 8/3/2017    Psoriasis 8/3/2017    Pyuria, sterile 8/3/2017    Rash 8/3/2017    Rosacea blepharoconjunctivitis 8/3/2017    Sciatica 8/3/2017    Somatic dysfunction of cervical region 8/3/2017    Spondylosis of cervical region without myelopathy or radiculopathy 8/3/2017    Thrush 8/3/2017    Thyroid disease     Urticaria 8/3/2017       Past Surgical History:   Procedure Laterality Date    COLONOSCOPY N/A 6/12/2019    COLONOSCOPY performed by Earnestine Lopez MD at [facility]    COLONOSCOPY,DIAGNOSTIC  6/12/2019         COLORECTAL SCRN; HI RISK IND  4/2/2014         HX APPENDECTOMY      HX GYN      hysterectomy    HX HEENT      bilateral ear    HX HEENT      LASIK    HX ORTHOPAEDIC  1-2011    lumbar laminectomy    HX OTHER SURGICAL Bilateral     cataract extraction with lens implant    HX TONSILLECTOMY      [facility] UNLISTED NEUROLOGICAL/NEUROMUSCULAR DX PX  2011    spinal fusion       Outpatient Encounter Medications as of 2/7/2023   Medication Sig Dispense Refill    dicyclomine (BENTYL) 10 mg capsule TAKE 1 CAPSULE BY MOUTH 4 TIMES A  Capsule 3    levoFLOXacin (LEVAQUIN) 500 mg tablet Take 1 Tablet by mouth every twenty-four (24) hours. 5 Tablet 0    loperamide (IMODIUM) 2 mg capsule Take 1 Capsule by mouth every four (4) hours as needed for Diarrhea for up to 10 days. 30 Capsule 1    metroNIDAZOLE (FLAGYL) 500 mg tablet Take 1 Tablet by mouth three (3) times daily. 15 Tablet 0    Cetirizine (ZyrTEC) 10 mg cap Take  by mouth.      pantoprazole (PROTONIX) 40 mg tablet TAKE 1 TABLET BY MOUTH DAILY 90 Tablet 3    acetaminophen (TYLENOL) 500 mg tablet Take 500 mg by mouth every six (6) hours as needed for Pain. folic acid (FOLVITE) 1 mg tablet TAKE 2 TABLETS BY MOUTH DAILY 180 Tablet 2    levothyroxine (SYNTHROID) 88 mcg tablet TAKE 1 TABLET BY MOUTH DAILY 90 Tablet 3    LORazepam (Ativan) 0.5 mg tablet Take 1 Tablet by mouth nightly as needed (sleep). Max Daily Amount: 0.5 mg. 30 Tablet 0    dorzolamide-timoloL (COSOPT) 22.3-6.8 mg/mL ophthalmic solution Cosopt 22.3 mg-6.8 mg/mL eye drops   Prescribed by non [facility] MD      travoprost (TRAVATAN Z) 0.004 % ophthalmic solution Administer 1 Drop to both eyes nightly. No facility-administered encounter medications on file as of 2023. Allergies   Allergen Reactions    Cephalosporins Shortness of Breath    Codeine Nausea and Vomiting    Pcn [Penicillins] Rash        Social History     Socioeconomic History    Marital status:      Spouse name: Not on file    Number of children: Not on file    Years of education: Not on file    Highest education level: Not on file   Occupational History    Not on file   Tobacco Use    Smoking status: Former     Types: Cigarettes     Quit date: 1979     Years since quittin.0    Smokeless tobacco: Never   Vaping Use    Vaping Use: Never used   Substance and Sexual Activity    Alcohol use:  Yes     Alcohol/week: 7.0 standard drinks     Types: 7 Glasses of wine per week    Drug use: No    Sexual activity: Not on file   Other Topics Concern    Not on file   Social History Narrative    Not on file     Social Determinants of Health Financial Resource Strain: Not on file   Food Insecurity: Not on file   Transportation Needs: Not on file   Physical Activity: Not on file   Stress: Not on file   Social Connections: Not on file   Intimate Partner Violence: Not on file   Housing Stability: Not on file      REVIEW OF SYSTEMS:  General: negative for - chills or fever  ENT: negative for - headaches, nasal congestion or tinnitus  Eyes: no blurred or visual changes  Neck: No stiffness or swollen nodes  Respiratory: negative for - cough, hemoptysis, shortness of breath or wheezing  Cardiovascular : negative for - chest pain, edema, palpitations or shortness of breath  Gastrointestinal: negative for - abdominal pain, blood in stools, heartburn or nausea/vomiting  Genito-Urinary: no dysuria, trouble voiding, or hematuria  Musculoskeletal: negative for - gait disturbance, joint pain, joint stiffness or joint swelling  Neurological: no TIA or stroke symptoms  Hematologic: no bruises, no bleeding  Lymphatic: no swollen glands  Integument: no lumps, mole changes, nail changes or rash  Endocrine:no malaise/lethargy poly uria or polydipsia or unexpected weight changes      Visit Vitals  BP (!) 105/22 (BP 1 Location: Left arm, BP Patient Position: Sitting)   Pulse 83   Wt 116 lb 14.4 oz (53 kg)   SpO2 97%   BMI 23.61 kg/m²     CONSTITUTIONAL: well , well nourished, appears age appropriate  HEAD: normocephalic, atraumatic  EYES: sclera anicteric, PERRL, EOMI  ENMT:moist mucous membranes, pharynx clear          Nares: w/o erythema or edema  NECK: supple.  Thyroid normal, No JVD or bruits  RESPIRATORY: Chest: clear to ascultation and percussion   CARDIOVASCULAR: Heart: regular rate and rhythm no murmurs, rubs or gallops, PMI not displaced, no thrill  GASTROINTESTINAL: Abdomen: non distended, soft, mild tenderness lower abdomen without rebound, bowel sounds normal  HEMATOLOGIC: no petechiae or purpura  LYMPHATIC: no lymphadenopathy  MUSCULOSKELETAL: Extremities: no edema or active synovitis, pulse 1+   BACK; no point or CVAT  INTEGUMENT: No unusual rashes or suspicious skin lesions noted. Nails appear normal.  NEUROLOGIC: non-focal exam   MENTAL STATUS: alert and oriented, appropriate affect   PSYCHIATRIC: normal affect    ASSESSMENT:   1. Acute colitis    2. Irritable bowel syndrome with diarrhea    3. Gastroesophageal reflux disease, unspecified whether esophagitis present    4. Iron deficiency anemia, unspecified iron deficiency anemia type    5. MIAN (generalized anxiety disorder)      Impression  1. Acute colitis that seems to be doing quite well. We will continue her antibiotics until she completes her current course of which she has 3 more days. I will recheck her in a week. I am checking a CBC today. 2.  IBS which I do not think is currently playing a role in this issue. 3.  GERD that seems to be stable  4. Iron deficiency anemia we will see what the blood count looks like today. 5.  Generalized anxiety stable  High complexity patient with tenuous situation, with continued abdominal discomfort on exam.  High risk of repeat hospitalization and close follow-up warranted thus follow-up as scheduled again for 1 week. I will see her sooner if she tends to have increasing abdominal pain when she finishes her antibiotics in 3 days. All of this is discussed with her  present with her today. PLAN:  .  Orders Placed This Encounter    CBC WITH AUTOMATED DIFF    METABOLIC PANEL, BASIC         ATTENTION:   This medical record was transcribed using an electronic medical records system. Although proofread, it may and can contain electronic and spelling errors. Other human spelling and other errors may be present. Corrections may be executed at a later time. Please feel free to contact us for any clarifications as needed. Follow-up and Dispositions    Return in about 1 week (around 2/14/2023).            Trena Shepherd MD     Orders Placed This Encounter CBC WITH AUTOMATED DIFF     Standing Status:   Future     Standing Expiration Date:   1/7/6511    METABOLIC PANEL, BASIC     Standing Status:   Future     Standing Expiration Date:   2/6/2024          No results found for any visits on 02/07/23. I have reviewed the patient's medical history in detail and updated the computerized patient record. We had a prolonged discussion about these complex clinical issues and went over the various important aspects to consider. All questions were answered. Advised her to call back or return to office if symptoms do not improve, change in nature, or persist.    She was given an after visit summary or informed of [facility] which includes patient instructions, diagnoses, current medications, & vitals. She expressed understanding with the diagnosis and plan.

## 2023-02-08 LAB
ANION GAP SERPL CALC-SCNC: 8 MMOL/L (ref 5–15)
BASOPHILS # BLD: 0.1 K/UL (ref 0–0.1)
BASOPHILS NFR BLD: 2 % (ref 0–1)
BUN SERPL-MCNC: 10 MG/DL (ref 6–20)
BUN/CREAT SERPL: 11 (ref 12–20)
CALCIUM SERPL-MCNC: 9.4 MG/DL (ref 8.5–10.1)
CHLORIDE SERPL-SCNC: 109 MMOL/L (ref 97–108)
CO2 SERPL-SCNC: 24 MMOL/L (ref 21–32)
CREAT SERPL-MCNC: 0.95 MG/DL (ref 0.55–1.02)
DIFFERENTIAL METHOD BLD: ABNORMAL
EOSINOPHIL # BLD: 0.1 K/UL (ref 0–0.4)
EOSINOPHIL NFR BLD: 2 % (ref 0–7)
ERYTHROCYTE [DISTWIDTH] IN BLOOD BY AUTOMATED COUNT: 15.1 % (ref 11.5–14.5)
GLUCOSE SERPL-MCNC: 68 MG/DL (ref 65–100)
HCT VFR BLD AUTO: 40 % (ref 35–47)
HGB BLD-MCNC: 13.1 G/DL (ref 11.5–16)
IMM GRANULOCYTES # BLD AUTO: 0.1 K/UL (ref 0–0.04)
IMM GRANULOCYTES NFR BLD AUTO: 1 % (ref 0–0.5)
LYMPHOCYTES # BLD: 1.6 K/UL (ref 0.8–3.5)
LYMPHOCYTES NFR BLD: 19 % (ref 12–49)
MCH RBC QN AUTO: 30.4 PG (ref 26–34)
MCHC RBC AUTO-ENTMCNC: 32.8 G/DL (ref 30–36.5)
MCV RBC AUTO: 92.8 FL (ref 80–99)
MONOCYTES # BLD: 0.6 K/UL (ref 0–1)
MONOCYTES NFR BLD: 7 % (ref 5–13)
NEUTS SEG # BLD: 5.9 K/UL (ref 1.8–8)
NEUTS SEG NFR BLD: 69 % (ref 32–75)
NRBC # BLD: 0 K/UL (ref 0–0.01)
NRBC BLD-RTO: 0 PER 100 WBC
PLATELET # BLD AUTO: 342 K/UL (ref 150–400)
PMV BLD AUTO: 9.4 FL (ref 8.9–12.9)
POTASSIUM SERPL-SCNC: 4.1 MMOL/L (ref 3.5–5.1)
RBC # BLD AUTO: 4.31 M/UL (ref 3.8–5.2)
SODIUM SERPL-SCNC: 141 MMOL/L (ref 136–145)
WBC # BLD AUTO: 8.4 K/UL (ref 3.6–11)

## 2023-02-10 NOTE — PROGRESS NOTES
Per provider instructions, contacted patient with results: Labs stable no changes needed. Letter sent to patient.

## 2023-02-14 ENCOUNTER — OFFICE VISIT (OUTPATIENT)
Dept: INTERNAL MEDICINE CLINIC | Age: 83
End: 2023-02-14
Payer: MEDICARE

## 2023-02-14 VITALS
RESPIRATION RATE: 18 BRPM | TEMPERATURE: 97.9 F | WEIGHT: 119 LBS | HEART RATE: 96 BPM | BODY MASS INDEX: 23.99 KG/M2 | SYSTOLIC BLOOD PRESSURE: 112 MMHG | HEIGHT: 59 IN | OXYGEN SATURATION: 97 % | DIASTOLIC BLOOD PRESSURE: 79 MMHG

## 2023-02-14 DIAGNOSIS — F32.A DEPRESSION, UNSPECIFIED DEPRESSION TYPE: ICD-10-CM

## 2023-02-14 DIAGNOSIS — D50.9 IRON DEFICIENCY ANEMIA, UNSPECIFIED IRON DEFICIENCY ANEMIA TYPE: ICD-10-CM

## 2023-02-14 DIAGNOSIS — K52.9 ACUTE COLITIS: Primary | ICD-10-CM

## 2023-02-14 DIAGNOSIS — K58.0 IRRITABLE BOWEL SYNDROME WITH DIARRHEA: ICD-10-CM

## 2023-02-14 DIAGNOSIS — F41.1 GAD (GENERALIZED ANXIETY DISORDER): ICD-10-CM

## 2023-02-14 DIAGNOSIS — K21.9 GASTROESOPHAGEAL REFLUX DISEASE, UNSPECIFIED WHETHER ESOPHAGITIS PRESENT: ICD-10-CM

## 2023-02-14 PROCEDURE — 1123F ACP DISCUSS/DSCN MKR DOCD: CPT | Performed by: INTERNAL MEDICINE

## 2023-02-14 PROCEDURE — 1111F DSCHRG MED/CURRENT MED MERGE: CPT | Performed by: INTERNAL MEDICINE

## 2023-02-14 PROCEDURE — G8420 CALC BMI NORM PARAMETERS: HCPCS | Performed by: INTERNAL MEDICINE

## 2023-02-14 PROCEDURE — 1101F PT FALLS ASSESS-DOCD LE1/YR: CPT | Performed by: INTERNAL MEDICINE

## 2023-02-14 PROCEDURE — 99214 OFFICE O/P EST MOD 30 MIN: CPT | Performed by: INTERNAL MEDICINE

## 2023-02-14 PROCEDURE — 1090F PRES/ABSN URINE INCON ASSESS: CPT | Performed by: INTERNAL MEDICINE

## 2023-02-14 PROCEDURE — G8536 NO DOC ELDER MAL SCRN: HCPCS | Performed by: INTERNAL MEDICINE

## 2023-02-14 PROCEDURE — G9717 DOC PT DX DEP/BP F/U NT REQ: HCPCS | Performed by: INTERNAL MEDICINE

## 2023-02-14 PROCEDURE — G8399 PT W/DXA RESULTS DOCUMENT: HCPCS | Performed by: INTERNAL MEDICINE

## 2023-02-14 PROCEDURE — G8427 DOCREV CUR MEDS BY ELIG CLIN: HCPCS | Performed by: INTERNAL MEDICINE

## 2023-02-14 NOTE — PROGRESS NOTES
Chief Complaint   Patient presents with    Follow-up     1 week follow-up      Health Maintenance reviewed     1. Have you been to the ER, urgent care clinic since your last visit? Hospitalized since your last visit? No     2. Have you seen or consulted any other health care providers outside of the [address] [place] [address] since your last visit? Include any pap smears or colon screening.   No

## 2023-02-14 NOTE — PROGRESS NOTES
Chief Complaint   Patient presents with    Follow-up     1 week follow-up        SUBJECTIVE:    June B Reina Ibarra is a 78 y.o. female who was recently hospitalized secondary to acute colitis. She was discharged home on Levaquin and Flagyl which she has completed both of them now. She does have a few joint aches and pains which she did not have before and I explained to her that is likely related to the Levaquin should get better. She currently denies any chest pain, shortness of breath, palpitations, PND, orthopnea or other cardiac or respiratory complaints. She notes no current GI or  complaints. She has no headaches, dizziness or neurologic complaints she has no other complaints or complete review of systems except she notes her anxiety and depression have increased since she is off the Lexapro. Current Outpatient Medications   Medication Sig Dispense Refill    dicyclomine (BENTYL) 10 mg capsule TAKE 1 CAPSULE BY MOUTH 4 TIMES A  Capsule 3    loperamide (IMODIUM) 2 mg capsule Take 1 Capsule by mouth every four (4) hours as needed for Diarrhea for up to 10 days. 30 Capsule 1    Cetirizine (ZyrTEC) 10 mg cap Take  by mouth.      pantoprazole (PROTONIX) 40 mg tablet TAKE 1 TABLET BY MOUTH DAILY 90 Tablet 3    acetaminophen (TYLENOL) 500 mg tablet Take 500 mg by mouth every six (6) hours as needed for Pain. folic acid (FOLVITE) 1 mg tablet TAKE 2 TABLETS BY MOUTH DAILY 180 Tablet 2    levothyroxine (SYNTHROID) 88 mcg tablet TAKE 1 TABLET BY MOUTH DAILY 90 Tablet 3    LORazepam (Ativan) 0.5 mg tablet Take 1 Tablet by mouth nightly as needed (sleep). Max Daily Amount: 0.5 mg. 30 Tablet 0    dorzolamide-timoloL (COSOPT) 22.3-6.8 mg/mL ophthalmic solution Cosopt 22.3 mg-6.8 mg/mL eye drops   Prescribed by non [facility] MD      travoprost (TRAVATAN Z) 0.004 % ophthalmic solution Administer 1 Drop to both eyes nightly.        Past Medical History:   Diagnosis Date    Acute recurrent pansinusitis 2/23/2018    Annual physical exam 8/3/2017    Anxiety 8/3/2017    Arthralgia 8/3/2017    Arthritis     Arthritis of right hip 8/3/2017    Arthritis, hip 8/3/2017    Autoimmune disease ([facility] [address]     psoriasis    Avascular necrosis of femoral head ([facility] [address] left 8/3/2017    Bone loss 8/3/2017    Cancer ([facility] [address]     endometrial    Chronic eczematous otitis externa of both ears 8/24/2017    Contusion of left knee, initial encounter 8/3/2017    Degenerative arthritis of lumbar spine 8/3/2017    Depression     Elevated liver function tests 8/3/2017    MIAN (generalized anxiety disorder) 8/3/2017    GERD (gastroesophageal reflux disease)     Glaucoma     Glaucoma 8/3/2017    Headache, acute 8/3/2017    Herpes zoster without complication 20/74/5389    History of colonoscopy with polypectomy 8/3/2017    History of endometrial cancer 8/3/2017    Hyperlipidemia 8/3/2017    Hypothyroid 8/3/2017    IBS (irritable bowel syndrome) 8/3/2017    AMPARO (iron deficiency anemia) 8/3/2017    Insomnia 8/3/2017    Labral tear of hip, degenerative 10/24/2017    Long-term use of immunosuppressant medication 8/3/2017    Medication side effect 8/3/2017    Melena 8/3/2017    Monilial vaginitis 8/24/2017    Muscle spasm 8/3/2017    Osteoporosis 8/3/2017    Other ill-defined conditions(799.89)     high cholesterol    Other ill-defined conditions(799.89)     psoriasis    Post-menopausal 8/3/2017    Psoriasis 8/3/2017    Pyuria, sterile 8/3/2017    Rash 8/3/2017    Rosacea blepharoconjunctivitis 8/3/2017    Sciatica 8/3/2017    Somatic dysfunction of cervical region 8/3/2017    Spondylosis of cervical region without myelopathy or radiculopathy 8/3/2017    Thrush 8/3/2017    Thyroid disease     Urticaria 8/3/2017     Past Surgical History:   Procedure Laterality Date    COLONOSCOPY N/A 6/12/2019    COLONOSCOPY performed by Narcisa Dyer MD at [facility]  6/12/2019         COLORECTAL SCRN; HI RISK IND  4/2/2014 HX APPENDECTOMY      HX GYN      hysterectomy    HX HEENT      bilateral ear    HX HEENT      LASIK    HX ORTHOPAEDIC  -    lumbar laminectomy    HX OTHER SURGICAL Bilateral     cataract extraction with lens implant    HX TONSILLECTOMY      [place] UNLISTED NEUROLOGICAL/NEUROMUSCULAR DX PX  2011    spinal fusion     Allergies   Allergen Reactions    Cephalosporins Shortness of Breath    Codeine Nausea and Vomiting    Pcn [Penicillins] Rash       REVIEW OF SYSTEMS:  General: negative for - chills or fever, or weight loss or gain  ENT: negative for - headaches, nasal congestion or tinnitus  Eyes: no blurred or visual changes  Neck: No stiffness or swollen nodes  Respiratory: negative for - cough, hemoptysis, shortness of breath or wheezing  Cardiovascular : negative for - chest pain, edema, palpitations or shortness of breath  Gastrointestinal: negative for - abdominal pain, blood in stools, heartburn or nausea/vomiting  Genito-Urinary: no dysuria, trouble voiding, or hematuria  Musculoskeletal: negative for - gait disturbance, joint stiffness or joint swelling. Positive for some mild diffuse joint aches and pains  Neurological: no TIA or stroke symptoms  Hematologic: no bruises, no bleeding  Lymphatic: no swollen glands  Integument: no lumps, mole changes, nail changes or rash  Endocrine:no malaise/lethargy poly uria or polydipsia or unexpected weight changes        Social History     Socioeconomic History    Marital status:    Tobacco Use    Smoking status: Former     Types: Cigarettes     Quit date: 1979     Years since quittin.1    Smokeless tobacco: Never   Vaping Use    Vaping Use: Never used   Substance and Sexual Activity    Alcohol use:  Yes     Alcohol/week: 7.0 standard drinks     Types: 7 Glasses of wine per week    Drug use: No     Family History   Problem Relation Age of Onset    Heart Disease Mother     Other Mother         kidney stones    Diabetes Father     Cancer Sister colon cancer    Heart Disease Brother     Diabetes Brother     Other Brother         kidney stones    Colon Cancer Brother     Heart Disease Brother     Other Brother         kidney stones    Heart Disease Brother     Heart Disease Brother     Cancer Brother         melanoma       OBJECTIVE:     Visit Vitals  /79 (BP 1 Location: Left upper arm, BP Patient Position: Sitting, BP Cuff Size: Adult)   Pulse 96   Temp 97.9 °F (36.6 °C) (Oral)   Resp 18   Ht 4' 11\" (1.499 m)   Wt 119 lb (54 kg)   SpO2 97%   BMI 24.04 kg/m²     CONSTITUTIONAL:   well nourished, appears age appropriate  EYES: sclera anicteric, PERRL, EOMI  ENMT:nares clear, moist mucous membranes, pharynx clear  NECK: supple. Thyroid normal, No JVD or bruits  RESPIRATORY: Chest: clear to ascultation and percussion, normal inspiratory effort  CARDIOVASCULAR: Heart: regular rate and rhythm no murmurs, rubs or gallops, PMI not displaced, No thrills, no peripheral edema  GASTROINTESTINAL: Abdomen: non distended, soft, non tender with very minimal discomfort to palpation, bowel sounds normal  HEMATOLOGIC: no purpura, petechiae or bruising  LYMPHATIC: No lymph node enlargemant  MUSCULOSKELETAL: Extremities: no active synovitis, pulse 1+   INTEGUMENT: No unusual rashes or suspicious skin lesions noted. Nails appear normal.  PERIPHERAL VASCULAR: normal pulses femoral, PT and DP  NEUROLOGIC: non-focal exam, A & O X 3  PSYCHIATRIC:, appropriate affect     ASSESSMENT:   1. Acute colitis    2. Gastroesophageal reflux disease, unspecified whether esophagitis present    3. Irritable bowel syndrome with diarrhea    4. MIAN (generalized anxiety disorder)    5. Depression, unspecified depression type    6. Iron deficiency anemia, unspecified iron deficiency anemia type      Impression  1. Acute colitis clinically resolved  2.   GERD that seems to be stable  3 IBS seems to be stable  4 generalized anxiety have asked her to go ahead and resume the Lexapro which should help with anxiety as well as her depression  5. Depression as noted resume Lexapro  6 iron deficiency anemia reviewed last numbers and they were stable  7. She is having some itching and nighttime which could be anxiety related have asked her to take Benadryl 25 at bedtime and see if that will resolve the problem  8. Joint aches and pains I think related to the Levaquin and should go away with time  At this point I will see her myself again per previous schedule or sooner if there are problems. PLAN:  . No orders of the defined types were placed in this encounter. ATTENTION:   This medical record was transcribed using an electronic medical records system. Although proofread, it may and can contain electronic and spelling errors. Other human spelling and other errors may be present. Corrections may be executed at a later time. Please feel free to contact us for any clarifications as needed. Follow-up and Dispositions    Return in about 6 weeks (around 3/28/2023). No results found for any visits on 02/14/23. Yamileth Zavala MD    The patient verbalized understanding of the problems and plans as explained.

## 2023-02-14 NOTE — PROGRESS NOTES
Physician Progress Note      PATIENT:               Ariela Portillo  CSN #:                  [id number]  :                       1940  ADMIT DATE:       2023 12:29 PM  [address] [place] DATE:        2023 1:22 PM  RESPONDING  PROVIDER #:        Fanta Mclean MD          QUERY TEXT:    Pt admitted  -  with abdominal pain and profuse non bloody diarrhea, noted to have diffuse colitis on CT scan. Please document the type of colitis in the medical record and on the discharge on summary. The medical record reflects the following:    Risk Factors: per ED pn: \"[facility], diagnosed with colitis, which was shown on the CT. She was placed on Cipro and Flagyl, but has had no improvement of symptoms since then. \"    Clinical Indicators:   KULWINDER Peguero NP GI pn: Rule out infectious etiology - stool studies have already been ordered  Agree with excellent note. Pt almost certainly with acute infectious colitis. Distribution not c/w ischemic colitis. Agree with IVF, empiric Abx and will await C diff. (Signed By: Robert Villanueva MD ? 2023 )     Stool studies negative (C. Dif and enteric panel). Stool WBC 5-10     GI pn CT abd/pel W contrast 23 : Diffuse colitis. Diffuse colonic wall thickening suggest strongly a nonspecific colitis. Would recommend when d/c'ed to finish 10-14 day course of empiric abx. Opt follow up with Dr. Esperanza Givens    Treatment: GI consult, CT abdomen, C. Dif and enteric panel, Flagyl 500 mg IV qh8 - and then switched to Flagyl 500 mg po q8h. Levaquain 750 mg IV qd from - then switched to 500 mg po qd  on     Thank you,  Navid Brush RN, CDI  Options provided:  -- Bacterial Colitis  -- Infectious Colitis with prophylactic antibiotic use  -- Colitis due to other etiology, Please document other etiology.   -- Other - I will add my own diagnosis  -- Disagree - Not applicable / Not valid  -- Disagree - Clinically unable to determine / Unknown  -- Refer to Clinical Documentation Reviewer    PROVIDER RESPONSE TEXT:    This patient has bacterial colitis.     Query created by: Jessica Luna on 2/14/2023 12:14 PM      Electronically signed by:  Pedro Luis Senior MD 2/14/2023 5:08 PM

## 2023-02-28 NOTE — PROGRESS NOTES
Form Completion       HPI:     June B Bre Chen is a 78y.o. year old female who is here for her annual comprehensive healthcare exam and completion of her medical forms to enter into assisted living at [facility]. She is followed by me regularly for GERD, IBS, DJD, generalized anxiety, hypothyroidism, history of depression and recent hospitalization for acute colitis. She currently denies any chest pain, shortness of breath, palpitations, PND, orthopnea or other cardiac or respiratory complaints. She notes no current GI or  complaints. Her energy level is improving daily since her hospitalization. She notes no headaches, dizziness or neurologic complaints. She has no current active arthritic complaints and there are no other complaints noted on complete review of systems.              Visit Vitals  /60   Pulse 76   Temp 98 °F (36.7 °C) (Oral)   Resp 18   Ht 4' 11\" (1.499 m)   Wt 123 lb 3.2 oz (55.9 kg)   SpO2 97%   BMI 24.88 kg/m²       Past Medical History:   Diagnosis Date    Acute recurrent pansinusitis 2/23/2018    Annual physical exam 8/3/2017    Anxiety 8/3/2017    Arthralgia 8/3/2017    Arthritis     Arthritis of right hip 8/3/2017    Arthritis, hip 8/3/2017    Autoimmune disease ([address]     psoriasis    Avascular necrosis of femoral head ([address] left 8/3/2017    Bone loss 8/3/2017    Cancer ([address]     endometrial    Chronic eczematous otitis externa of both ears 8/24/2017    Contusion of left knee, initial encounter 8/3/2017    Degenerative arthritis of lumbar spine 8/3/2017    Depression     Elevated liver function tests 8/3/2017    MIAN (generalized anxiety disorder) 8/3/2017    GERD (gastroesophageal reflux disease)     Glaucoma     Glaucoma 8/3/2017    Headache, acute 8/3/2017    Herpes zoster without complication 34/09/1129    History of colonoscopy with polypectomy 8/3/2017    History of endometrial cancer 8/3/2017    Hyperlipidemia 8/3/2017    Hypothyroid 8/3/2017    IBS (irritable bowel syndrome) 8/3/2017    AMPARO (iron deficiency anemia) 8/3/2017    Insomnia 8/3/2017    Labral tear of hip, degenerative 10/24/2017    Long-term use of immunosuppressant medication 8/3/2017    Medication side effect 8/3/2017    Melena 8/3/2017    Monilial vaginitis 8/24/2017    Muscle spasm 8/3/2017    Osteoporosis 8/3/2017    Other ill-defined conditions(799.89)     high cholesterol    Other ill-defined conditions(799.89)     psoriasis    Post-menopausal 8/3/2017    Psoriasis 8/3/2017    Pyuria, sterile 8/3/2017    Rash 8/3/2017    Rosacea blepharoconjunctivitis 8/3/2017    Sciatica 8/3/2017    Somatic dysfunction of cervical region 8/3/2017    Spondylosis of cervical region without myelopathy or radiculopathy 8/3/2017    Thrush 8/3/2017    Thyroid disease     Urticaria 8/3/2017       Past Surgical History:   Procedure Laterality Date    COLONOSCOPY N/A 6/12/2019    COLONOSCOPY performed by Navid Pinto MD at [facility]  6/12/2019         COLORECTAL SCRN; HI RISK IND  4/2/2014         HX APPENDECTOMY      HX GYN      hysterectomy    HX HEENT      bilateral ear    HX HEENT      LASIK    HX ORTHOPAEDIC  1-2011    lumbar laminectomy    HX OTHER SURGICAL Bilateral     cataract extraction with lens implant    HX TONSILLECTOMY      [place] UNLISTED NEUROLOGICAL/NEUROMUSCULAR DX PX  2011    spinal fusion       Prior to Admission medications    Medication Sig Start Date End Date Taking? Authorizing Provider   dicyclomine (BENTYL) 10 mg capsule TAKE 1 CAPSULE BY MOUTH 4 TIMES A DAY 2/5/23  Yes Edilberto Jiménez MD   Cetirizine (ZyrTEC) 10 mg cap Take  by mouth. Yes Provider, Historical   pantoprazole (PROTONIX) 40 mg tablet TAKE 1 TABLET BY MOUTH DAILY 11/3/22  Yes Que Abernathy MD   acetaminophen (TYLENOL) 500 mg tablet Take 500 mg by mouth every six (6) hours as needed for Pain.    Yes Provider, Historical   folic acid (FOLVITE) 1 mg tablet TAKE 2 TABLETS BY MOUTH DAILY 10/18/22  Yes Lina Malik MD   levothyroxine (SYNTHROID) 88 mcg tablet TAKE 1 TABLET BY MOUTH DAILY 21  Yes Bhavna Aguilera, NP   LORazepam (Ativan) 0.5 mg tablet Take 1 Tablet by mouth nightly as needed (sleep). Max Daily Amount: 0.5 mg. 21  Yes Bryon Aguilera, NP   dorzolamide-timoloL (COSOPT) 22.3-6.8 mg/mL ophthalmic solution Cosopt 22.3 mg-6.8 mg/mL eye drops   Prescribed by non 204/085 Marion Del Rosario MD 17  Yes Provider, Historical   travoprost (TRAVATAN Z) 0.004 % ophthalmic solution Administer 1 Drop to both eyes nightly. Yes Provider, Historical        Allergies   Allergen Reactions    Cephalosporins Shortness of Breath    Codeine Nausea and Vomiting    Pcn [Penicillins] Rash        Family History   Problem Relation Age of Onset    Heart Disease Mother     Other Mother         kidney stones    Diabetes Father     Cancer Sister         colon cancer    Heart Disease Brother     Diabetes Brother     Other Brother         kidney stones    Colon Cancer Brother     Heart Disease Brother     Other Brother         kidney stones    Heart Disease Brother     Heart Disease Brother     Cancer Brother         melanoma       Social History     Socioeconomic History    Marital status:      Spouse name: Not on file    Number of children: Not on file    Years of education: Not on file    Highest education level: Not on file   Occupational History    Not on file   Tobacco Use    Smoking status: Former     Types: Cigarettes     Quit date: 1979     Years since quittin.1    Smokeless tobacco: Never   Vaping Use    Vaping Use: Never used   Substance and Sexual Activity    Alcohol use:  Yes     Alcohol/week: 7.0 standard drinks     Types: 7 Glasses of wine per week    Drug use: No    Sexual activity: Not on file   Other Topics Concern    Not on file   Social History Narrative    Not on file     Social Determinants of Health     Financial Resource Strain: Not on file   Food Insecurity: Not on file   Transportation Needs: Not on file   Physical Activity: Not on file   Stress: Not on file   Social Connections: Not on file   Intimate Partner Violence: Not on file   Housing Stability: Not on file        ROS:     Constitutional: She denies fevers, weight loss, sweats. Eyes: No blurred or double vision. ENT: No difficulty with swallowing, taste, speech or smell. NECK: no stiffness swelling or lymph node enlargement  Respiratory: No cough wheezing or shortness of breath. Cardiovascular: Denies chest pain, palpitations, unexplained indigestion or syncope. Breast: She has noted no masses or lumps and no discharge or axillary swelling  Gastrointestinal:  No changes in bowel movements, no abdominal pain, no bloating. Genitourinary: No discharge or abnormal bleeding or pain  Extremities: No joint pain, stiffness or swelling. Neurological:  No numbness, tingling, burring paresthesias or loss of motor strength. No syncope, dizziness or frequent headache  Skin:  No recent rashes or mole changes. Psychiatric/Behavioral:  Negative for depression. The patient is not nervous/anxious. HEMATOLOGIC: no easy bruising or bleeding gums  Endocrine: no sweats of urinary frequency or excessive thirst      Physical Examination:     Vitals:    03/01/23 1422 03/01/23 1505   BP: 101/64 114/60   Pulse: 76    Resp: 18    Temp: 98 °F (36.7 °C)    TempSrc: Oral    SpO2: 97%    Weight: 123 lb 3.2 oz (55.9 kg)    Height: 4' 11\" (1.499 m)    PainSc:   5    PainLoc: Back         General appearance - alert, well appearing, and in no distress  Mental status - alert, oriented to person, place, and time  HEENT:  Ears - bilateral TM's and external ear canals clear  Eyes - pupillary responses were normal.  Extraocular muscle function intact. Lids and conjunctiva not injected. Fundoscopic exam revealed sharp disc margins. eye movements intact  Pharynx- clear with teeth in good repair.   No masses were noted  Neck - supple without thyromegaly or burit. No JVD noted  Lungs - clear to auscultation and percussion  Cardiac- normal rate, regular rhythm without murmurs. PMI not displaced. No gallop, rub or click  Breast: deferred to GYN  Abdomen - flat, soft, non-tender without palpable organomegaly or mass. No pulsatile mass was felt, and not bruit was heard. Bowel sounds were active   Female - deferred to GYN  Rectal - deferred to GYN  Extremities -  no clubbing cyanosis or edema  Lymphatics - no palpable lymphadenopathy, no hepatosplenomegaly  Peripheral vascular - Dorsalis pedis and posterior tibial pulses felt without difficulty  Skin - no rash or unusual mole change noted  Neurological - Cranial nerves II-XII grossly intact. Motor strength 5/5. DTR's 2+ and symmetric. Station and gait normal  Back exam - full range of motion, no tenderness, palpable spasm or pain on motion  Musculoskeletal - no joint tenderness, deformity or swelling  Hematologic: no purpura, petechiae or bruising    Assessment/Plan:     1. Dyslipidemia    2. Gastroesophageal reflux disease, unspecified whether esophagitis present    3. Irritable bowel syndrome with diarrhea    4. Primary osteoarthritis involving multiple joints    5. Acquired hypothyroidism    6. MIAN (generalized anxiety disorder)        Impression  1. Dyslipidemia good on last check  2. GERD stable  3. IBS controlled  4. DJD that is stable  5. Hypothyroidism good on last check  6. Generalized anxiety stable  7. Recent hospitalization for acute colitis energy level improving  Forms completed and tired today for COVID and would sentry. 35 minutes spent in direct care of this patient today with greater than 50% in counseling and coordination of care. Follow-up with me as previously scheduled for other medical problems. No lab test today. No indication for TB testing on exam.    No orders of the defined types were placed in this encounter.        No results found for any visits on 03/01/23. I have reviewed the patient's medical history in detail and updated the computerized patient record. We had a prolonged discussion about these complex clinical issues and went over the various important aspects to consider. All questions were answered. Advised her to call back or return to office if symptoms do not improve, change in nature, or persist.    She was given an after visit summary or informed of Risk I/O Access which includes patient instructions, diagnoses, current medications, & vitals. She expressed understanding with the diagnosis and plan.       Nicanor Davis MD

## 2023-03-01 ENCOUNTER — OFFICE VISIT (OUTPATIENT)
Dept: INTERNAL MEDICINE CLINIC | Age: 83
End: 2023-03-01
Payer: MEDICARE

## 2023-03-01 VITALS
HEART RATE: 76 BPM | TEMPERATURE: 98 F | HEIGHT: 59 IN | WEIGHT: 123.2 LBS | DIASTOLIC BLOOD PRESSURE: 60 MMHG | OXYGEN SATURATION: 97 % | BODY MASS INDEX: 24.84 KG/M2 | SYSTOLIC BLOOD PRESSURE: 114 MMHG | RESPIRATION RATE: 18 BRPM

## 2023-03-01 DIAGNOSIS — K21.9 GASTROESOPHAGEAL REFLUX DISEASE, UNSPECIFIED WHETHER ESOPHAGITIS PRESENT: ICD-10-CM

## 2023-03-01 DIAGNOSIS — K58.0 IRRITABLE BOWEL SYNDROME WITH DIARRHEA: ICD-10-CM

## 2023-03-01 DIAGNOSIS — E78.5 DYSLIPIDEMIA: Primary | ICD-10-CM

## 2023-03-01 DIAGNOSIS — E03.9 ACQUIRED HYPOTHYROIDISM: ICD-10-CM

## 2023-03-01 DIAGNOSIS — M15.9 PRIMARY OSTEOARTHRITIS INVOLVING MULTIPLE JOINTS: ICD-10-CM

## 2023-03-01 DIAGNOSIS — F41.1 GAD (GENERALIZED ANXIETY DISORDER): ICD-10-CM

## 2023-03-01 PROCEDURE — G9717 DOC PT DX DEP/BP F/U NT REQ: HCPCS | Performed by: INTERNAL MEDICINE

## 2023-03-01 PROCEDURE — 99214 OFFICE O/P EST MOD 30 MIN: CPT | Performed by: INTERNAL MEDICINE

## 2023-03-01 PROCEDURE — G8536 NO DOC ELDER MAL SCRN: HCPCS | Performed by: INTERNAL MEDICINE

## 2023-03-01 PROCEDURE — 1123F ACP DISCUSS/DSCN MKR DOCD: CPT | Performed by: INTERNAL MEDICINE

## 2023-03-01 PROCEDURE — 1101F PT FALLS ASSESS-DOCD LE1/YR: CPT | Performed by: INTERNAL MEDICINE

## 2023-03-01 PROCEDURE — 1090F PRES/ABSN URINE INCON ASSESS: CPT | Performed by: INTERNAL MEDICINE

## 2023-03-01 PROCEDURE — G8427 DOCREV CUR MEDS BY ELIG CLIN: HCPCS | Performed by: INTERNAL MEDICINE

## 2023-03-01 PROCEDURE — G8420 CALC BMI NORM PARAMETERS: HCPCS | Performed by: INTERNAL MEDICINE

## 2023-03-01 PROCEDURE — 1111F DSCHRG MED/CURRENT MED MERGE: CPT | Performed by: INTERNAL MEDICINE

## 2023-03-01 PROCEDURE — G8399 PT W/DXA RESULTS DOCUMENT: HCPCS | Performed by: INTERNAL MEDICINE

## 2023-03-01 NOTE — PROGRESS NOTES
Verified Name and  of the patient. Chief Complaint   Patient presents with    Form Completion       Health maintenance will be addressed with Primary Care Provider at next visit. Vitals:    23 1422   BP: 101/64   Pulse: 76   Resp: 18   Temp: 98 °F (36.7 °C)   TempSrc: Oral   SpO2: 97%   Weight: 123 lb 3.2 oz (55.9 kg)   Height: 4' 11\" (1.499 m)   PainSc:   5   PainLoc: Back       1. Have you been to the ER, urgent care clinic since your last visit? Hospitalized since your last visit? No    2. Have you seen or consulted any other health care providers outside of the [address] [place] [address] since your last visit? Include any pap smears or colon screening.  No

## 2023-03-23 ENCOUNTER — OFFICE VISIT (OUTPATIENT)
Dept: INTERNAL MEDICINE CLINIC | Age: 83
End: 2023-03-23
Payer: MEDICARE

## 2023-03-23 VITALS
HEIGHT: 59 IN | TEMPERATURE: 97.9 F | SYSTOLIC BLOOD PRESSURE: 120 MMHG | BODY MASS INDEX: 24.39 KG/M2 | DIASTOLIC BLOOD PRESSURE: 62 MMHG | RESPIRATION RATE: 18 BRPM | HEART RATE: 73 BPM | WEIGHT: 121 LBS

## 2023-03-23 DIAGNOSIS — K52.9 ACUTE COLITIS: Primary | ICD-10-CM

## 2023-03-23 PROCEDURE — G8427 DOCREV CUR MEDS BY ELIG CLIN: HCPCS | Performed by: PHYSICIAN ASSISTANT

## 2023-03-23 PROCEDURE — 1101F PT FALLS ASSESS-DOCD LE1/YR: CPT | Performed by: PHYSICIAN ASSISTANT

## 2023-03-23 PROCEDURE — G9717 DOC PT DX DEP/BP F/U NT REQ: HCPCS | Performed by: PHYSICIAN ASSISTANT

## 2023-03-23 PROCEDURE — G8399 PT W/DXA RESULTS DOCUMENT: HCPCS | Performed by: PHYSICIAN ASSISTANT

## 2023-03-23 PROCEDURE — 1123F ACP DISCUSS/DSCN MKR DOCD: CPT | Performed by: PHYSICIAN ASSISTANT

## 2023-03-23 PROCEDURE — G8420 CALC BMI NORM PARAMETERS: HCPCS | Performed by: PHYSICIAN ASSISTANT

## 2023-03-23 PROCEDURE — 99214 OFFICE O/P EST MOD 30 MIN: CPT | Performed by: PHYSICIAN ASSISTANT

## 2023-03-23 PROCEDURE — G8536 NO DOC ELDER MAL SCRN: HCPCS | Performed by: PHYSICIAN ASSISTANT

## 2023-03-23 PROCEDURE — 1090F PRES/ABSN URINE INCON ASSESS: CPT | Performed by: PHYSICIAN ASSISTANT

## 2023-03-23 RX ORDER — LEVOFLOXACIN 500 MG/1
500 TABLET, FILM COATED ORAL DAILY
Qty: 7 TABLET | Refills: 0 | Status: SHIPPED | OUTPATIENT
Start: 2023-03-23 | End: 2023-03-30

## 2023-03-23 RX ORDER — DIPHENOXYLATE HYDROCHLORIDE AND ATROPINE SULFATE 2.5; .025 MG/1; MG/1
TABLET ORAL
COMMUNITY

## 2023-03-23 RX ORDER — ESCITALOPRAM OXALATE 20 MG/1
20 TABLET ORAL DAILY
COMMUNITY

## 2023-03-23 RX ORDER — METRONIDAZOLE 500 MG/1
500 TABLET ORAL 3 TIMES DAILY
Qty: 21 TABLET | Refills: 0 | Status: SHIPPED | OUTPATIENT
Start: 2023-03-23 | End: 2023-03-30

## 2023-03-23 NOTE — PROGRESS NOTES
HPI:  78 y.o.  presents for acute visit due to a return of diarrhea x 1 wk. PCP is Dr Mir Pfeiffer and previously unknown to me. Has had recent colitis, was hospitalized 1/31/2023 - 2/05/2023  Treated with IV Levaquin and Flagyl, discharged on po regimen of same  CT scan of the abdomen and pelvis showed diffuse colitis. Stool studies for Cdiff, enteric bacteria, and WBC's were negative  No leukocytosis    She reports she was feeling back to her usual self by mid February, and was feeling well at her last visits with Dr Mir Pfeiffer on 2/14/23 and 3/1/23.     Current symptoms started the night of 3/18/23  She celebrated [place] Day on 3/18/23, had 2 glasses of wine, and corned beef and cabbage, cake  Diarrhea began that night when she got home,  She has been taking lomotil QID  She is having watery stools \"constantly\", she reports every time she stands up, moves, etc.  Has decreased appetite, feels full quickly  No N/V  No fevers  No abdominal pain like she had prior to her hospital admission  Her mouth has been dry and saliva has been thick  No dizziness  Normal urination, no urinary sxs, urine has been clear, she has been drinking a lot of water  She has been eating small meals, such as eggs, yogurt, slice of pizza    She reports chronic itching that has preceded the diarrhea, but wonders if related  The itching feels worse when she otherwise does not feel well or is stressed  Is preparing to move    She was prescribed dicyclomine QID on hospital discharge, was taking it PRN, approximately BID but stopped it with current symptoms wondering if it was making her worse    She saw her usual GI, Dr Rubi Ceballos in hospital  She has h/o IBS with diarrhea, fam hx of colon cancer  Did not have colonoscopy for current episode    Last colonoscopy 6/12/2019 by Dr Rubi Ceballos showed normal colonic mucosa throughout diverticulosis,  Mild in degree, involving the sigmoid hemorrhoids internal, Moderate in size, -No repeat screening colonoscopy indicated due to age.   Biopsy ruled out microcolitis        Patient Active Problem List    Diagnosis    Acute colitis    Vitamin D deficiency    Primary osteoarthritis involving multiple joints    Depression    Epigastric pain    GERD (gastroesophageal reflux disease)    Dyslipidemia    DJD (degenerative joint disease) of cervical spine    Alcohol screening    Acquired hypothyroidism    IBS (irritable bowel syndrome)    Long-term use of immunosuppressant medication    Iron deficiency anemia    MIAN (generalized anxiety disorder)    Rosacea blepharoconjunctivitis    Insomnia    Psoriasis         Past Medical History:   Diagnosis Date    Acute recurrent pansinusitis 2/23/2018    Annual physical exam 8/3/2017    Anxiety 8/3/2017    Arthralgia 8/3/2017    Arthritis     Arthritis of right hip 8/3/2017    Arthritis, hip 8/3/2017    Autoimmune disease ([address]     psoriasis    Avascular necrosis of femoral head ([facility] [address] left 8/3/2017    Bone loss 8/3/2017    Cancer ([facility] [address]     endometrial    Chronic eczematous otitis externa of both ears 8/24/2017    Contusion of left knee, initial encounter 8/3/2017    Degenerative arthritis of lumbar spine 8/3/2017    Depression     Elevated liver function tests 8/3/2017    MIAN (generalized anxiety disorder) 8/3/2017    GERD (gastroesophageal reflux disease)     Glaucoma     Glaucoma 8/3/2017    Headache, acute 8/3/2017    Herpes zoster without complication 07/96/7970    History of colonoscopy with polypectomy 8/3/2017    History of endometrial cancer 8/3/2017    Hyperlipidemia 8/3/2017    Hypothyroid 8/3/2017    IBS (irritable bowel syndrome) 8/3/2017    AMPARO (iron deficiency anemia) 8/3/2017    Insomnia 8/3/2017    Labral tear of hip, degenerative 10/24/2017    Long-term use of immunosuppressant medication 8/3/2017    Medication side effect 8/3/2017    Melena 8/3/2017    Monilial vaginitis 8/24/2017    Muscle spasm 8/3/2017    Osteoporosis 8/3/2017    Other ill-defined conditions(799.89)     high cholesterol    Other ill-defined conditions(799.89)     psoriasis    Post-menopausal 8/3/2017    Psoriasis 8/3/2017    Pyuria, sterile 8/3/2017    Rash 8/3/2017    Rosacea blepharoconjunctivitis 8/3/2017    Sciatica 8/3/2017    Somatic dysfunction of cervical region 8/3/2017    Spondylosis of cervical region without myelopathy or radiculopathy 8/3/2017    Thrush 8/3/2017    Thyroid disease     Urticaria 8/3/2017       Social History     Tobacco Use    Smoking status: Former     Types: Cigarettes     Quit date: 1979     Years since quittin.2    Smokeless tobacco: Never   Vaping Use    Vaping Use: Never used   Substance Use Topics    Alcohol use: Yes     Alcohol/week: 7.0 standard drinks     Types: 7 Glasses of wine per week    Drug use: No       Outpatient Medications Marked as Taking for the 3/23/23 encounter (Office Visit) with Francesca Richards PA-C   Medication Sig Dispense Refill    escitalopram oxalate (LEXAPRO) 20 mg tablet Take 20 mg by mouth daily. diphenoxylate-atropine (LomotiL) 2.5-0.025 mg per tablet Take  by mouth four (4) times daily as needed for Diarrhea. Cetirizine (ZyrTEC) 10 mg cap Take  by mouth.      pantoprazole (PROTONIX) 40 mg tablet TAKE 1 TABLET BY MOUTH DAILY 90 Tablet 3    acetaminophen (TYLENOL) 500 mg tablet Take 500 mg by mouth every six (6) hours as needed for Pain. folic acid (FOLVITE) 1 mg tablet TAKE 2 TABLETS BY MOUTH DAILY 180 Tablet 2    levothyroxine (SYNTHROID) 88 mcg tablet TAKE 1 TABLET BY MOUTH DAILY 90 Tablet 3    LORazepam (Ativan) 0.5 mg tablet Take 1 Tablet by mouth nightly as needed (sleep). Max Daily Amount: 0.5 mg. 30 Tablet 0    dorzolamide-timoloL (COSOPT) 22.3-6.8 mg/mL ophthalmic solution Cosopt 22.3 mg-6.8 mg/mL eye drops   Prescribed by non [facility] MD      travoprost (TRAVATAN Z) 0.004 % ophthalmic solution Administer 1 Drop to both eyes nightly.          Allergies   Allergen Reactions    Cephalosporins Shortness of Breath    Codeine Nausea and Vomiting    Pcn [Penicillins] Rash       ROS:  ROS negative except as per HPI. PE:  Visit Vitals  /62 (BP 1 Location: Left upper arm, BP Patient Position: Sitting, BP Cuff Size: Adult)   Pulse 73   Temp 97.9 °F (36.6 °C) (Oral)   Resp 18   Ht 4' 11\" (1.499 m)   Wt 121 lb (54.9 kg)   BMI 24.44 kg/m²     Gen: alert, oriented, no acute distress  Head: normocephalic, atraumatic  Eyes: pupils equal round reactive to light, sclera clear, conjunctiva clear  Oral: moist mucus membranes, no oral lesions, no pharyngeal inflammation or exudate  Neck: supple  Resp: no increase work of breathing, lungs clear to ausculation bilaterally, no wheezing, rales or rhonchi  CV: S1, S2 normal.  No murmurs, rubs, or gallops. Abd: soft, (+)mild diffuse tenderness, no rebound, guarding, or rigidity, not distended. No hepatosplenomegaly. Normal bowel sounds. Neuro: grossly intact  Skin: no lesion or rash  Extremities: no cyanosis or edema    No results found for this visit on 03/23/23. Lab Results   Component Value Date/Time    WBC 8.4 02/07/2023 11:45 AM    HGB (POC) 13.3 08/23/2018 08:20 AM    HGB 13.1 02/07/2023 11:45 AM    HCT (POC) 39.6 08/23/2018 08:20 AM    HCT 40.0 02/07/2023 11:45 AM    PLATELET 811 54/55/6108 11:45 AM    MCV 92.8 02/07/2023 11:45 AM     Lab Results   Component Value Date/Time    Sodium 141 02/07/2023 11:45 AM    Potassium 4.1 02/07/2023 11:45 AM    Chloride 109 (H) 02/07/2023 11:45 AM    CO2 24 02/07/2023 11:45 AM    Anion gap 8 02/07/2023 11:45 AM    Glucose 68 02/07/2023 11:45 AM    BUN 10 02/07/2023 11:45 AM    Creatinine 0.95 02/07/2023 11:45 AM    BUN/Creatinine ratio 11 (L) 02/07/2023 11:45 AM    GFR est AA >60 08/11/2021 11:01 AM    GFR est non-AA 58 (L) 08/11/2021 11:01 AM    Calcium 9.4 02/07/2023 11:45 AM    Bilirubin, total 0.4 01/31/2023 11:51 AM    Alk.  phosphatase 148 (H) 01/31/2023 11:51 AM    Protein, total 6.3 (L) 01/31/2023 11:51 AM    Albumin 2.8 (L) 01/31/2023 11:51 AM    Globulin 3.5 01/31/2023 11:51 AM    A-G Ratio 0.8 (L) 01/31/2023 11:51 AM    ALT (SGPT) 24 01/31/2023 11:51 AM    AST (SGOT) 29 01/31/2023 11:51 AM       Assessment/Plan:      ICD-10-CM ICD-9-CM    1. Acute colitis  K52.9 558.9 levoFLOXacin (LEVAQUIN) 500 mg tablet      metroNIDAZOLE (FLAGYL) 500 mg tablet      CBC WITH AUTOMATED DIFF      METABOLIC PANEL, COMPREHENSIVE      REFERRAL TO GASTROENTEROLOGY      METABOLIC PANEL, COMPREHENSIVE      CBC WITH AUTOMATED DIFF          Recurrent diarrhea suggestive of recurrent colitis  Of note, she went to the bathroom twice during her time in the office  Recently hospitalized for same with CT showing diffuse colitis  Will treat again with levaquin and flagyl  Check CBC, CMP  I would like her to follow up with her GI, Dr Zakiya Tinsley, who had seen her in the hospital recently, due to her persistent symptoms  Bowel rest with clear liquids x 48 hours, then slowly advance BRAT diet  Call sooner if worsening or not responding to treatment as she has in the past  She will call to schedule with Dr Zakiya Tinsley since she is an existing patient and let us know if she has scheduling difficulties    Health Maintenance reviewed - deferred. Orders Placed This Encounter    CBC WITH AUTOMATED DIFF     Standing Status:   Future     Number of Occurrences:   1     Standing Expiration Date:   4/69/1747    METABOLIC PANEL, COMPREHENSIVE     Standing Status:   Future     Number of Occurrences:   1     Standing Expiration Date:   3/23/2024    Zakiya Tinsley [facility]     Referral Priority:   Routine     Referral Type:   Consultation     Referral Reason:   Specialty Services Required     Referred to Provider:   Carol Roman MD     Number of Visits Requested:   1    levoFLOXacin (LEVAQUIN) 500 mg tablet     Sig: Take 1 Tablet by mouth daily for 7 days.      Dispense:  7 Tablet     Refill:  0    metroNIDAZOLE (FLAGYL) 500 mg tablet     Sig: Take 1 Tablet by mouth three (3) times daily for 7 days. Dispense:  21 Tablet     Refill:  0       There are no discontinued medications. Current Outpatient Medications   Medication Sig Dispense Refill    escitalopram oxalate (LEXAPRO) 20 mg tablet Take 20 mg by mouth daily. diphenoxylate-atropine (LomotiL) 2.5-0.025 mg per tablet Take  by mouth four (4) times daily as needed for Diarrhea.      levoFLOXacin (LEVAQUIN) 500 mg tablet Take 1 Tablet by mouth daily for 7 days. 7 Tablet 0    metroNIDAZOLE (FLAGYL) 500 mg tablet Take 1 Tablet by mouth three (3) times daily for 7 days. 21 Tablet 0    Cetirizine (ZyrTEC) 10 mg cap Take  by mouth.      pantoprazole (PROTONIX) 40 mg tablet TAKE 1 TABLET BY MOUTH DAILY 90 Tablet 3    acetaminophen (TYLENOL) 500 mg tablet Take 500 mg by mouth every six (6) hours as needed for Pain. folic acid (FOLVITE) 1 mg tablet TAKE 2 TABLETS BY MOUTH DAILY 180 Tablet 2    levothyroxine (SYNTHROID) 88 mcg tablet TAKE 1 TABLET BY MOUTH DAILY 90 Tablet 3    LORazepam (Ativan) 0.5 mg tablet Take 1 Tablet by mouth nightly as needed (sleep). Max Daily Amount: 0.5 mg. 30 Tablet 0    dorzolamide-timoloL (COSOPT) 22.3-6.8 mg/mL ophthalmic solution Cosopt 22.3 mg-6.8 mg/mL eye drops   Prescribed by non [facility] MD      travoprost (TRAVATAN Z) 0.004 % ophthalmic solution Administer 1 Drop to both eyes nightly. dicyclomine (BENTYL) 10 mg capsule TAKE 1 CAPSULE BY MOUTH 4 TIMES A DAY (Patient not taking: Reported on 3/23/2023) 120 Capsule 3       Recommended healthy diet low in carbohydrates, fats, sodium and cholesterol. Recommended regular cardiovascular exercise 3-6 times per week for 30-60 minutes daily. Verbal and written instructions (see AVS) provided. Patient expresses understanding of diagnosis and treatment plan.       Future Appointments   Date Time Provider Neri Cole   4/26/2023  9:00 AM MD GUILLERMO Locke BS AMB

## 2023-03-23 NOTE — PATIENT INSTRUCTIONS
Clear liquids for 48 hours. Then start saltines. Bananas  Rice  Applesauce  Toast    No greasy, fried, spicy, acidic, or dairy products. The only dairy for the next 5 days should be PLAIN [place] yogurt, one serving daily. Consider probiotic, Florastor, 1 pill twice daily for 5 days.

## 2023-03-23 NOTE — PROGRESS NOTES
Room: f1  Identified pt with two pt identifiers(name and ). Reviewed record in preparation for visit and have obtained necessary documentation. Chief Complaint   Patient presents with    GI Problem     Diarrhea x1 week        Vitals:    / 0803   BP: 120/62   Pulse: 73   Resp: 18   Temp: 97.9 °F (36.6 °C)   TempSrc: Oral   Weight: 121 lb (54.9 kg)   Height: 4' 11\" (1.499 m)   PainSc:   2   PainLoc: Abdomen       Health Maintenance Due   Topic    Shingles Vaccine (2 of 2)       1. \"Have you been to the ER, urgent care clinic since your last visit? Hospitalized since your last visit? \" No    2. \"Have you seen or consulted any other health care providers outside of the [address] [place] [address] since your last visit? \" No     3. For patients over 45: Has the patient had a colonoscopy? NA - based on age     If the patient is female:    4. For patients over 36: Has the patient had a mammogram? NA - based on age    11. For patients over 21: Has the patient had a pap smear?  NA - based on age    Current Outpatient Medications   Medication Instructions    acetaminophen (TYLENOL) 500 mg, Oral, EVERY 6 HOURS AS NEEDED    Cetirizine (ZyrTEC) 10 mg cap Oral    dicyclomine (BENTYL) 10 mg capsule TAKE 1 CAPSULE BY MOUTH 4 TIMES A DAY    dorzolamide-timoloL (COSOPT) 22.3-6.8 mg/mL ophthalmic solution Cosopt 22.3 mg-6.8 mg/mL eye drops   Prescribed by non [facility] MD    escitalopram oxalate (LEXAPRO) 20 mg, Oral, DAILY    folic acid (FOLVITE) 1 mg tablet TAKE 2 TABLETS BY MOUTH DAILY    levothyroxine (SYNTHROID) 88 mcg tablet TAKE 1 TABLET BY MOUTH DAILY    LORazepam (ATIVAN) 0.5 mg, Oral, BEDTIME PRN    pantoprazole (PROTONIX) 40 mg tablet TAKE 1 TABLET BY MOUTH DAILY    travoprost (TRAVATAN Z) 0.004 % ophthalmic solution 1 Drop, EVERY BEDTIME       Allergies   Allergen Reactions    Cephalosporins Shortness of Breath    Codeine Nausea and Vomiting    Pcn [Penicillins] Rash       Immunization History   Administered Date(s) Administered    COVID-19, MODERNA BLUE border, Primary or Immunocompromised, (age 18y+), IM, 100 mcg/0.5mL 02/09/2021, 03/21/2021    COVID-19, MODERNA Bivalent BOOSTER, (age 18y+), IM, 50 mcg/0.5 mL 10/03/2022    Influenza High Dose Vaccine PF 10/09/2017, 09/15/2020, 10/13/2021, 10/11/2022    Influenza Vaccine 09/01/2013, 09/01/2014, 10/01/2015, 10/01/2016    Influenza Vaccine (Tri) Adjuvanted (>65 Yrs FLUAD TRI [id number]) 10/09/2018, 10/14/2019    Influenza Vaccine Whole 10/05/2011    Pneumococcal Conjugate (PCV-13) 03/01/2015    Pneumococcal Polysaccharide (PPSV-23) 11/01/2003, 09/01/2009    Tdap 01/09/2014    Zoster Recombinant 05/16/2019       Past Medical History:   Diagnosis Date    Acute recurrent pansinusitis 2/23/2018    Annual physical exam 8/3/2017    Anxiety 8/3/2017    Arthralgia 8/3/2017    Arthritis     Arthritis of right hip 8/3/2017    Arthritis, hip 8/3/2017    Autoimmune disease ([address]     psoriasis    Avascular necrosis of femoral head ([address] left 8/3/2017    Bone loss 8/3/2017    Cancer ([facility] [address]     endometrial    Chronic eczematous otitis externa of both ears 8/24/2017    Contusion of left knee, initial encounter 8/3/2017    Degenerative arthritis of lumbar spine 8/3/2017    Depression     Elevated liver function tests 8/3/2017    MIAN (generalized anxiety disorder) 8/3/2017    GERD (gastroesophageal reflux disease)     Glaucoma     Glaucoma 8/3/2017    Headache, acute 8/3/2017    Herpes zoster without complication 60/44/5660    History of colonoscopy with polypectomy 8/3/2017    History of endometrial cancer 8/3/2017    Hyperlipidemia 8/3/2017    Hypothyroid 8/3/2017    IBS (irritable bowel syndrome) 8/3/2017    AMPARO (iron deficiency anemia) 8/3/2017    Insomnia 8/3/2017    Labral tear of hip, degenerative 10/24/2017    Long-term use of immunosuppressant medication 8/3/2017    Medication side effect 8/3/2017    Melena 8/3/2017    Monilial vaginitis 8/24/2017    Muscle spasm 8/3/2017    Osteoporosis 8/3/2017    Other ill-defined conditions(799.89)     high cholesterol    Other ill-defined conditions(799.89)     psoriasis    Post-menopausal 8/3/2017    Psoriasis 8/3/2017    Pyuria, sterile 8/3/2017    Rash 8/3/2017    Rosacea blepharoconjunctivitis 8/3/2017    Sciatica 8/3/2017    Somatic dysfunction of cervical region 8/3/2017    Spondylosis of cervical region without myelopathy or radiculopathy 8/3/2017    Thrush 8/3/2017    Thyroid disease     Urticaria 8/3/2017

## 2023-03-24 LAB
ALBUMIN SERPL-MCNC: 3.8 G/DL (ref 3.5–5)
ALBUMIN/GLOB SERPL: 1.5 (ref 1.1–2.2)
ALP SERPL-CCNC: 67 U/L (ref 45–117)
ALT SERPL-CCNC: 17 U/L (ref 12–78)
ANION GAP SERPL CALC-SCNC: 4 MMOL/L (ref 5–15)
AST SERPL-CCNC: 18 U/L (ref 15–37)
BASOPHILS # BLD: 0 K/UL (ref 0–0.1)
BASOPHILS NFR BLD: 1 % (ref 0–1)
BILIRUB SERPL-MCNC: 0.4 MG/DL (ref 0.2–1)
BUN SERPL-MCNC: 16 MG/DL (ref 6–20)
BUN/CREAT SERPL: 20 (ref 12–20)
CALCIUM SERPL-MCNC: 9.9 MG/DL (ref 8.5–10.1)
CHLORIDE SERPL-SCNC: 111 MMOL/L (ref 97–108)
CO2 SERPL-SCNC: 26 MMOL/L (ref 21–32)
CREAT SERPL-MCNC: 0.81 MG/DL (ref 0.55–1.02)
DIFFERENTIAL METHOD BLD: ABNORMAL
EOSINOPHIL # BLD: 0.3 K/UL (ref 0–0.4)
EOSINOPHIL NFR BLD: 8 % (ref 0–7)
ERYTHROCYTE [DISTWIDTH] IN BLOOD BY AUTOMATED COUNT: 15.1 % (ref 11.5–14.5)
GLOBULIN SER CALC-MCNC: 2.5 G/DL (ref 2–4)
GLUCOSE SERPL-MCNC: 90 MG/DL (ref 65–100)
HCT VFR BLD AUTO: 40.3 % (ref 35–47)
HGB BLD-MCNC: 12.4 G/DL (ref 11.5–16)
IMM GRANULOCYTES # BLD AUTO: 0 K/UL (ref 0–0.04)
IMM GRANULOCYTES NFR BLD AUTO: 0 % (ref 0–0.5)
LYMPHOCYTES # BLD: 1.4 K/UL (ref 0.8–3.5)
LYMPHOCYTES NFR BLD: 33 % (ref 12–49)
MCH RBC QN AUTO: 29.6 PG (ref 26–34)
MCHC RBC AUTO-ENTMCNC: 30.8 G/DL (ref 30–36.5)
MCV RBC AUTO: 96.2 FL (ref 80–99)
MONOCYTES # BLD: 0.4 K/UL (ref 0–1)
MONOCYTES NFR BLD: 10 % (ref 5–13)
NEUTS SEG # BLD: 2.1 K/UL (ref 1.8–8)
NEUTS SEG NFR BLD: 48 % (ref 32–75)
NRBC # BLD: 0 K/UL (ref 0–0.01)
NRBC BLD-RTO: 0 PER 100 WBC
PLATELET # BLD AUTO: 232 K/UL (ref 150–400)
PMV BLD AUTO: 10.6 FL (ref 8.9–12.9)
POTASSIUM SERPL-SCNC: 4.5 MMOL/L (ref 3.5–5.1)
PROT SERPL-MCNC: 6.3 G/DL (ref 6.4–8.2)
RBC # BLD AUTO: 4.19 M/UL (ref 3.8–5.2)
SODIUM SERPL-SCNC: 141 MMOL/L (ref 136–145)
WBC # BLD AUTO: 4.4 K/UL (ref 3.6–11)

## 2023-03-28 ENCOUNTER — DOCUMENTATION ONLY (OUTPATIENT)
Dept: INTERNAL MEDICINE CLINIC | Age: 83
End: 2023-03-28

## 2023-04-03 DIAGNOSIS — R19.7 DIARRHEA, UNSPECIFIED TYPE: Primary | ICD-10-CM

## 2023-04-03 RX ORDER — DIPHENOXYLATE HYDROCHLORIDE AND ATROPINE SULFATE 2.5; .025 MG/1; MG/1
TABLET ORAL
Qty: 40 TABLET | Refills: 0 | Status: SHIPPED | OUTPATIENT
Start: 2023-04-03

## 2023-04-04 ENCOUNTER — TELEPHONE (OUTPATIENT)
Dept: INTERNAL MEDICINE CLINIC | Age: 83
End: 2023-04-04

## 2023-04-04 NOTE — TELEPHONE ENCOUNTER
Patient's  called regarding pitting edema in patient's legs that is radiating upward. Advised to take her to the ER for further evaluation.

## 2023-04-12 PROBLEM — N39.0 ACUTE URINARY TRACT INFECTION: Status: ACTIVE | Noted: 2023-04-12

## 2023-04-25 NOTE — PROGRESS NOTES
Chief Complaint   Patient presents with    Irritable Bowel Syndrome     6 month f/up    GERD    Hypothyroidism       SUBJECTIVE:    June B Tony Lu is a 78 y.o. female who returns in follow-up for medical problems include hypertension, hyperlipidemia, GERD, hypothyroidism, DJD, recent proctocolitis and other medical problems. She is taking her medication trying to follow her diet remains physically active. Currently she denies any chest pain, shortness of breath or cardiac respiratory complaints. She notes no GI or  complaints. She has no headaches, dizziness or neurologic complaints. She is taking her medicines on a regular basis and she claims to be eating well. She is due for colonoscopy and has that scheduled. Current Outpatient Medications   Medication Sig Dispense Refill    escitalopram oxalate (LEXAPRO) 20 mg tablet Take 1 Tablet by mouth daily. dicyclomine (BENTYL) 10 mg capsule TAKE 1 CAPSULE BY MOUTH 4 TIMES A DAY (Patient taking differently: 1 Capsule. TAKE 1 CAPSULE BY MOUTH 2 TIMES A DAY) 120 Capsule 3    Cetirizine (ZyrTEC) 10 mg cap Take  by mouth.      pantoprazole (PROTONIX) 40 mg tablet TAKE 1 TABLET BY MOUTH DAILY 90 Tablet 3    acetaminophen (TYLENOL) 500 mg tablet Take 1 Tablet by mouth every six (6) hours as needed for Pain. folic acid (FOLVITE) 1 mg tablet TAKE 2 TABLETS BY MOUTH DAILY 180 Tablet 2    levothyroxine (SYNTHROID) 88 mcg tablet TAKE 1 TABLET BY MOUTH DAILY 90 Tablet 3    LORazepam (Ativan) 0.5 mg tablet Take 1 Tablet by mouth nightly as needed (sleep). Max Daily Amount: 0.5 mg. 30 Tablet 0    dorzolamide-timoloL (COSOPT) 22.3-6.8 mg/mL ophthalmic solution Cosopt 22.3 mg-6.8 mg/mL eye drops   Prescribed by non [facility] MD      travoprost (TRAVATAN Z) 0.004 % ophthalmic solution Administer 1 Drop to both eyes nightly.        Past Medical History:   Diagnosis Date    Acute recurrent pansinusitis 2/23/2018    Annual physical exam 8/3/2017    Anxiety 8/3/2017 Arthralgia 8/3/2017    Arthritis     Arthritis of right hip 8/3/2017    Arthritis, hip 8/3/2017    Autoimmune disease ([facility] [address]     psoriasis    Avascular necrosis of femoral head ([facility] [address] left 8/3/2017    Bone loss 8/3/2017    Cancer ([facility] [address]     endometrial    Chronic eczematous otitis externa of both ears 8/24/2017    Contusion of left knee, initial encounter 8/3/2017    Degenerative arthritis of lumbar spine 8/3/2017    Depression     Elevated liver function tests 8/3/2017    MIAN (generalized anxiety disorder) 8/3/2017    GERD (gastroesophageal reflux disease)     Glaucoma     Glaucoma 8/3/2017    Headache, acute 8/3/2017    Herpes zoster without complication 07/18/6850    History of colonoscopy with polypectomy 8/3/2017    History of endometrial cancer 8/3/2017    Hyperlipidemia 8/3/2017    Hypothyroid 8/3/2017    IBS (irritable bowel syndrome) 8/3/2017    AMPARO (iron deficiency anemia) 8/3/2017    Insomnia 8/3/2017    Labral tear of hip, degenerative 10/24/2017    Long-term use of immunosuppressant medication 8/3/2017    Medication side effect 8/3/2017    Melena 8/3/2017    Monilial vaginitis 8/24/2017    Muscle spasm 8/3/2017    Osteoporosis 8/3/2017    Other ill-defined conditions(799.89)     high cholesterol    Other ill-defined conditions(799.89)     psoriasis    Post-menopausal 8/3/2017    Psoriasis 8/3/2017    Pyuria, sterile 8/3/2017    Rash 8/3/2017    Rosacea blepharoconjunctivitis 8/3/2017    Sciatica 8/3/2017    Somatic dysfunction of cervical region 8/3/2017    Spondylosis of cervical region without myelopathy or radiculopathy 8/3/2017    Thrush 8/3/2017    Thyroid disease     Urticaria 8/3/2017     Past Surgical History:   Procedure Laterality Date    COLONOSCOPY N/A 6/12/2019    COLONOSCOPY performed by Pearl Alvarado MD at [address]  6/12/2019         COLORECTAL SCRN; HI RISK IND  4/2/2014         HX APPENDECTOMY      HX GYN      hysterectomy    HX HEENT bilateral ear    HX HEENT      LASIK    HX ORTHOPAEDIC  -    lumbar laminectomy    HX OTHER SURGICAL Bilateral     cataract extraction with lens implant    HX TONSILLECTOMY      [facility] UNLISTED NEUROLOGICAL/NEUROMUSCULAR DX PX  2011    spinal fusion     Allergies   Allergen Reactions    Cephalosporins Shortness of Breath    Codeine Nausea and Vomiting    Pcn [Penicillins] Rash       REVIEW OF SYSTEMS:  General: negative for - chills or fever, or weight loss or gain  ENT: negative for - headaches, nasal congestion or tinnitus  Eyes: no blurred or visual changes  Neck: No stiffness or swollen nodes  Respiratory: negative for - cough, hemoptysis, shortness of breath or wheezing  Cardiovascular : negative for - chest pain, edema, palpitations or shortness of breath  Gastrointestinal: negative for - abdominal pain, blood in stools, heartburn or nausea/vomiting  Genito-Urinary: no dysuria, trouble voiding, or hematuria  Musculoskeletal: negative for - gait disturbance, joint pain, joint stiffness or joint swelling  Neurological: no TIA or stroke symptoms  Hematologic: no bruises, no bleeding  Lymphatic: no swollen glands  Integument: no lumps, mole changes, nail changes or rash  Endocrine:no malaise/lethargy poly uria or polydipsia or unexpected weight changes        Social History     Socioeconomic History    Marital status:    Tobacco Use    Smoking status: Former     Types: Cigarettes     Quit date: 1979     Years since quittin.3    Smokeless tobacco: Never   Vaping Use    Vaping Use: Never used   Substance and Sexual Activity    Alcohol use:  Yes     Alcohol/week: 7.0 standard drinks     Types: 7 Glasses of wine per week    Drug use: No     Social Determinants of Health     Financial Resource Strain: Low Risk     Difficulty of Paying Living Expenses: Not hard at all   Food Insecurity: No Food Insecurity    Worried About Running Out of Food in the Last Year: Never true    Ran Out of Food in the Last Year: Never true     Family History   Problem Relation Age of Onset    Heart Disease Mother     Other Mother         kidney stones    Diabetes Father     Cancer Sister         colon cancer    Heart Disease Brother     Diabetes Brother     Other Brother         kidney stones    Colon Cancer Brother     Heart Disease Brother     Other Brother         kidney stones    Heart Disease Brother     Heart Disease Brother     Cancer Brother         melanoma       OBJECTIVE:     Visit Vitals  /80 (BP 1 Location: Left arm, BP Patient Position: Sitting, BP Cuff Size: Adult)   Pulse 93   Temp 98.4 °F (36.9 °C) (Oral)   Resp 18   Ht 4' 11\" (1.499 m)   Wt 116 lb 11.2 oz (52.9 kg)   SpO2 96%   BMI 23.57 kg/m²     CONSTITUTIONAL:   well nourished, appears age appropriate  EYES: sclera anicteric, PERRL, EOMI  ENMT:nares clear, moist mucous membranes, pharynx clear  NECK: supple. Thyroid normal, No JVD or bruits  RESPIRATORY: Chest: clear to ascultation and percussion, normal inspiratory effort  CARDIOVASCULAR: Heart: regular rate and rhythm no murmurs, rubs or gallops, PMI not displaced, No thrills, no peripheral edema  GASTROINTESTINAL: Abdomen: non distended, soft, non tender, bowel sounds normal  HEMATOLOGIC: no purpura, petechiae or bruising  LYMPHATIC: No lymph node enlargemant  MUSCULOSKELETAL: Extremities: no active synovitis, pulse 1+   INTEGUMENT: No unusual rashes or suspicious skin lesions noted. Nails appear normal.  PERIPHERAL VASCULAR: normal pulses femoral, PT and DP  NEUROLOGIC: non-focal exam, A & O X 3  PSYCHIATRIC:, appropriate affect     ASSESSMENT:   1. Dyslipidemia    2. Gastroesophageal reflux disease, unspecified whether esophagitis present    3. Primary osteoarthritis involving multiple joints    4. Irritable bowel syndrome with diarrhea    5. Acquired hypothyroidism    6. MIAN (generalized anxiety disorder)    7. Iron deficiency anemia, unspecified iron deficiency anemia type      Impression  1. Dyslipidemia prior lab reviewed repeat status pending  2. GERD stable  3. DJD stable  4. IBS seems to be controlled  5. Hypothyroidism stable last check  6. Generalized anxiety controlled  7. Iron deficiency anemia repeat CBC pending  Provided all is stable with the lab and she remained stable I will see her in 6 months but I will see her sooner should there be a problem. I will call with today's lab results. PLAN:  .  Orders Placed This Encounter    METABOLIC PANEL, COMPREHENSIVE    LIPID PANEL    CBC WITH AUTOMATED DIFF         ATTENTION:   This medical record was transcribed using an electronic medical records system. Although proofread, it may and can contain electronic and spelling errors. Other human spelling and other errors may be present. Corrections may be executed at a later time. Please feel free to contact us for any clarifications as needed. Follow-up and Dispositions    Return in about 6 months (around 10/26/2023). No results found for any visits on 04/26/23. Beverly Barrera MD    The patient verbalized understanding of the problems and plans as explained.

## 2023-04-26 ENCOUNTER — OFFICE VISIT (OUTPATIENT)
Dept: INTERNAL MEDICINE CLINIC | Age: 83
End: 2023-04-26
Payer: MEDICARE

## 2023-04-26 VITALS
HEIGHT: 59 IN | WEIGHT: 116.7 LBS | TEMPERATURE: 98.4 F | OXYGEN SATURATION: 96 % | DIASTOLIC BLOOD PRESSURE: 80 MMHG | RESPIRATION RATE: 18 BRPM | HEART RATE: 93 BPM | SYSTOLIC BLOOD PRESSURE: 110 MMHG | BODY MASS INDEX: 23.52 KG/M2

## 2023-04-26 DIAGNOSIS — M15.9 PRIMARY OSTEOARTHRITIS INVOLVING MULTIPLE JOINTS: ICD-10-CM

## 2023-04-26 DIAGNOSIS — F41.1 GAD (GENERALIZED ANXIETY DISORDER): ICD-10-CM

## 2023-04-26 DIAGNOSIS — D50.9 IRON DEFICIENCY ANEMIA, UNSPECIFIED IRON DEFICIENCY ANEMIA TYPE: ICD-10-CM

## 2023-04-26 DIAGNOSIS — K58.0 IRRITABLE BOWEL SYNDROME WITH DIARRHEA: ICD-10-CM

## 2023-04-26 DIAGNOSIS — E78.5 DYSLIPIDEMIA: Primary | ICD-10-CM

## 2023-04-26 DIAGNOSIS — E03.9 ACQUIRED HYPOTHYROIDISM: ICD-10-CM

## 2023-04-26 DIAGNOSIS — K21.9 GASTROESOPHAGEAL REFLUX DISEASE, UNSPECIFIED WHETHER ESOPHAGITIS PRESENT: ICD-10-CM

## 2023-04-26 PROCEDURE — 1123F ACP DISCUSS/DSCN MKR DOCD: CPT | Performed by: INTERNAL MEDICINE

## 2023-04-26 PROCEDURE — G8420 CALC BMI NORM PARAMETERS: HCPCS | Performed by: INTERNAL MEDICINE

## 2023-04-26 PROCEDURE — 99214 OFFICE O/P EST MOD 30 MIN: CPT | Performed by: INTERNAL MEDICINE

## 2023-04-26 PROCEDURE — G8399 PT W/DXA RESULTS DOCUMENT: HCPCS | Performed by: INTERNAL MEDICINE

## 2023-04-26 PROCEDURE — G8536 NO DOC ELDER MAL SCRN: HCPCS | Performed by: INTERNAL MEDICINE

## 2023-04-26 PROCEDURE — G8427 DOCREV CUR MEDS BY ELIG CLIN: HCPCS | Performed by: INTERNAL MEDICINE

## 2023-04-26 PROCEDURE — 1101F PT FALLS ASSESS-DOCD LE1/YR: CPT | Performed by: INTERNAL MEDICINE

## 2023-04-26 PROCEDURE — G9717 DOC PT DX DEP/BP F/U NT REQ: HCPCS | Performed by: INTERNAL MEDICINE

## 2023-04-26 PROCEDURE — 1090F PRES/ABSN URINE INCON ASSESS: CPT | Performed by: INTERNAL MEDICINE

## 2023-04-26 NOTE — PROGRESS NOTES
Chief Complaint   Patient presents with    Irritable Bowel Syndrome     6 month f/up    GERD    Hypothyroidism    1. Have you been to the ER, urgent care clinic since your last visit? Hospitalized since your last visit? No    2. Have you seen or consulted any other health care providers outside of the [address] [place] [address] since your last visit? Include any pap smears or colon screening.  No

## 2023-04-27 LAB
ALBUMIN SERPL-MCNC: 3.9 G/DL (ref 3.5–5)
ALBUMIN/GLOB SERPL: 1.4 (ref 1.1–2.2)
ALP SERPL-CCNC: 66 U/L (ref 45–117)
ALT SERPL-CCNC: 19 U/L (ref 12–78)
ANION GAP SERPL CALC-SCNC: 3 MMOL/L (ref 5–15)
AST SERPL-CCNC: 23 U/L (ref 15–37)
BASOPHILS # BLD: 0.1 K/UL (ref 0–0.1)
BASOPHILS NFR BLD: 2 % (ref 0–1)
BILIRUB SERPL-MCNC: 0.4 MG/DL (ref 0.2–1)
BUN SERPL-MCNC: 19 MG/DL (ref 6–20)
BUN/CREAT SERPL: 24 (ref 12–20)
CALCIUM SERPL-MCNC: 10.2 MG/DL (ref 8.5–10.1)
CHLORIDE SERPL-SCNC: 111 MMOL/L (ref 97–108)
CHOLEST SERPL-MCNC: 243 MG/DL
CO2 SERPL-SCNC: 26 MMOL/L (ref 21–32)
CREAT SERPL-MCNC: 0.8 MG/DL (ref 0.55–1.02)
DIFFERENTIAL METHOD BLD: ABNORMAL
EOSINOPHIL # BLD: 0.2 K/UL (ref 0–0.4)
EOSINOPHIL NFR BLD: 6 % (ref 0–7)
ERYTHROCYTE [DISTWIDTH] IN BLOOD BY AUTOMATED COUNT: 14.4 % (ref 11.5–14.5)
GLOBULIN SER CALC-MCNC: 2.8 G/DL (ref 2–4)
GLUCOSE SERPL-MCNC: 105 MG/DL (ref 65–100)
HCT VFR BLD AUTO: 43.3 % (ref 35–47)
HDLC SERPL-MCNC: 88 MG/DL
HDLC SERPL: 2.8 (ref 0–5)
HGB BLD-MCNC: 13.5 G/DL (ref 11.5–16)
IMM GRANULOCYTES # BLD AUTO: 0 K/UL (ref 0–0.04)
IMM GRANULOCYTES NFR BLD AUTO: 0 % (ref 0–0.5)
LDLC SERPL CALC-MCNC: 137.6 MG/DL (ref 0–100)
LYMPHOCYTES # BLD: 1.1 K/UL (ref 0.8–3.5)
LYMPHOCYTES NFR BLD: 31 % (ref 12–49)
MCH RBC QN AUTO: 29.2 PG (ref 26–34)
MCHC RBC AUTO-ENTMCNC: 31.2 G/DL (ref 30–36.5)
MCV RBC AUTO: 93.5 FL (ref 80–99)
MONOCYTES # BLD: 0.3 K/UL (ref 0–1)
MONOCYTES NFR BLD: 9 % (ref 5–13)
NEUTS SEG # BLD: 1.9 K/UL (ref 1.8–8)
NEUTS SEG NFR BLD: 52 % (ref 32–75)
NRBC # BLD: 0 K/UL (ref 0–0.01)
NRBC BLD-RTO: 0 PER 100 WBC
PLATELET # BLD AUTO: 220 K/UL (ref 150–400)
PMV BLD AUTO: 11 FL (ref 8.9–12.9)
POTASSIUM SERPL-SCNC: 4.7 MMOL/L (ref 3.5–5.1)
PROT SERPL-MCNC: 6.7 G/DL (ref 6.4–8.2)
RBC # BLD AUTO: 4.63 M/UL (ref 3.8–5.2)
SODIUM SERPL-SCNC: 140 MMOL/L (ref 136–145)
TRIGL SERPL-MCNC: 87 MG/DL (ref ?–150)
VLDLC SERPL CALC-MCNC: 17.4 MG/DL
WBC # BLD AUTO: 3.6 K/UL (ref 3.6–11)

## 2023-05-31 ENCOUNTER — ANESTHESIA EVENT (OUTPATIENT)
Facility: HOSPITAL | Age: 83
End: 2023-05-31
Payer: MEDICARE

## 2023-05-31 ENCOUNTER — ANESTHESIA (OUTPATIENT)
Facility: HOSPITAL | Age: 83
End: 2023-05-31
Payer: MEDICARE

## 2023-05-31 ENCOUNTER — HOSPITAL ENCOUNTER (OUTPATIENT)
Facility: HOSPITAL | Age: 83
Setting detail: OUTPATIENT SURGERY
Discharge: HOME | End: 2023-05-31
Attending: INTERNAL MEDICINE | Admitting: INTERNAL MEDICINE
Payer: MEDICARE

## 2023-05-31 VITALS
WEIGHT: 116.3 LBS | HEART RATE: 57 BPM | SYSTOLIC BLOOD PRESSURE: 132 MMHG | RESPIRATION RATE: 16 BRPM | BODY MASS INDEX: 23.44 KG/M2 | DIASTOLIC BLOOD PRESSURE: 82 MMHG | OXYGEN SATURATION: 99 % | HEIGHT: 59 IN | TEMPERATURE: 98 F

## 2023-05-31 PROCEDURE — 6360000002 HC RX W HCPCS: Performed by: NURSE ANESTHETIST, CERTIFIED REGISTERED

## 2023-05-31 PROCEDURE — 3700000000 HC ANESTHESIA ATTENDED CARE: Performed by: INTERNAL MEDICINE

## 2023-05-31 PROCEDURE — 88341 IMHCHEM/IMCYTCHM EA ADD ANTB: CPT

## 2023-05-31 PROCEDURE — 3700000001 HC ADD 15 MINUTES (ANESTHESIA): Performed by: INTERNAL MEDICINE

## 2023-05-31 PROCEDURE — 88305 TISSUE EXAM BY PATHOLOGIST: CPT

## 2023-05-31 PROCEDURE — 7100000010 HC PHASE II RECOVERY - FIRST 15 MIN: Performed by: INTERNAL MEDICINE

## 2023-05-31 PROCEDURE — 3600007512: Performed by: INTERNAL MEDICINE

## 2023-05-31 PROCEDURE — 2580000003 HC RX 258: Performed by: NURSE ANESTHETIST, CERTIFIED REGISTERED

## 2023-05-31 PROCEDURE — 3600007502: Performed by: INTERNAL MEDICINE

## 2023-05-31 PROCEDURE — 2709999900 HC NON-CHARGEABLE SUPPLY: Performed by: INTERNAL MEDICINE

## 2023-05-31 PROCEDURE — 2500000003 HC RX 250 WO HCPCS: Performed by: NURSE ANESTHETIST, CERTIFIED REGISTERED

## 2023-05-31 PROCEDURE — 7100000011 HC PHASE II RECOVERY - ADDTL 15 MIN: Performed by: INTERNAL MEDICINE

## 2023-05-31 PROCEDURE — 88342 IMHCHEM/IMCYTCHM 1ST ANTB: CPT

## 2023-05-31 RX ORDER — SODIUM CHLORIDE 9 MG/ML
25 INJECTION, SOLUTION INTRAVENOUS PRN
Status: CANCELLED | OUTPATIENT
Start: 2023-05-31

## 2023-05-31 RX ORDER — 0.9 % SODIUM CHLORIDE 0.9 %
INTRAVENOUS SOLUTION INTRAVENOUS PRN
Status: DISCONTINUED | OUTPATIENT
Start: 2023-05-31 | End: 2023-05-31 | Stop reason: SDUPTHER

## 2023-05-31 RX ORDER — SODIUM CHLORIDE 0.9 % (FLUSH) 0.9 %
5-40 SYRINGE (ML) INJECTION PRN
Status: CANCELLED | OUTPATIENT
Start: 2023-05-31

## 2023-05-31 RX ORDER — LIDOCAINE HYDROCHLORIDE 20 MG/ML
INJECTION, SOLUTION EPIDURAL; INFILTRATION; INTRACAUDAL; PERINEURAL PRN
Status: DISCONTINUED | OUTPATIENT
Start: 2023-05-31 | End: 2023-05-31 | Stop reason: SDUPTHER

## 2023-05-31 RX ORDER — SODIUM CHLORIDE 0.9 % (FLUSH) 0.9 %
5-40 SYRINGE (ML) INJECTION EVERY 12 HOURS SCHEDULED
Status: CANCELLED | OUTPATIENT
Start: 2023-05-31

## 2023-05-31 RX ADMIN — LIDOCAINE HYDROCHLORIDE 60 MG: 20 INJECTION, SOLUTION EPIDURAL; INFILTRATION; INTRACAUDAL; PERINEURAL at 12:49

## 2023-05-31 RX ADMIN — PROPOFOL 25 MG: 10 INJECTION, EMULSION INTRAVENOUS at 12:49

## 2023-05-31 RX ADMIN — PROPOFOL 25 MG: 10 INJECTION, EMULSION INTRAVENOUS at 12:54

## 2023-05-31 RX ADMIN — SODIUM CHLORIDE 500 ML: 900 INJECTION, SOLUTION INTRAVENOUS at 13:09

## 2023-05-31 RX ADMIN — PROPOFOL 25 MG: 10 INJECTION, EMULSION INTRAVENOUS at 13:00

## 2023-05-31 RX ADMIN — PROPOFOL 25 MG: 10 INJECTION, EMULSION INTRAVENOUS at 12:56

## 2023-05-31 RX ADMIN — PROPOFOL 25 MG: 10 INJECTION, EMULSION INTRAVENOUS at 12:48

## 2023-05-31 RX ADMIN — PROPOFOL 25 MG: 10 INJECTION, EMULSION INTRAVENOUS at 12:51

## 2023-05-31 RX ADMIN — PROPOFOL 25 MG: 10 INJECTION, EMULSION INTRAVENOUS at 12:58

## 2023-05-31 ASSESSMENT — PAIN - FUNCTIONAL ASSESSMENT: PAIN_FUNCTIONAL_ASSESSMENT: NONE - DENIES PAIN

## 2023-05-31 NOTE — OP NOTE
[facility]                  Colonoscopy Operative Report    5/31/2023 June B John Gray  [id number]  1940    Procedure Type:   Colonoscopy with biopsy     Indications:    Change in bowel habits, Recent colitis      Pre-operative Diagnosis: see indication above    Post-operative Diagnosis:  See findings below    :  Chaitanya Huff MD    Referring Provider: Karlie Costa MD      Sedation:  MAC anesthesia Propofol    Pre-Procedural Exam:      Airway: clear,  No airway problems anticipated  Heart: RRR, without gallops or rubs  Lungs: clear bilaterally without wheezes, crackles, or rhonchi  Abdomen: soft, nontender, nondistended, bowel sounds present  Mental Status: awake, alert and oriented to person, place and time     Procedure Details:  After informed consent was obtained with all risks and benefits of procedure explained and preoperative exam completed, the patient was taken to the endoscopy suite and placed in the left lateral decubitus position. Upon sequential sedation as per above, a digital rectal exam was performed . The Olympus videocolonoscope  was inserted in the rectum and carefully advanced to the cecum, which was identified by the ileocecal valve and appendiceal orifice. The cecum was identified by the ileocecal valve and appendiceal orifice. The quality of preparation was good. The colonoscope was slowly withdrawn with careful evaluation between folds. Retroflexion in the rectum was completed demonstrating internal hemorrhoids. Findings:   Rectum: Grade 1 internal hemorrhoid(s); Sigmoid:     -Diverticulosis  Descending Colon:     -Diverticulosis. Normal mucosa biopsied  Transverse Colon: normal  Ascending Colon: normal mucosa, biopsied  Cecum: normal  Terminal Ileum: not intubated      Specimen Removed:   1. Biopsies of ascending colon  2 Biopsies of descending colon    Complications: None. EBL:  None.     Impression:    normal

## 2023-05-31 NOTE — DISCHARGE INSTRUCTIONS
care  Limit your activities  Do not drive and operate hazardous machinery  Do not make important personal, legal or business decisions  Do not drink alcoholic beverages  If you have not urinated within 8 hours after discharge, please contact your physician  Resume your medications unless otherwise instructed    For 24 hours after general anesthesia or intravenous analgesia / sedation  you may experience:  Drowsiness, dizziness, sleepiness, or confusion  Difficulty remembering or delayed reaction times  Difficulty with your balance, especially while walking, move slowly and carefully, do not make sudden position changes  Difficulty focusing or blurred vision    You may not be aware of slight changes in your behavior and/or your reaction time because of the medication used during and after your procedure.     Report the following to your physician:  Excessive pain, swelling, redness or odor of or around the surgical area  Temperature over 100.5  Nausea and vomiting lasting longer than 4 hours or if unable to take medications  Any signs of decreased circulation or nerve impairment to extremity: change in color, persistent numbness, tingling, coldness or increase pain  Any questions or concerns    IF YOU REPORT TO AN EMERGENCY ROOM, DOCTOR'S OFFICE OR HOSPITAL WITHIN 24 HOURS AFTER YOUR PROCEDURE, BRING THIS SHEET AND YOUR AFTER VISIT SUMMARY WITH YOU AND GIVE IT TO THE PHYSICIAN OR NURSE ATTENDING YOU.

## 2023-05-31 NOTE — PERIOP NOTE
Endoscopy Case End Note:    1313:  Procedure scope was pre-cleaned, per protocol, at bedside by Meri PEACOCK      1025:  Report received from anesthesia Edinsoncrow Feliz CNA. See anesthesia flowsheet for intra-procedure vital signs and events. 1314:  Hearing aide and glasses returned to patient.

## 2023-05-31 NOTE — ANESTHESIA POSTPROCEDURE EVALUATION
Department of Anesthesiology  Postprocedure Note    Patient: June B Ladarius Davey  MRN: [id number]  YOB: 1940  Date of evaluation: 5/31/2023      Procedure Summary     Date: 05/31/23 Room / Location: \[facility]\"" ENDO 01 / [facility] ENDOSCOPY    Anesthesia Start: 6528 Anesthesia Stop: 1311    Procedure: COLONOSCOPY WITH BIOPSY Diagnosis:       Acute colitis      (Acute colitis)    Surgeons: Claudia Jorgensen MD Responsible Provider: Neal Martínez MD    Anesthesia Type: MAC ASA Status: 2          Anesthesia Type: MAC    Brody Phase I: Brody Score: 10    Brody Phase II:        Anesthesia Post Evaluation    Patient location during evaluation: PACU  Patient participation: complete - patient participated  Level of consciousness: sleepy but conscious and responsive to verbal stimuli  Airway patency: patent  Nausea & Vomiting: no vomiting and no nausea  Complications: no  Cardiovascular status: blood pressure returned to baseline and hemodynamically stable  Respiratory status: acceptable  Hydration status: stable

## 2023-05-31 NOTE — ANESTHESIA PRE PROCEDURE
Department of Anesthesiology  Preprocedure Note       Name:  Luis Koehler   Age:  78 y.o.  :  1940                                          MRN:  [id number]         Date:  2023      Surgeon: Collette All):  Juliet Collet., MD    Procedure: Procedure(s):  COLONOSCOPY WITH BIOPSY    Medications prior to admission:   Prior to Admission medications    Medication Sig Start Date End Date Taking? Authorizing Provider   acetaminophen (TYLENOL) 500 MG tablet Take 500 mg by mouth every 6 hours as needed    Ar Automatic Reconciliation   Cetirizine HCl (ZYRTEC ALLERGY) 10 MG CAPS Take by mouth    Ar Automatic Reconciliation   dorzolamide-timolol (COSOPT) 22.3-6.8 MG/ML ophthalmic solution Cosopt 22.3 mg-6.8 mg/mL eye drops   Prescribed by non [facility] MD 17   Ar Automatic Reconciliation   folic acid (FOLVITE) 1 MG tablet TAKE 2 TABLETS BY MOUTH DAILY 10/18/22   Ar Automatic Reconciliation   levothyroxine (SYNTHROID) 88 MCG tablet TAKE 1 TABLET BY MOUTH DAILY 21   Ar Automatic Reconciliation   LORazepam (ATIVAN) 0.5 MG tablet Take 0.5 mg by mouth. 21   Ar Automatic Reconciliation   pantoprazole (PROTONIX) 40 MG tablet TAKE 1 TABLET BY MOUTH DAILY 11/3/22   Ar Automatic Reconciliation   Travoprost, BAK Free, (TRAVATAN Z) 0.004 % SOLN ophthalmic solution Apply 1 drop to eye    Ar Automatic Reconciliation   dicyclomine (BENTYL) 10 MG capsule TAKE 1 CAPSULE BY MOUTH 4 TIMES A DAY 23   Ar Automatic Reconciliation   levoFLOXacin (LEVAQUIN) 500 MG tablet Take 500 mg by mouth every 24 hours 23   Ar Automatic Reconciliation   metroNIDAZOLE (FLAGYL) 500 MG tablet Take 500 mg by mouth 3 times daily 23   Ar Automatic Reconciliation       Current medications:    No current facility-administered medications for this encounter.      Current Outpatient Medications   Medication Sig Dispense Refill    acetaminophen (TYLENOL) 500 MG tablet Take 500 mg by mouth every 6 hours as needed      Cetirizine

## 2023-05-31 NOTE — H&P
Deny Valle MD  [facility], [address] [id number]  [address]  [phone]  [email or web address]      Gastroenterology Outpatient History and Physical    Patient: Merari B Ladarius Davey    Physician: Riley Shetty MD    Vital Signs: Blood pressure (!) 169/74, pulse 65, resp. rate 15, height 1.499 m (4' 11\"), weight 52.8 kg (116 lb 4.8 oz), SpO2 98 %. Allergies: Allergies   Allergen Reactions    Cephalosporins Shortness Of Breath    Codeine Nausea And Vomiting    Penicillins Rash       Chief Complaint: abd pain and colitis    History of Present Illness: OV NOTE:  The patient is a 78year old female who presents with a complaint of Abdominal Pain. The patient presents for worsening symptoms (Hospitalized at [address] [place] [address] with acute colitis with diarrhea  Jan 31 -Feb 5, 2023 at [id number] [place] [address] with pancolitis---not proven to be what infection. No stool studies ever done  Finished po abx and got better. Then got suddenly worse again after eating corned beef and cabbage  March 18th and 20th had bad diarrhea, then again a few days later more diarrhea  Took lomotil which helped    Eating causing diarrhea. Saw Selena Prince and  Put back on dicyclomine qid and antibiotics again---levaquin and metronidazole for 7 days. And sent to liquid diet. Feels very fatigued. ). The onset of the abdominal pain has been acute. The abdominal pain is described as crampy and  is described as being located in the lower abdomen . The symptoms have been associated with diarrhea,  while the symptoms have not been associated with bloody stools. Previous diagnostic tests have included CT scan, but not colonoscopy (last one done 2019---no polyps.  Random biopsies negative for microscopic colitis)    History:  Past Medical History:   Diagnosis Date    Acute recurrent pansinusitis 2/23/2018    Annual physical exam 8/3/2017    Anxiety 8/3/2017    Arthralgia 8/3/2017    Arthritis     Arthritis

## 2023-07-05 RX ORDER — FOLIC ACID 1 MG/1
TABLET ORAL
Qty: 180 TABLET | Refills: 1 | Status: SHIPPED | OUTPATIENT
Start: 2023-07-05

## 2023-07-05 NOTE — TELEPHONE ENCOUNTER
RX refill request from the patient/pharmacy. Patient last seen 04- with labs, and next appt. scheduled for 10-  Requested Prescriptions     Pending Prescriptions Disp Refills    folic acid (FOLVITE) 1 MG tablet [Pharmacy Med Name: FOLIC ACID 1MG TABLETS] 180 tablet 1     Sig: TAKE 2 TABLETS BY MOUTH DAILY    .

## 2023-07-19 RX ORDER — LEVOTHYROXINE SODIUM 88 UG/1
TABLET ORAL
Qty: 90 TABLET | Refills: 1 | Status: SHIPPED | OUTPATIENT
Start: 2023-07-19

## 2023-07-19 RX ORDER — ESCITALOPRAM OXALATE 20 MG/1
TABLET ORAL
Qty: 90 TABLET | Refills: 1 | Status: SHIPPED | OUTPATIENT
Start: 2023-07-19

## 2023-07-19 NOTE — TELEPHONE ENCOUNTER
RX refill request from the patient/pharmacy. Patient last seen 04- with labs, and next appt. scheduled for 10-  Requested Prescriptions     Pending Prescriptions Disp Refills    escitalopram (LEXAPRO) 20 MG tablet [Pharmacy Med Name: ESCITALOPRAM 20MG TABLETS] 90 tablet 1     Sig: TAKE 1 TABLET BY MOUTH EVERY DAY    levothyroxine (SYNTHROID) 88 MCG tablet [Pharmacy Med Name: LEVOTHYROXINE 0.088MG (88MCG) TAB] 90 tablet 1     Sig: TAKE 1 TABLET BY MOUTH DAILY    .

## 2023-08-23 ENCOUNTER — OFFICE VISIT (OUTPATIENT)
Facility: CLINIC | Age: 83
End: 2023-08-23

## 2023-08-23 VITALS
WEIGHT: 122.6 LBS | OXYGEN SATURATION: 98 % | SYSTOLIC BLOOD PRESSURE: 146 MMHG | BODY MASS INDEX: 24.72 KG/M2 | TEMPERATURE: 97.9 F | HEIGHT: 59 IN | HEART RATE: 55 BPM | RESPIRATION RATE: 18 BRPM | DIASTOLIC BLOOD PRESSURE: 76 MMHG

## 2023-08-23 DIAGNOSIS — J02.9 PHARYNGITIS, UNSPECIFIED ETIOLOGY: ICD-10-CM

## 2023-08-23 DIAGNOSIS — Z87.19 HISTORY OF COLITIS: ICD-10-CM

## 2023-08-23 DIAGNOSIS — R19.4 FREQUENT BOWEL MOVEMENTS: Primary | ICD-10-CM

## 2023-08-23 RX ORDER — NETARSUDIL 0.2 MG/ML
SOLUTION/ DROPS OPHTHALMIC; TOPICAL
COMMUNITY
Start: 2023-07-20

## 2023-08-23 SDOH — ECONOMIC STABILITY: FOOD INSECURITY: WITHIN THE PAST 12 MONTHS, THE FOOD YOU BOUGHT JUST DIDN'T LAST AND YOU DIDN'T HAVE MONEY TO GET MORE.: NEVER TRUE

## 2023-08-23 SDOH — ECONOMIC STABILITY: INCOME INSECURITY: HOW HARD IS IT FOR YOU TO PAY FOR THE VERY BASICS LIKE FOOD, HOUSING, MEDICAL CARE, AND HEATING?: NOT HARD AT ALL

## 2023-08-23 SDOH — ECONOMIC STABILITY: HOUSING INSECURITY
IN THE LAST 12 MONTHS, WAS THERE A TIME WHEN YOU DID NOT HAVE A STEADY PLACE TO SLEEP OR SLEPT IN A SHELTER (INCLUDING NOW)?: NO

## 2023-08-23 SDOH — ECONOMIC STABILITY: FOOD INSECURITY: WITHIN THE PAST 12 MONTHS, YOU WORRIED THAT YOUR FOOD WOULD RUN OUT BEFORE YOU GOT MONEY TO BUY MORE.: NEVER TRUE

## 2023-08-23 NOTE — PROGRESS NOTES
Subjective:   Merari Khan is a 78 y.o. female who complains of frequent BM x 2 wks, gradually increasing, reminds her of her recent colitis    Feels urge to have BM all the time, and every time she eats  Thin, paste-like stools about 6x/day  Is taking immodium once daily and one lomotil daily  Normal urination and no urinary sxs  Mouth feels dry  No N/V  Normal appetite, but decreased taste    Of note, was hospitalized 1/31/2023 - 2/05/2023 for colitis  I saw her for similar sxs in 3/23/23, treated with levaquin and flagyl with benefit and was feeling well since then    She had colonoscopy by Dr Cageary Officer in 5/2023 - biopsy negative for micro-colitis    Her uvula feels swollen and feels a lump when she swallows x 2 wks  Home covid test negative  No cough  No fevers    Last week finished baclofen and prednisone for her neck    She says the GI sxs started while on the prednisone    She is on a daily probiotic since January      Past Medical History:   Diagnosis Date    Acute recurrent pansinusitis 2/23/2018    Annual physical exam 8/3/2017    Anxiety 8/3/2017    Arthralgia 8/3/2017    Arthritis     Arthritis of right hip 8/3/2017    Arthritis, hip 8/3/2017    Autoimmune disease ([address])     psoriasis    Avascular necrosis of femoral head ([address]) 8/3/2017    Bone loss 8/3/2017    Cancer ([address])     endometrial    Chronic eczematous otitis externa of both ears 8/24/2017    Contusion of left knee 8/3/2017    Degenerative arthritis of lumbar spine 8/3/2017    Depression     Elevated liver function tests 8/3/2017    NIMA (generalized anxiety disorder) 8/3/2017    GERD (gastroesophageal reflux disease)     Glaucoma 8/3/2017    Glaucoma     Headache, acute 8/3/2017    Herpes zoster without complication 91/23/7894    History of colonoscopy with polypectomy 8/3/2017    History of endometrial cancer 8/3/2017    Hyperlipidemia 8/3/2017    Hypothyroid 8/3/2017    IBS (irritable bowel syndrome) 8/3/2017    KAUSHAL (iron deficiency

## 2023-08-24 LAB
ALBUMIN SERPL-MCNC: 3.7 G/DL (ref 3.5–5)
ALBUMIN/GLOB SERPL: 1.3 (ref 1.1–2.2)
ALP SERPL-CCNC: 71 U/L (ref 45–117)
ALT SERPL-CCNC: 28 U/L (ref 12–78)
ANION GAP SERPL CALC-SCNC: 3 MMOL/L (ref 5–15)
AST SERPL-CCNC: 22 U/L (ref 15–37)
BASOPHILS # BLD: 0 K/UL (ref 0–0.1)
BASOPHILS NFR BLD: 1 % (ref 0–1)
BILIRUB SERPL-MCNC: 0.4 MG/DL (ref 0.2–1)
BUN SERPL-MCNC: 26 MG/DL (ref 6–20)
BUN/CREAT SERPL: 25 (ref 12–20)
CALCIUM SERPL-MCNC: 9.9 MG/DL (ref 8.5–10.1)
CHLORIDE SERPL-SCNC: 106 MMOL/L (ref 97–108)
CO2 SERPL-SCNC: 30 MMOL/L (ref 21–32)
CREAT SERPL-MCNC: 1.06 MG/DL (ref 0.55–1.02)
DIFFERENTIAL METHOD BLD: ABNORMAL
EOSINOPHIL # BLD: 0.3 K/UL (ref 0–0.4)
EOSINOPHIL NFR BLD: 4 % (ref 0–7)
ERYTHROCYTE [DISTWIDTH] IN BLOOD BY AUTOMATED COUNT: 14.1 % (ref 11.5–14.5)
GLOBULIN SER CALC-MCNC: 2.8 G/DL (ref 2–4)
GLUCOSE SERPL-MCNC: 83 MG/DL (ref 65–100)
HCT VFR BLD AUTO: 42.8 % (ref 35–47)
HGB BLD-MCNC: 13.5 G/DL (ref 11.5–16)
IMM GRANULOCYTES # BLD AUTO: 0.1 K/UL (ref 0–0.04)
IMM GRANULOCYTES NFR BLD AUTO: 1 % (ref 0–0.5)
LYMPHOCYTES # BLD: 1.8 K/UL (ref 0.8–3.5)
LYMPHOCYTES NFR BLD: 28 % (ref 12–49)
MCH RBC QN AUTO: 30.3 PG (ref 26–34)
MCHC RBC AUTO-ENTMCNC: 31.5 G/DL (ref 30–36.5)
MCV RBC AUTO: 96 FL (ref 80–99)
MONOCYTES # BLD: 0.6 K/UL (ref 0–1)
MONOCYTES NFR BLD: 10 % (ref 5–13)
NEUTS SEG # BLD: 3.8 K/UL (ref 1.8–8)
NEUTS SEG NFR BLD: 56 % (ref 32–75)
NRBC # BLD: 0 K/UL (ref 0–0.01)
NRBC BLD-RTO: 0 PER 100 WBC
PLATELET # BLD AUTO: 226 K/UL (ref 150–400)
PMV BLD AUTO: 10.3 FL (ref 8.9–12.9)
POTASSIUM SERPL-SCNC: 5 MMOL/L (ref 3.5–5.1)
PROT SERPL-MCNC: 6.5 G/DL (ref 6.4–8.2)
RBC # BLD AUTO: 4.46 M/UL (ref 3.8–5.2)
SODIUM SERPL-SCNC: 139 MMOL/L (ref 136–145)
WBC # BLD AUTO: 6.6 K/UL (ref 3.6–11)

## 2023-10-04 DIAGNOSIS — R19.7 DIARRHEA, UNSPECIFIED TYPE: Primary | ICD-10-CM

## 2023-10-04 RX ORDER — PANTOPRAZOLE SODIUM 40 MG/1
TABLET, DELAYED RELEASE ORAL
Qty: 90 TABLET | Refills: 1 | Status: SHIPPED | OUTPATIENT
Start: 2023-10-04

## 2023-10-04 NOTE — TELEPHONE ENCOUNTER
RX refill request from the patient/pharmacy. Patient last seen 04- with labs, and next appt. scheduled for 10-  Requested Prescriptions     Pending Prescriptions Disp Refills    pantoprazole (PROTONIX) 40 MG tablet [Pharmacy Med Name: PANTOPRAZOLE 40MG TABLETS] 90 tablet 1     Sig: TAKE 1 TABLET BY MOUTH DAILY    .

## 2023-10-05 RX ORDER — DIPHENOXYLATE HYDROCHLORIDE AND ATROPINE SULFATE 2.5; .025 MG/1; MG/1
TABLET ORAL
Qty: 40 TABLET | Refills: 0 | Status: SHIPPED | OUTPATIENT
Start: 2023-10-05 | End: 2023-10-15

## 2023-10-05 NOTE — TELEPHONE ENCOUNTER
Requested Prescriptions     Pending Prescriptions Disp Refills    diphenoxylate-atropine (LOMOTIL) 2.5-0.025 MG per tablet [Pharmacy Med Name: DIPHENOXYLATE/ATROPINE 2.5MG TABS] 40 tablet 0     Sig: TAKE 1 TABLET BY MOUTH FOUR TIMES DAILY FOR 10 DAYS AS NEEDED       RX refill request from the patient/pharmacy. Patient last seen 8/23/23 with labs, and next appt. scheduled for 10/18/23.

## 2023-10-17 PROBLEM — Z00.00 MEDICARE ANNUAL WELLNESS VISIT, SUBSEQUENT: Status: ACTIVE | Noted: 2022-10-25

## 2023-10-17 PROBLEM — K21.9 GERD (GASTROESOPHAGEAL REFLUX DISEASE): Status: ACTIVE | Noted: 2017-11-27

## 2023-10-17 PROBLEM — K58.9 IBS (IRRITABLE BOWEL SYNDROME): Status: ACTIVE | Noted: 2017-08-03

## 2023-10-17 PROBLEM — E78.5 DYSLIPIDEMIA: Status: ACTIVE | Noted: 2017-08-24

## 2023-10-17 PROBLEM — F41.1 GAD (GENERALIZED ANXIETY DISORDER): Status: ACTIVE | Noted: 2017-08-03

## 2023-10-17 PROBLEM — M15.0 PRIMARY OSTEOARTHRITIS INVOLVING MULTIPLE JOINTS: Status: ACTIVE | Noted: 2022-10-19

## 2023-10-17 PROBLEM — D50.9 IRON DEFICIENCY ANEMIA: Status: ACTIVE | Noted: 2017-08-03

## 2023-10-17 PROBLEM — E03.9 ACQUIRED HYPOTHYROIDISM: Status: ACTIVE | Noted: 2017-08-03

## 2023-10-17 PROBLEM — M47.812 DJD (DEGENERATIVE JOINT DISEASE) OF CERVICAL SPINE: Status: ACTIVE | Noted: 2017-08-03

## 2023-10-17 PROBLEM — L40.9 PSORIASIS: Status: ACTIVE | Noted: 2017-08-03

## 2023-10-17 PROBLEM — M15.9 PRIMARY OSTEOARTHRITIS INVOLVING MULTIPLE JOINTS: Status: ACTIVE | Noted: 2022-10-19

## 2023-10-17 PROBLEM — G47.00 INSOMNIA: Status: ACTIVE | Noted: 2017-08-03

## 2023-10-17 PROBLEM — E55.9 VITAMIN D DEFICIENCY: Status: ACTIVE | Noted: 2022-10-25

## 2023-10-17 PROBLEM — F32.A DEPRESSION: Status: ACTIVE | Noted: 2022-10-19

## 2023-10-18 ENCOUNTER — OFFICE VISIT (OUTPATIENT)
Facility: CLINIC | Age: 83
End: 2023-10-18
Payer: MEDICARE

## 2023-10-18 VITALS
HEART RATE: 60 BPM | RESPIRATION RATE: 16 BRPM | TEMPERATURE: 97.8 F | DIASTOLIC BLOOD PRESSURE: 80 MMHG | HEIGHT: 59 IN | WEIGHT: 122.8 LBS | BODY MASS INDEX: 24.76 KG/M2 | OXYGEN SATURATION: 96 % | SYSTOLIC BLOOD PRESSURE: 130 MMHG

## 2023-10-18 DIAGNOSIS — E55.9 VITAMIN D DEFICIENCY: ICD-10-CM

## 2023-10-18 DIAGNOSIS — Z13.39 ALCOHOL SCREENING: ICD-10-CM

## 2023-10-18 DIAGNOSIS — F41.1 GAD (GENERALIZED ANXIETY DISORDER): ICD-10-CM

## 2023-10-18 DIAGNOSIS — F32.A DEPRESSION, UNSPECIFIED DEPRESSION TYPE: ICD-10-CM

## 2023-10-18 DIAGNOSIS — F51.01 PRIMARY INSOMNIA: ICD-10-CM

## 2023-10-18 DIAGNOSIS — M47.812 SPONDYLOSIS OF CERVICAL REGION WITHOUT MYELOPATHY OR RADICULOPATHY: ICD-10-CM

## 2023-10-18 DIAGNOSIS — L40.9 PSORIASIS: ICD-10-CM

## 2023-10-18 DIAGNOSIS — M15.9 PRIMARY OSTEOARTHRITIS INVOLVING MULTIPLE JOINTS: ICD-10-CM

## 2023-10-18 DIAGNOSIS — E78.5 DYSLIPIDEMIA: Primary | ICD-10-CM

## 2023-10-18 DIAGNOSIS — Z00.00 MEDICARE ANNUAL WELLNESS VISIT, SUBSEQUENT: ICD-10-CM

## 2023-10-18 DIAGNOSIS — K21.9 GASTROESOPHAGEAL REFLUX DISEASE WITHOUT ESOPHAGITIS: ICD-10-CM

## 2023-10-18 DIAGNOSIS — D50.9 IRON DEFICIENCY ANEMIA, UNSPECIFIED IRON DEFICIENCY ANEMIA TYPE: ICD-10-CM

## 2023-10-18 DIAGNOSIS — E03.9 ACQUIRED HYPOTHYROIDISM: ICD-10-CM

## 2023-10-18 DIAGNOSIS — K58.9 IRRITABLE BOWEL SYNDROME, UNSPECIFIED TYPE: ICD-10-CM

## 2023-10-18 PROCEDURE — G8427 DOCREV CUR MEDS BY ELIG CLIN: HCPCS | Performed by: INTERNAL MEDICINE

## 2023-10-18 PROCEDURE — G8484 FLU IMMUNIZE NO ADMIN: HCPCS | Performed by: INTERNAL MEDICINE

## 2023-10-18 PROCEDURE — G0439 PPPS, SUBSEQ VISIT: HCPCS | Performed by: INTERNAL MEDICINE

## 2023-10-18 PROCEDURE — 1090F PRES/ABSN URINE INCON ASSESS: CPT | Performed by: INTERNAL MEDICINE

## 2023-10-18 PROCEDURE — G8400 PT W/DXA NO RESULTS DOC: HCPCS | Performed by: INTERNAL MEDICINE

## 2023-10-18 PROCEDURE — 1123F ACP DISCUSS/DSCN MKR DOCD: CPT | Performed by: INTERNAL MEDICINE

## 2023-10-18 PROCEDURE — G8420 CALC BMI NORM PARAMETERS: HCPCS | Performed by: INTERNAL MEDICINE

## 2023-10-18 PROCEDURE — 99214 OFFICE O/P EST MOD 30 MIN: CPT | Performed by: INTERNAL MEDICINE

## 2023-10-18 PROCEDURE — 1036F TOBACCO NON-USER: CPT | Performed by: INTERNAL MEDICINE

## 2023-10-18 ASSESSMENT — COLUMBIA-SUICIDE SEVERITY RATING SCALE - C-SSRS
4. HAVE YOU HAD THESE THOUGHTS AND HAD SOME INTENTION OF ACTING ON THEM?: NO
5. HAVE YOU STARTED TO WORK OUT OR WORKED OUT THE DETAILS OF HOW TO KILL YOURSELF? DO YOU INTEND TO CARRY OUT THIS PLAN?: NO
7. DID THIS OCCUR IN THE LAST THREE MONTHS: NO
3. HAVE YOU BEEN THINKING ABOUT HOW YOU MIGHT KILL YOURSELF?: NO

## 2023-10-18 ASSESSMENT — LIFESTYLE VARIABLES
HOW OFTEN DURING THE LAST YEAR HAVE YOU BEEN UNABLE TO REMEMBER WHAT HAPPENED THE NIGHT BEFORE BECAUSE YOU HAD BEEN DRINKING: 0
HAVE YOU OR SOMEONE ELSE BEEN INJURED AS A RESULT OF YOUR DRINKING: 0
HOW OFTEN DURING THE LAST YEAR HAVE YOU FAILED TO DO WHAT WAS NORMALLY EXPECTED FROM YOU BECAUSE OF DRINKING: 0
HAS A RELATIVE, FRIEND, DOCTOR, OR ANOTHER HEALTH PROFESSIONAL EXPRESSED CONCERN ABOUT YOUR DRINKING OR SUGGESTED YOU CUT DOWN: 0
HOW MANY STANDARD DRINKS CONTAINING ALCOHOL DO YOU HAVE ON A TYPICAL DAY: 1 OR 2
HOW OFTEN DURING THE LAST YEAR HAVE YOU FOUND THAT YOU WERE NOT ABLE TO STOP DRINKING ONCE YOU HAD STARTED: 0
HOW OFTEN DURING THE LAST YEAR HAVE YOU NEEDED AN ALCOHOLIC DRINK FIRST THING IN THE MORNING TO GET YOURSELF GOING AFTER A NIGHT OF HEAVY DRINKING: 0
HOW OFTEN DURING THE LAST YEAR HAVE YOU HAD A FEELING OF GUILT OR REMORSE AFTER DRINKING: 0
HOW OFTEN DO YOU HAVE A DRINK CONTAINING ALCOHOL: 4 OR MORE TIMES A WEEK

## 2023-10-18 ASSESSMENT — PATIENT HEALTH QUESTIONNAIRE - PHQ9
SUM OF ALL RESPONSES TO PHQ QUESTIONS 1-9: 0
4. FEELING TIRED OR HAVING LITTLE ENERGY: 0
SUM OF ALL RESPONSES TO PHQ QUESTIONS 1-9: 0
SUM OF ALL RESPONSES TO PHQ9 QUESTIONS 1 & 2: 0
SUM OF ALL RESPONSES TO PHQ QUESTIONS 1-9: 0
5. POOR APPETITE OR OVEREATING: 0
2. FEELING DOWN, DEPRESSED OR HOPELESS: 0
1. LITTLE INTEREST OR PLEASURE IN DOING THINGS: 0
10. IF YOU CHECKED OFF ANY PROBLEMS, HOW DIFFICULT HAVE THESE PROBLEMS MADE IT FOR YOU TO DO YOUR WORK, TAKE CARE OF THINGS AT HOME, OR GET ALONG WITH OTHER PEOPLE: 0
7. TROUBLE CONCENTRATING ON THINGS, SUCH AS READING THE NEWSPAPER OR WATCHING TELEVISION: 0
8. MOVING OR SPEAKING SO SLOWLY THAT OTHER PEOPLE COULD HAVE NOTICED. OR THE OPPOSITE, BEING SO FIGETY OR RESTLESS THAT YOU HAVE BEEN MOVING AROUND A LOT MORE THAN USUAL: 0
SUM OF ALL RESPONSES TO PHQ QUESTIONS 1-9: 0
SUM OF ALL RESPONSES TO PHQ QUESTIONS 1-9: 0
9. THOUGHTS THAT YOU WOULD BE BETTER OFF DEAD, OR OF HURTING YOURSELF: 0
SUM OF ALL RESPONSES TO PHQ QUESTIONS 1-9: 0
6. FEELING BAD ABOUT YOURSELF - OR THAT YOU ARE A FAILURE OR HAVE LET YOURSELF OR YOUR FAMILY DOWN: 0
1. LITTLE INTEREST OR PLEASURE IN DOING THINGS: 0
SUM OF ALL RESPONSES TO PHQ QUESTIONS 1-9: 0
3. TROUBLE FALLING OR STAYING ASLEEP: 0
SUM OF ALL RESPONSES TO PHQ QUESTIONS 1-9: 0

## 2023-10-18 NOTE — PROGRESS NOTES
Medicare Annual Wellness Visit    June B Estefania Bunn is here for Medicare AWV    Assessment & Plan   Dyslipidemia  -     TSH; Future  -     T4, Free; Future  -     Lipid Panel; Future  -     CK; Future  -     Comprehensive Metabolic Panel; Future  Gastroesophageal reflux disease without esophagitis  -     CBC with Auto Differential; Future  Primary osteoarthritis involving multiple joints  -     Urinalysis with Microscopic; Future  Spondylosis of cervical region without myelopathy or radiculopathy  Acquired hypothyroidism  NIMA (generalized anxiety disorder)  Depression, unspecified depression type  Irritable bowel syndrome, unspecified type  Primary insomnia  Iron deficiency anemia, unspecified iron deficiency anemia type  Psoriasis  Vitamin D deficiency  -     Vitamin D 25 Hydroxy; Future  Alcohol screening  -     UT ANNUAL ALCOHOL SCREEN 15 MIN; Future  Medicare annual wellness visit, subsequent    ASSESSMENT:   1. Dyslipidemia    2. Gastroesophageal reflux disease without esophagitis    3. Primary osteoarthritis involving multiple joints    4. Spondylosis of cervical region without myelopathy or radiculopathy    5. Acquired hypothyroidism    6. NIMA (generalized anxiety disorder)    7. Depression, unspecified depression type    8. Irritable bowel syndrome, unspecified type    9. Primary insomnia    10. Iron deficiency anemia, unspecified iron deficiency anemia type    11. Psoriasis    12. Vitamin D deficiency    13. Alcohol screening    14. Medicare annual wellness visit, subsequent      Impression  1. Dyslipidemia prior lab reviewed and repeat status pending and we will adjust treatment if necessary. 2.  GERD that is stable  3. DJD that is stable  4 cervical spondylosis seems to be controlled she takes as needed meloxicam she recently had a steroid Dosepak  5. Hypothyroidism repeat status pending  6 generalized anxiety stable  7 depression controlled  8 IBS controlled  9. Insomnia that is under control  10.

## 2023-10-19 LAB
25(OH)D3 SERPL-MCNC: 26.4 NG/ML (ref 30–100)
ALBUMIN SERPL-MCNC: 3.9 G/DL (ref 3.5–5)
ALBUMIN/GLOB SERPL: 1.4 (ref 1.1–2.2)
ALP SERPL-CCNC: 66 U/L (ref 45–117)
ALT SERPL-CCNC: 24 U/L (ref 12–78)
ANION GAP SERPL CALC-SCNC: 3 MMOL/L (ref 5–15)
APPEARANCE UR: CLEAR
AST SERPL-CCNC: 20 U/L (ref 15–37)
BACTERIA URNS QL MICRO: NEGATIVE /HPF
BASOPHILS # BLD: 0.1 K/UL (ref 0–0.1)
BASOPHILS NFR BLD: 1 % (ref 0–1)
BILIRUB SERPL-MCNC: 0.5 MG/DL (ref 0.2–1)
BILIRUB UR QL: NEGATIVE
BUN SERPL-MCNC: 22 MG/DL (ref 6–20)
BUN/CREAT SERPL: 21 (ref 12–20)
CALCIUM SERPL-MCNC: 9.9 MG/DL (ref 8.5–10.1)
CHLORIDE SERPL-SCNC: 111 MMOL/L (ref 97–108)
CHOLEST SERPL-MCNC: 274 MG/DL
CK SERPL-CCNC: 59 U/L (ref 26–192)
CO2 SERPL-SCNC: 28 MMOL/L (ref 21–32)
COLOR UR: ABNORMAL
CREAT SERPL-MCNC: 1.06 MG/DL (ref 0.55–1.02)
DIFFERENTIAL METHOD BLD: NORMAL
EOSINOPHIL # BLD: 0.3 K/UL (ref 0–0.4)
EOSINOPHIL NFR BLD: 6 % (ref 0–7)
EPITH CASTS URNS QL MICRO: ABNORMAL /LPF
ERYTHROCYTE [DISTWIDTH] IN BLOOD BY AUTOMATED COUNT: 13.7 % (ref 11.5–14.5)
GLOBULIN SER CALC-MCNC: 2.7 G/DL (ref 2–4)
GLUCOSE SERPL-MCNC: 87 MG/DL (ref 65–100)
GLUCOSE UR STRIP.AUTO-MCNC: NEGATIVE MG/DL
HCT VFR BLD AUTO: 42.6 % (ref 35–47)
HDLC SERPL-MCNC: 111 MG/DL
HDLC SERPL: 2.5 (ref 0–5)
HGB BLD-MCNC: 13.5 G/DL (ref 11.5–16)
HGB UR QL STRIP: NEGATIVE
IMM GRANULOCYTES # BLD AUTO: 0 K/UL (ref 0–0.04)
IMM GRANULOCYTES NFR BLD AUTO: 0 % (ref 0–0.5)
KETONES UR QL STRIP.AUTO: NEGATIVE MG/DL
LDLC SERPL CALC-MCNC: 135.4 MG/DL (ref 0–100)
LEUKOCYTE ESTERASE UR QL STRIP.AUTO: ABNORMAL
LYMPHOCYTES # BLD: 1.6 K/UL (ref 0.8–3.5)
LYMPHOCYTES NFR BLD: 33 % (ref 12–49)
MCH RBC QN AUTO: 29.7 PG (ref 26–34)
MCHC RBC AUTO-ENTMCNC: 31.7 G/DL (ref 30–36.5)
MCV RBC AUTO: 93.6 FL (ref 80–99)
MONOCYTES # BLD: 0.4 K/UL (ref 0–1)
MONOCYTES NFR BLD: 8 % (ref 5–13)
NEUTS SEG # BLD: 2.5 K/UL (ref 1.8–8)
NEUTS SEG NFR BLD: 52 % (ref 32–75)
NITRITE UR QL STRIP.AUTO: NEGATIVE
NRBC # BLD: 0 K/UL (ref 0–0.01)
NRBC BLD-RTO: 0 PER 100 WBC
PH UR STRIP: 7 (ref 5–8)
PLATELET # BLD AUTO: 212 K/UL (ref 150–400)
PMV BLD AUTO: 10.1 FL (ref 8.9–12.9)
POTASSIUM SERPL-SCNC: 5.1 MMOL/L (ref 3.5–5.1)
PROT SERPL-MCNC: 6.6 G/DL (ref 6.4–8.2)
PROT UR STRIP-MCNC: NEGATIVE MG/DL
RBC # BLD AUTO: 4.55 M/UL (ref 3.8–5.2)
RBC #/AREA URNS HPF: ABNORMAL /HPF (ref 0–5)
SODIUM SERPL-SCNC: 142 MMOL/L (ref 136–145)
SP GR UR REFRACTOMETRY: 1.02 (ref 1–1.03)
T4 FREE SERPL-MCNC: 0.9 NG/DL (ref 0.8–1.5)
TRIGL SERPL-MCNC: 138 MG/DL
TSH SERPL DL<=0.05 MIU/L-ACNC: 3.57 UIU/ML (ref 0.36–3.74)
UROBILINOGEN UR QL STRIP.AUTO: 0.2 EU/DL (ref 0.2–1)
VLDLC SERPL CALC-MCNC: 27.6 MG/DL
WBC # BLD AUTO: 4.7 K/UL (ref 3.6–11)
WBC URNS QL MICRO: ABNORMAL /HPF (ref 0–4)

## 2023-11-16 PROBLEM — Z00.00 MEDICARE ANNUAL WELLNESS VISIT, SUBSEQUENT: Status: RESOLVED | Noted: 2022-10-25 | Resolved: 2023-11-16

## 2024-04-08 RX ORDER — PANTOPRAZOLE SODIUM 40 MG/1
40 TABLET, DELAYED RELEASE ORAL DAILY
Qty: 90 TABLET | Refills: 1 | Status: SHIPPED | OUTPATIENT
Start: 2024-04-08

## 2024-04-08 NOTE — TELEPHONE ENCOUNTER
PCP: John Norman MD    Last appt: 10/18/2023  Future Appointments   Date Time Provider Department Center   4/18/2024  8:50 AM John Norman MD [facility]       Requested Prescriptions     Pending Prescriptions Disp Refills    pantoprazole (PROTONIX) 40 MG tablet 90 tablet 1     Sig: Take 1 tablet by mouth daily       Prior labs and Blood pressures:  BP Readings from Last 3 Encounters:   10/18/23 130/80   08/23/23 (!) 146/76   05/31/23 132/82     Lab Results   Component Value Date/Time     10/18/2023 09:43 AM    K 5.1 10/18/2023 09:43 AM     10/18/2023 09:43 AM    CO2 28 10/18/2023 09:43 AM    BUN 22 10/18/2023 09:43 AM    GFRAA >60 08/11/2021 11:01 AM     No results found for: \"HBA1C\", \"[id number]\"  Lab Results   Component Value Date/Time    CHOL 274 10/18/2023 09:43 AM     10/18/2023 09:43 AM    VLDL 29 06/10/2020 10:54 AM     No results found for: \"VITD3\", \"VD3RIA\"        Lab Results   Component Value Date/Time    TSH 1.00 10/26/2022 11:03 AM

## 2024-04-18 ENCOUNTER — OFFICE VISIT (OUTPATIENT)
Facility: CLINIC | Age: 84
End: 2024-04-18

## 2024-04-18 VITALS
OXYGEN SATURATION: 97 % | WEIGHT: 121.6 LBS | HEART RATE: 62 BPM | BODY MASS INDEX: 24.52 KG/M2 | DIASTOLIC BLOOD PRESSURE: 79 MMHG | RESPIRATION RATE: 18 BRPM | HEIGHT: 59 IN | TEMPERATURE: 97.8 F | SYSTOLIC BLOOD PRESSURE: 130 MMHG

## 2024-04-18 DIAGNOSIS — K58.9 IRRITABLE BOWEL SYNDROME, UNSPECIFIED TYPE: ICD-10-CM

## 2024-04-18 DIAGNOSIS — R10.84 GENERALIZED ABDOMINAL PAIN: ICD-10-CM

## 2024-04-18 DIAGNOSIS — F41.1 GAD (GENERALIZED ANXIETY DISORDER): ICD-10-CM

## 2024-04-18 DIAGNOSIS — F51.01 PRIMARY INSOMNIA: ICD-10-CM

## 2024-04-18 DIAGNOSIS — F41.1 GAD (GENERALIZED ANXIETY DISORDER): Primary | ICD-10-CM

## 2024-04-18 DIAGNOSIS — K21.9 GASTROESOPHAGEAL REFLUX DISEASE WITHOUT ESOPHAGITIS: ICD-10-CM

## 2024-04-18 DIAGNOSIS — E78.5 DYSLIPIDEMIA: Primary | ICD-10-CM

## 2024-04-18 DIAGNOSIS — M15.9 PRIMARY OSTEOARTHRITIS INVOLVING MULTIPLE JOINTS: ICD-10-CM

## 2024-04-18 LAB
ALBUMIN SERPL-MCNC: 3.6 G/DL (ref 3.5–5)
ALBUMIN/GLOB SERPL: 1.2 (ref 1.1–2.2)
ALP SERPL-CCNC: 72 U/L (ref 45–117)
ALT SERPL-CCNC: 18 U/L (ref 12–78)
ANION GAP SERPL CALC-SCNC: 4 MMOL/L (ref 5–15)
AST SERPL-CCNC: 21 U/L (ref 15–37)
BILIRUB SERPL-MCNC: 0.6 MG/DL (ref 0.2–1)
BUN SERPL-MCNC: 20 MG/DL (ref 6–20)
BUN/CREAT SERPL: 19 (ref 12–20)
CALCIUM SERPL-MCNC: 9.8 MG/DL (ref 8.5–10.1)
CHLORIDE SERPL-SCNC: 109 MMOL/L (ref 97–108)
CHOLEST SERPL-MCNC: 224 MG/DL
CK SERPL-CCNC: 75 U/L (ref 26–192)
CO2 SERPL-SCNC: 27 MMOL/L (ref 21–32)
CREAT SERPL-MCNC: 1.03 MG/DL (ref 0.55–1.02)
GLOBULIN SER CALC-MCNC: 2.9 G/DL (ref 2–4)
GLUCOSE SERPL-MCNC: 90 MG/DL (ref 65–100)
HDLC SERPL-MCNC: 97 MG/DL
HDLC SERPL: 2.3 (ref 0–5)
LDLC SERPL CALC-MCNC: 102 MG/DL (ref 0–100)
POTASSIUM SERPL-SCNC: 4.6 MMOL/L (ref 3.5–5.1)
PROT SERPL-MCNC: 6.5 G/DL (ref 6.4–8.2)
SODIUM SERPL-SCNC: 140 MMOL/L (ref 136–145)
TRIGL SERPL-MCNC: 125 MG/DL
VLDLC SERPL CALC-MCNC: 25 MG/DL

## 2024-04-18 RX ORDER — LEVOTHYROXINE SODIUM 88 UG/1
88 TABLET ORAL DAILY
Qty: 90 TABLET | Refills: 3 | Status: SHIPPED | OUTPATIENT
Start: 2024-04-18

## 2024-04-18 RX ORDER — MELOXICAM 7.5 MG/1
7.5 TABLET ORAL PRN
COMMUNITY

## 2024-04-18 RX ORDER — ESCITALOPRAM OXALATE 20 MG/1
20 TABLET ORAL DAILY
Qty: 90 TABLET | Refills: 3 | Status: SHIPPED | OUTPATIENT
Start: 2024-04-18

## 2024-04-18 RX ORDER — LORAZEPAM 0.5 MG/1
0.5 TABLET ORAL NIGHTLY PRN
Qty: 30 TABLET | Refills: 0 | Status: SHIPPED | OUTPATIENT
Start: 2024-04-18 | End: 2024-05-18

## 2024-04-18 ASSESSMENT — PATIENT HEALTH QUESTIONNAIRE - PHQ9
9. THOUGHTS THAT YOU WOULD BE BETTER OFF DEAD, OR OF HURTING YOURSELF: NOT AT ALL
SUM OF ALL RESPONSES TO PHQ QUESTIONS 1-9: 0
SUM OF ALL RESPONSES TO PHQ9 QUESTIONS 1 & 2: 0
10. IF YOU CHECKED OFF ANY PROBLEMS, HOW DIFFICULT HAVE THESE PROBLEMS MADE IT FOR YOU TO DO YOUR WORK, TAKE CARE OF THINGS AT HOME, OR GET ALONG WITH OTHER PEOPLE: NOT DIFFICULT AT ALL
6. FEELING BAD ABOUT YOURSELF - OR THAT YOU ARE A FAILURE OR HAVE LET YOURSELF OR YOUR FAMILY DOWN: NOT AT ALL
7. TROUBLE CONCENTRATING ON THINGS, SUCH AS READING THE NEWSPAPER OR WATCHING TELEVISION: NOT AT ALL
SUM OF ALL RESPONSES TO PHQ QUESTIONS 1-9: 0
1. LITTLE INTEREST OR PLEASURE IN DOING THINGS: NOT AT ALL
4. FEELING TIRED OR HAVING LITTLE ENERGY: NOT AT ALL
3. TROUBLE FALLING OR STAYING ASLEEP: NOT AT ALL
SUM OF ALL RESPONSES TO PHQ QUESTIONS 1-9: 0
SUM OF ALL RESPONSES TO PHQ QUESTIONS 1-9: 0
8. MOVING OR SPEAKING SO SLOWLY THAT OTHER PEOPLE COULD HAVE NOTICED. OR THE OPPOSITE, BEING SO FIGETY OR RESTLESS THAT YOU HAVE BEEN MOVING AROUND A LOT MORE THAN USUAL: NOT AT ALL
2. FEELING DOWN, DEPRESSED OR HOPELESS: NOT AT ALL
5. POOR APPETITE OR OVEREATING: NOT AT ALL

## 2024-04-18 NOTE — PROGRESS NOTES
Merari Eden is a 84 y.o. female     Chief Complaint   Patient presents with    6 Month Follow-Up       /79 (Site: Left Upper Arm, Position: Sitting, Cuff Size: Small Adult)   Pulse 62   Temp 97.8 °F (36.6 °C) (Temporal)   Resp 18   Ht 1.499 m (4' 11\")   Wt 55.2 kg (121 lb 9.6 oz)   LMP  (LMP Unknown)   SpO2 97%   BMI 24.56 kg/m²     Health Maintenance Due   Topic Date Due    Respiratory Syncytial Virus (RSV) Pregnant or age 60 yrs+ (1 - 1-dose 60+ series) Never done    Shingles vaccine (2 of 2) 07/11/2019    COVID-19 Vaccine (4 - 2023-24 season) 09/01/2023    DTaP/Tdap/Td vaccine (2 - Td or Tdap) 01/09/2024         \"Have you been to the ER, urgent care clinic since your last visit?  Hospitalized since your last visit?\"    NO    “Have you seen or consulted any other health care providers outside of [facility] since your last visit?”    NO                     
normal pulses femoral, PT and DP  NEUROLOGIC: non-focal exam, A & O X 3  PSYCHIATRIC:, appropriate affect     ASSESSMENT:   1. Dyslipidemia    2. Gastroesophageal reflux disease without esophagitis    3. Primary osteoarthritis involving multiple joints    4. NIMA (generalized anxiety disorder)    5. Irritable bowel syndrome, unspecified type    6. Primary insomnia    7. Generalized abdominal pain      Impression    Dyslipidemia prior lab reviewed and repeat status is pending  2 GERD that is stable  3 DJD that is stable  4 anxiety controlled  5 IBS questionable that she may be gluten intolerant I will check a celiac panel  6 insomnia seems to be stable  7.  Generalized abdominal pain as noted.  I renewed her medications.  I will see her again in 6 months.  I will call with today's lab results.    PLAN:  1. Dyslipidemia  -     CK; Future  -     Lipid Panel; Future  -     Comprehensive Metabolic Panel; Future  2. Gastroesophageal reflux disease without esophagitis  3. Primary osteoarthritis involving multiple joints  4. NIMA (generalized anxiety disorder)  5. Irritable bowel syndrome, unspecified type  6. Primary insomnia  7. Generalized abdominal pain  -     Celiac Panel Reflex to Titer; Future          ATTENTION:   This medical record was transcribed using an electronic medical records system.  Although proofread, it may and can contain electronic and spelling errors.  Other human spelling and other errors may be present.  Corrections may be executed at a later time.  Please feel free to contact us for any clarifications as needed.      No follow-up provider specified.    No results found for any visits on 04/18/24.    John Norman MD    The patient verbalized understanding of the problems and plans as explained.

## 2024-04-18 NOTE — TELEPHONE ENCOUNTER
RX refill request from the patient/pharmacy. Patient last seen 04- with labs, and next appt. scheduled for 10-  Requested Prescriptions     Pending Prescriptions Disp Refills    LORazepam (ATIVAN) 0.5 MG tablet 30 tablet 0     Sig: Take 1 tablet by mouth nightly as needed for Anxiety for up to 30 days. Max Daily Amount: 0.5 mg    .

## 2024-04-21 LAB
GLIADIN PEPTIDE IGA SER-ACNC: 7 UNITS (ref 0–19)
IGA SERPL-MCNC: 168 MG/DL (ref 64–422)
TTG IGA SER-ACNC: <2 U/ML (ref 0–3)

## 2024-05-08 ENCOUNTER — OFFICE VISIT (OUTPATIENT)
Facility: CLINIC | Age: 84
End: 2024-05-08

## 2024-05-08 VITALS
HEIGHT: 59 IN | OXYGEN SATURATION: 96 % | HEART RATE: 61 BPM | SYSTOLIC BLOOD PRESSURE: 136 MMHG | BODY MASS INDEX: 25 KG/M2 | TEMPERATURE: 97.7 F | DIASTOLIC BLOOD PRESSURE: 82 MMHG | RESPIRATION RATE: 17 BRPM | WEIGHT: 124 LBS

## 2024-05-08 DIAGNOSIS — M79.672 FOOT PAIN, LEFT: Primary | ICD-10-CM

## 2024-05-08 RX ORDER — MELOXICAM 15 MG/1
15 TABLET ORAL DAILY
Qty: 14 TABLET | Refills: 0 | OUTPATIENT
Start: 2024-05-08

## 2024-05-08 NOTE — PROGRESS NOTES
Merari Eden is a 84 y.o. female     Chief Complaint   Patient presents with    Foot Pain       BP (!) 157/72 (Site: Left Upper Arm, Position: Sitting, Cuff Size: Medium Adult)   Pulse 61   Temp 97.7 °F (36.5 °C) (Temporal)   Resp 17   Ht 1.499 m (4' 11\")   Wt 56.2 kg (124 lb)   LMP  (LMP Unknown)   SpO2 96%   BMI 25.04 kg/m²     Health Maintenance Due   Topic Date Due    Respiratory Syncytial Virus (RSV) Pregnant or age 60 yrs+ (1 - 1-dose 60+ series) Never done    Shingles vaccine (2 of 2) 07/11/2019    COVID-19 Vaccine (4 - 2023-24 season) 09/01/2023    DTaP/Tdap/Td vaccine (2 - Td or Tdap) 01/09/2024         \"Have you been to the ER, urgent care clinic since your last visit?  Hospitalized since your last visit?\"    [facility] 5/5/2024    “Have you seen or consulted any other health care providers outside of [facility] since your last visit?”    [facility] urgent 5/5/2024

## 2024-05-08 NOTE — PROGRESS NOTES
Subjective:   Merari Eden is a 84 y.o. female      Chief Complaint   Patient presents with    Foot Pain        History of present illness: She presents complaints for left foot pain of the heel has been present over the past couple of weeks that has become significantly worse to the point where she has a hard time walking and now.  She has had no falls or trauma.    Patient Active Problem List   Diagnosis    Long-term use of immunosuppressant medication    Rosacea blepharoconjunctivitis    Epigastric pain    Dyslipidemia    Psoriasis    Insomnia    NIMA (generalized anxiety disorder)    IBS (irritable bowel syndrome)    GERD (gastroesophageal reflux disease)    Acquired hypothyroidism    Primary osteoarthritis involving multiple joints    Depression    Iron deficiency anemia    Vitamin D deficiency    Alcohol screening    DJD (degenerative joint disease) of cervical spine    Acute colitis    Acute urinary tract infection      Past Medical History:   Diagnosis Date    Acute recurrent pansinusitis 2/23/2018    Annual physical exam 8/3/2017    Anxiety 8/3/2017    Arthralgia 8/3/2017    Arthritis     Arthritis of right hip 8/3/2017    Arthritis, hip 8/3/2017    Autoimmune disease (HCC)     psoriasis    Avascular necrosis of femoral head (HCC) 8/3/2017    Bone loss 8/3/2017    Cancer (HCC)     endometrial    Chronic eczematous otitis externa of both ears 8/24/2017    Contusion of left knee 8/3/2017    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

## 2024-05-10 RX ORDER — FOLIC ACID 1 MG/1
2000 TABLET ORAL DAILY
Qty: 180 TABLET | Refills: 1 | Status: SHIPPED | OUTPATIENT
Start: 2024-05-10

## 2024-05-10 NOTE — TELEPHONE ENCOUNTER
RX refill request from the patient/pharmacy. Patient last seen 05- with labs, and next appt. scheduled for 10-  Requested Prescriptions     Pending Prescriptions Disp Refills    folic acid (FOLVITE) 1 MG tablet 180 tablet 1     Sig: Take 2 tablets by mouth daily    .

## 2024-05-10 NOTE — TELEPHONE ENCOUNTER
Pharmacy: [facility]     folic acid (FOLVITE) 1 MG tablet [[id number]]    Order Details  Dose, Route, Frequency: As Directed   Dispense Quantity: 180 tablet Refills: 1          Sig: TAKE 2 TABLETS BY MOUTH DAILY

## 2024-06-10 ENCOUNTER — OFFICE VISIT (OUTPATIENT)
Facility: CLINIC | Age: 84
End: 2024-06-10
Payer: MEDICARE

## 2024-06-10 VITALS
SYSTOLIC BLOOD PRESSURE: 138 MMHG | TEMPERATURE: 98 F | DIASTOLIC BLOOD PRESSURE: 72 MMHG | WEIGHT: 121.7 LBS | OXYGEN SATURATION: 96 % | HEART RATE: 66 BPM | BODY MASS INDEX: 24.53 KG/M2 | RESPIRATION RATE: 16 BRPM | HEIGHT: 59 IN

## 2024-06-10 DIAGNOSIS — M54.32 SCIATICA OF LEFT SIDE: Primary | ICD-10-CM

## 2024-06-10 PROCEDURE — 1090F PRES/ABSN URINE INCON ASSESS: CPT | Performed by: INTERNAL MEDICINE

## 2024-06-10 PROCEDURE — 1123F ACP DISCUSS/DSCN MKR DOCD: CPT | Performed by: INTERNAL MEDICINE

## 2024-06-10 PROCEDURE — 1036F TOBACCO NON-USER: CPT | Performed by: INTERNAL MEDICINE

## 2024-06-10 PROCEDURE — G8420 CALC BMI NORM PARAMETERS: HCPCS | Performed by: INTERNAL MEDICINE

## 2024-06-10 PROCEDURE — G8427 DOCREV CUR MEDS BY ELIG CLIN: HCPCS | Performed by: INTERNAL MEDICINE

## 2024-06-10 PROCEDURE — 99213 OFFICE O/P EST LOW 20 MIN: CPT | Performed by: INTERNAL MEDICINE

## 2024-06-10 RX ORDER — PREDNISONE 5 MG/1
TABLET ORAL
Qty: 1 EACH | Refills: 0 | Status: SHIPPED | OUTPATIENT
Start: 2024-06-10

## 2024-06-10 NOTE — PROGRESS NOTES
Subjective:   Merari Eden is a 84 y.o. female      Chief Complaint   Patient presents with    Pain     Pain L buttock & L leg        History of present illness: She presents complaining of some pain in the left buttock that seems to radiate down the left leg consistent with a sciatica acting up which she has had before.  She notes no history of falls or trauma.  She notes no other complaints.    Patient Active Problem List   Diagnosis    Long-term use of immunosuppressant medication    Rosacea blepharoconjunctivitis    Epigastric pain    Dyslipidemia    Psoriasis    Insomnia    NIMA (generalized anxiety disorder)    IBS (irritable bowel syndrome)    GERD (gastroesophageal reflux disease)    Acquired hypothyroidism    Primary osteoarthritis involving multiple joints    Depression    Iron deficiency anemia    Vitamin D deficiency    Alcohol screening    DJD (degenerative joint disease) of cervical spine    Acute colitis    Acute urinary tract infection    Sciatica of left side      Past Medical History:   Diagnosis Date    Acute recurrent pansinusitis 2/23/2018    Annual physical exam 8/3/2017    Anxiety 8/3/2017    Arthralgia 8/3/2017    Arthritis     Arthritis of right hip 8/3/2017    Arthritis, hip 8/3/2017    Autoimmune disease (HCC)     psoriasis    Avascular necrosis of femoral head (HCC) 8/3/2017    Bone loss 8/3/2017    Cancer (HCC)     endometrial    Chronic eczematous otitis externa of both ears 8/24/2017    Contusion of left knee 8/3/2017    Degenerative arthritis of lumbar spine 8/3/2017    Depression     Elevated liver function tests 8/3/2017    NIMA (generalized anxiety disorder) 8/3/2017    GERD (gastroesophageal reflux disease)     Glaucoma 8/3/2017    Glaucoma     Headache, acute 8/3/2017    Herpes zoster without complication 10/25/2017    History of colonoscopy with polypectomy 8/3/2017    History of endometrial cancer 8/3/2017    Hyperlipidemia 8/3/2017    Hypothyroid 8/3/2017    IBS (irritable

## 2024-06-10 NOTE — PROGRESS NOTES
Merari Eden is a 84 y.o. female     Chief Complaint   Patient presents with    Pain     Pain L buttock & L leg       BP (!) 140/70 (Site: Left Upper Arm, Position: Sitting, Cuff Size: Large Adult)   Pulse 66   Temp 98 °F (36.7 °C) (Oral)   Resp 16   Ht 1.499 m (4' 11\")   Wt 55.2 kg (121 lb 11.2 oz)   LMP  (LMP Unknown)   SpO2 96%   BMI 24.58 kg/m²     Health Maintenance Due   Topic Date Due    Respiratory Syncytial Virus (RSV) Pregnant or age 60 yrs+ (1 - 1-dose 60+ series) Never done    Shingles vaccine (2 of 2) 07/11/2019    COVID-19 Vaccine (4 - 2023-24 season) 09/01/2023    DTaP/Tdap/Td vaccine (2 - Td or Tdap) 01/09/2024         \"Have you been to the ER, urgent care clinic since your last visit?  Hospitalized since your last visit?\"    NO    “Have you seen or consulted any other health care providers outside of [facility] since your last visit?”    NO

## 2024-07-17 RX ORDER — PANTOPRAZOLE SODIUM 40 MG/1
40 TABLET, DELAYED RELEASE ORAL DAILY
Qty: 90 TABLET | Refills: 3 | Status: SHIPPED | OUTPATIENT
Start: 2024-07-17

## 2024-07-17 NOTE — TELEPHONE ENCOUNTER
RX refill request from the patient/pharmacy. Patient last seen 06- with labs, and next appt. scheduled for 10-  Requested Prescriptions     Pending Prescriptions Disp Refills    pantoprazole (PROTONIX) 40 MG tablet [Pharmacy Med Name: PANTOPRAZOLE 40MG TABLETS] 90 tablet 3     Sig: TAKE 1 TABLET BY MOUTH DAILY    .

## 2024-07-26 DIAGNOSIS — K58.0 IRRITABLE BOWEL SYNDROME WITH DIARRHEA: Primary | ICD-10-CM

## 2024-07-29 RX ORDER — DICYCLOMINE HYDROCHLORIDE 10 MG/1
CAPSULE ORAL
Qty: 120 CAPSULE | Refills: 0 | Status: SHIPPED | OUTPATIENT
Start: 2024-07-29

## 2024-07-29 NOTE — TELEPHONE ENCOUNTER
PCP: John Norman MD    Last appt: 6/10/2024  Future Appointments   Date Time Provider Department Center   10/18/2024  8:20 AM John Norman MD [facility] AMB       Requested Prescriptions     Pending Prescriptions Disp Refills    dicyclomine (BENTYL) 10 MG capsule [Pharmacy Med Name: DICYCLOMINE 10MG CAPSULES] 120 capsule      Sig: TAKE 1 CAPSULE BY MOUTH FOUR TIMES DAILY       Prior labs and Blood pressures:  BP Readings from Last 3 Encounters:   06/10/24 138/72   05/08/24 136/82   04/18/24 130/79     Lab Results   Component Value Date/Time     04/18/2024 09:02 AM    K 4.6 04/18/2024 09:02 AM     04/18/2024 09:02 AM    CO2 27 04/18/2024 09:02 AM    BUN 20 04/18/2024 09:02 AM    GFRAA >60 08/11/2021 11:01 AM     No results found for: \"HBA1C\", \"[id number]\"  Lab Results   Component Value Date/Time    CHOL 224 04/18/2024 09:02 AM    HDL 97 04/18/2024 09:02 AM     04/18/2024 09:02 AM    VLDL 25 04/18/2024 09:02 AM    VLDL 29 06/10/2020 10:54 AM     No results found for: \"VITD3\"        Lab Results   Component Value Date/Time    TSH 3.57 10/18/2023 09:43 AM

## 2024-10-08 ENCOUNTER — OFFICE VISIT (OUTPATIENT)
Facility: CLINIC | Age: 84
End: 2024-10-08
Payer: MEDICARE

## 2024-10-08 VITALS
SYSTOLIC BLOOD PRESSURE: 130 MMHG | OXYGEN SATURATION: 97 % | WEIGHT: 122.7 LBS | RESPIRATION RATE: 16 BRPM | DIASTOLIC BLOOD PRESSURE: 72 MMHG | TEMPERATURE: 98.7 F | BODY MASS INDEX: 24.74 KG/M2 | HEART RATE: 72 BPM | HEIGHT: 59 IN

## 2024-10-08 DIAGNOSIS — K58.0 IRRITABLE BOWEL SYNDROME WITH DIARRHEA: Primary | ICD-10-CM

## 2024-10-08 PROCEDURE — 1036F TOBACCO NON-USER: CPT | Performed by: NURSE PRACTITIONER

## 2024-10-08 PROCEDURE — 1090F PRES/ABSN URINE INCON ASSESS: CPT | Performed by: NURSE PRACTITIONER

## 2024-10-08 PROCEDURE — G8427 DOCREV CUR MEDS BY ELIG CLIN: HCPCS | Performed by: NURSE PRACTITIONER

## 2024-10-08 PROCEDURE — 99213 OFFICE O/P EST LOW 20 MIN: CPT | Performed by: NURSE PRACTITIONER

## 2024-10-08 PROCEDURE — G8420 CALC BMI NORM PARAMETERS: HCPCS | Performed by: NURSE PRACTITIONER

## 2024-10-08 PROCEDURE — 1123F ACP DISCUSS/DSCN MKR DOCD: CPT | Performed by: NURSE PRACTITIONER

## 2024-10-08 PROCEDURE — G8484 FLU IMMUNIZE NO ADMIN: HCPCS | Performed by: NURSE PRACTITIONER

## 2024-10-08 SDOH — ECONOMIC STABILITY: FOOD INSECURITY: WITHIN THE PAST 12 MONTHS, THE FOOD YOU BOUGHT JUST DIDN'T LAST AND YOU DIDN'T HAVE MONEY TO GET MORE.: NEVER TRUE

## 2024-10-08 SDOH — ECONOMIC STABILITY: INCOME INSECURITY: HOW HARD IS IT FOR YOU TO PAY FOR THE VERY BASICS LIKE FOOD, HOUSING, MEDICAL CARE, AND HEATING?: NOT HARD AT ALL

## 2024-10-08 SDOH — ECONOMIC STABILITY: FOOD INSECURITY: WITHIN THE PAST 12 MONTHS, YOU WORRIED THAT YOUR FOOD WOULD RUN OUT BEFORE YOU GOT MONEY TO BUY MORE.: NEVER TRUE

## 2024-10-08 ASSESSMENT — ENCOUNTER SYMPTOMS
NAUSEA: 1
RECTAL PAIN: 1
ABDOMINAL DISTENTION: 1
SHORTNESS OF BREATH: 0
CONSTIPATION: 1
DIARRHEA: 1
BLOOD IN STOOL: 0

## 2024-10-08 NOTE — PROGRESS NOTES
Merair Eden is a 84 y.o. female     Chief Complaint   Patient presents with    Abdominal Pain     Abdominal bloating, Urge for a BM after eating-Dr. Prince       /72 (Site: Left Upper Arm, Position: Sitting, Cuff Size: Medium Adult)   Pulse 72   Temp 98.7 °F (37.1 °C) (Oral)   Resp 16   Ht 1.499 m (4' 11\")   Wt 55.7 kg (122 lb 11.2 oz)   LMP  (LMP Unknown)   SpO2 97%   BMI 24.78 kg/m²     Health Maintenance Due   Topic Date Due    Respiratory Syncytial Virus (RSV) Pregnant or age 60 yrs+ (1 - 1-dose 60+ series) Never done    Shingles vaccine (2 of 2) 07/11/2019    DTaP/Tdap/Td vaccine (2 - Td or Tdap) 01/09/2024    Flu vaccine (1) 08/01/2024    COVID-19 Vaccine (4 - 2023-24 season) 09/01/2024         \"Have you been to the ER, urgent care clinic since your last visit?  Hospitalized since your last visit?\"    NO    “Have you seen or consulted any other health care providers outside of [facility] since your last visit?”    NO                     
DAILY 7/29/24   Maurilio Barriga, APRN - NP   pantoprazole (PROTONIX) 40 MG tablet TAKE 1 TABLET BY MOUTH DAILY 7/17/24   John Norman MD   predniSONE 5 MG (21) TBPK Take prednisone 5 mg 6-day Dosepak as directed 6/10/24   John Norman MD   folic acid (FOLVITE) 1 MG tablet Take 2 tablets by mouth daily 5/10/24   John Norman MD   meloxicam (MOBIC) 15 MG tablet Take 1 tablet by mouth daily 5/8/24   John Norman MD   meloxicam (MOBIC) 7.5 MG tablet Take 1 tablet by mouth as needed for Pain    Provider, MD Opal   levothyroxine (SYNTHROID) 88 MCG tablet Take 1 tablet by mouth daily 4/18/24   John Norman MD   escitalopram (LEXAPRO) 20 MG tablet Take 1 tablet by mouth daily 4/18/24   John Norman MD   Netarsudil Dimesylate (RHOPRESSA) 0.02 % SOLN  7/20/23   Provider, MD Opal   acetaminophen (TYLENOL) 500 MG tablet Take 1 tablet by mouth every 6 hours as needed    Automatic Reconciliation, Ar   Cetirizine HCl (ZYRTEC ALLERGY) 10 MG CAPS Take by mouth    Automatic Reconciliation, Ar   dorzolamide-timolol (COSOPT) 22.3-6.8 MG/ML ophthalmic solution Cosopt 22.3 mg-6.8 mg/mL eye drops   Prescribed by non [facility] MD 5/12/17   Automatic Reconciliation, Ar   Travoprost, BAK Free, (TRAVATAN Z) 0.004 % SOLN ophthalmic solution Apply 1 drop to eye    Automatic Reconciliation, Ar        Vitals:    10/08/24 1604   BP: 130/72   Site: Left Upper Arm   Position: Sitting   Cuff Size: Medium Adult   Pulse: 72   Resp: 16   Temp: 98.7 °F (37.1 °C)   TempSrc: Oral   SpO2: 97%   Weight: 55.7 kg (122 lb 11.2 oz)   Height: 1.499 m (4' 11\")       Body mass index is 24.78 kg/m².  Last Weight Metrics:      6/10/2024    10:00 AM 5/8/2024     2:39 PM 4/18/2024     8:29 AM 10/18/2023     9:16 AM 8/23/2023    10:13 AM 5/31/2023    12:10 PM 4/26/2023     9:21 AM   Weight Loss Metrics   Height 4' 11\" 4' 11\" 4' 11\" 4' 11\" 4' 11\" 4' 11\" 4' 11\"   Weight - Scale 121 lbs 11 oz 124 lbs 121 lbs 10 oz 122 lbs 13

## 2024-10-11 DIAGNOSIS — F41.1 GAD (GENERALIZED ANXIETY DISORDER): ICD-10-CM

## 2024-10-14 RX ORDER — LORAZEPAM 0.5 MG/1
TABLET ORAL
Qty: 30 TABLET | Refills: 0 | Status: SHIPPED | OUTPATIENT
Start: 2024-10-14 | End: 2024-11-13

## 2024-10-14 NOTE — TELEPHONE ENCOUNTER
PCP: John Norman MD    Last appt: 6/10/2024    Future Appointments   Date Time Provider Department Center   10/18/2024  8:20 AM John Norman MD [facility] DEP       Requested Prescriptions     Pending Prescriptions Disp Refills    LORazepam (ATIVAN) 0.5 MG tablet [Pharmacy Med Name: LORAZEPAM 0.5MG TABLETS] 30 tablet 0     Sig: TAKE 1 TABLET BY MOUTH EVERY NIGHT AS NEEDED FOR ANXIETY. MAX DAILY AMOUNT: 0.5 MG

## 2024-10-18 ENCOUNTER — OFFICE VISIT (OUTPATIENT)
Facility: CLINIC | Age: 84
End: 2024-10-18

## 2024-10-18 VITALS
TEMPERATURE: 97.7 F | OXYGEN SATURATION: 98 % | DIASTOLIC BLOOD PRESSURE: 72 MMHG | BODY MASS INDEX: 24.21 KG/M2 | HEART RATE: 62 BPM | SYSTOLIC BLOOD PRESSURE: 124 MMHG | HEIGHT: 59 IN | WEIGHT: 120.1 LBS

## 2024-10-18 DIAGNOSIS — K21.9 GASTROESOPHAGEAL REFLUX DISEASE WITHOUT ESOPHAGITIS: ICD-10-CM

## 2024-10-18 DIAGNOSIS — D50.9 IRON DEFICIENCY ANEMIA, UNSPECIFIED IRON DEFICIENCY ANEMIA TYPE: ICD-10-CM

## 2024-10-18 DIAGNOSIS — L40.9 PSORIASIS: ICD-10-CM

## 2024-10-18 DIAGNOSIS — M47.812 SPONDYLOSIS OF CERVICAL REGION WITHOUT MYELOPATHY OR RADICULOPATHY: ICD-10-CM

## 2024-10-18 DIAGNOSIS — E55.9 VITAMIN D DEFICIENCY: ICD-10-CM

## 2024-10-18 DIAGNOSIS — K58.9 IRRITABLE BOWEL SYNDROME, UNSPECIFIED TYPE: ICD-10-CM

## 2024-10-18 DIAGNOSIS — F51.01 PRIMARY INSOMNIA: ICD-10-CM

## 2024-10-18 DIAGNOSIS — F41.1 GAD (GENERALIZED ANXIETY DISORDER): ICD-10-CM

## 2024-10-18 DIAGNOSIS — M15.0 PRIMARY OSTEOARTHRITIS INVOLVING MULTIPLE JOINTS: ICD-10-CM

## 2024-10-18 DIAGNOSIS — E03.9 ACQUIRED HYPOTHYROIDISM: ICD-10-CM

## 2024-10-18 DIAGNOSIS — Z00.00 MEDICARE ANNUAL WELLNESS VISIT, SUBSEQUENT: ICD-10-CM

## 2024-10-18 DIAGNOSIS — Z23 NEEDS FLU SHOT: ICD-10-CM

## 2024-10-18 DIAGNOSIS — F32.A DEPRESSION, UNSPECIFIED DEPRESSION TYPE: ICD-10-CM

## 2024-10-18 DIAGNOSIS — E78.5 DYSLIPIDEMIA: Primary | ICD-10-CM

## 2024-10-18 DIAGNOSIS — Z13.39 ALCOHOL SCREENING: ICD-10-CM

## 2024-10-18 LAB
25(OH)D3 SERPL-MCNC: 49.3 NG/ML (ref 30–100)
ALBUMIN SERPL-MCNC: 3.6 G/DL (ref 3.5–5)
ALBUMIN/GLOB SERPL: 1.2 (ref 1.1–2.2)
ALP SERPL-CCNC: 75 U/L (ref 45–117)
ALT SERPL-CCNC: 22 U/L (ref 12–78)
ANION GAP SERPL CALC-SCNC: 3 MMOL/L (ref 2–12)
APPEARANCE UR: ABNORMAL
AST SERPL-CCNC: 24 U/L (ref 15–37)
BACTERIA URNS QL MICRO: ABNORMAL /HPF
BASOPHILS # BLD: 0.1 K/UL (ref 0–0.1)
BASOPHILS NFR BLD: 1 % (ref 0–1)
BILIRUB SERPL-MCNC: 0.5 MG/DL (ref 0.2–1)
BILIRUB UR QL: NEGATIVE
BUN SERPL-MCNC: 29 MG/DL (ref 6–20)
BUN/CREAT SERPL: 30 (ref 12–20)
CALCIUM SERPL-MCNC: 10.5 MG/DL (ref 8.5–10.1)
CHLORIDE SERPL-SCNC: 109 MMOL/L (ref 97–108)
CHOLEST SERPL-MCNC: 245 MG/DL
CK SERPL-CCNC: 108 U/L (ref 26–192)
CO2 SERPL-SCNC: 29 MMOL/L (ref 21–32)
COLOR UR: ABNORMAL
CREAT SERPL-MCNC: 0.98 MG/DL (ref 0.55–1.02)
DIFFERENTIAL METHOD BLD: NORMAL
EOSINOPHIL # BLD: 0.2 K/UL (ref 0–0.4)
EOSINOPHIL NFR BLD: 3 % (ref 0–7)
EPITH CASTS URNS QL MICRO: ABNORMAL /LPF
ERYTHROCYTE [DISTWIDTH] IN BLOOD BY AUTOMATED COUNT: 13.6 % (ref 11.5–14.5)
GLOBULIN SER CALC-MCNC: 3.1 G/DL (ref 2–4)
GLUCOSE SERPL-MCNC: 81 MG/DL (ref 65–100)
GLUCOSE UR STRIP.AUTO-MCNC: NEGATIVE MG/DL
HCT VFR BLD AUTO: 41.7 % (ref 35–47)
HDLC SERPL-MCNC: 101 MG/DL
HDLC SERPL: 2.4 (ref 0–5)
HGB BLD-MCNC: 13.6 G/DL (ref 11.5–16)
HGB UR QL STRIP: NEGATIVE
HYALINE CASTS URNS QL MICRO: ABNORMAL /LPF (ref 0–5)
IMM GRANULOCYTES # BLD AUTO: 0 K/UL (ref 0–0.04)
IMM GRANULOCYTES NFR BLD AUTO: 0 % (ref 0–0.5)
KETONES UR QL STRIP.AUTO: NEGATIVE MG/DL
LDLC SERPL CALC-MCNC: 122 MG/DL (ref 0–100)
LEUKOCYTE ESTERASE UR QL STRIP.AUTO: ABNORMAL
LYMPHOCYTES # BLD: 2.2 K/UL (ref 0.8–3.5)
LYMPHOCYTES NFR BLD: 47 % (ref 12–49)
MCH RBC QN AUTO: 30.5 PG (ref 26–34)
MCHC RBC AUTO-ENTMCNC: 32.6 G/DL (ref 30–36.5)
MCV RBC AUTO: 93.5 FL (ref 80–99)
MONOCYTES # BLD: 0.4 K/UL (ref 0–1)
MONOCYTES NFR BLD: 9 % (ref 5–13)
NEUTS SEG # BLD: 1.9 K/UL (ref 1.8–8)
NEUTS SEG NFR BLD: 40 % (ref 32–75)
NITRITE UR QL STRIP.AUTO: POSITIVE
NRBC # BLD: 0 K/UL (ref 0–0.01)
NRBC BLD-RTO: 0 PER 100 WBC
PH UR STRIP: 5.5 (ref 5–8)
PLATELET # BLD AUTO: 199 K/UL (ref 150–400)
PMV BLD AUTO: 10.2 FL (ref 8.9–12.9)
POTASSIUM SERPL-SCNC: 4.5 MMOL/L (ref 3.5–5.1)
PROT SERPL-MCNC: 6.7 G/DL (ref 6.4–8.2)
PROT UR STRIP-MCNC: NEGATIVE MG/DL
RBC # BLD AUTO: 4.46 M/UL (ref 3.8–5.2)
RBC #/AREA URNS HPF: ABNORMAL /HPF (ref 0–5)
SODIUM SERPL-SCNC: 141 MMOL/L (ref 136–145)
SP GR UR REFRACTOMETRY: 1.02 (ref 1–1.03)
T4 FREE SERPL-MCNC: 1.1 NG/DL (ref 0.8–1.5)
TRIGL SERPL-MCNC: 110 MG/DL
TSH SERPL DL<=0.05 MIU/L-ACNC: 0.82 UIU/ML (ref 0.36–3.74)
UROBILINOGEN UR QL STRIP.AUTO: 0.2 EU/DL (ref 0.2–1)
VLDLC SERPL CALC-MCNC: 22 MG/DL
WBC # BLD AUTO: 4.8 K/UL (ref 3.6–11)
WBC URNS QL MICRO: ABNORMAL /HPF (ref 0–4)

## 2024-10-18 ASSESSMENT — LIFESTYLE VARIABLES
HOW OFTEN DURING THE LAST YEAR HAVE YOU HAD A FEELING OF GUILT OR REMORSE AFTER DRINKING: LESS THAN MONTHLY
HOW MANY STANDARD DRINKS CONTAINING ALCOHOL DO YOU HAVE ON A TYPICAL DAY: 1 OR 2
HAVE YOU OR SOMEONE ELSE BEEN INJURED AS A RESULT OF YOUR DRINKING: YES, BUT NOT IN THE PAST YEAR
HOW OFTEN DURING THE LAST YEAR HAVE YOU NEEDED AN ALCOHOLIC DRINK FIRST THING IN THE MORNING TO GET YOURSELF GOING AFTER A NIGHT OF HEAVY DRINKING: NEVER
HOW OFTEN DURING THE LAST YEAR HAVE YOU FOUND THAT YOU WERE NOT ABLE TO STOP DRINKING ONCE YOU HAD STARTED: NEVER
HOW OFTEN DURING THE LAST YEAR HAVE YOU BEEN UNABLE TO REMEMBER WHAT HAPPENED THE NIGHT BEFORE BECAUSE YOU HAD BEEN DRINKING: NEVER
HOW OFTEN DURING THE LAST YEAR HAVE YOU FAILED TO DO WHAT WAS NORMALLY EXPECTED FROM YOU BECAUSE OF DRINKING: NEVER
HOW OFTEN DO YOU HAVE A DRINK CONTAINING ALCOHOL: 4 OR MORE TIMES A WEEK
HAS A RELATIVE, FRIEND, DOCTOR, OR ANOTHER HEALTH PROFESSIONAL EXPRESSED CONCERN ABOUT YOUR DRINKING OR SUGGESTED YOU CUT DOWN: YES, BUT NOT IN THE PAST YEAR

## 2024-10-18 ASSESSMENT — PATIENT HEALTH QUESTIONNAIRE - PHQ9
2. FEELING DOWN, DEPRESSED OR HOPELESS: NOT AT ALL
SUM OF ALL RESPONSES TO PHQ QUESTIONS 1-9: 0
SUM OF ALL RESPONSES TO PHQ9 QUESTIONS 1 & 2: 0
SUM OF ALL RESPONSES TO PHQ QUESTIONS 1-9: 0
1. LITTLE INTEREST OR PLEASURE IN DOING THINGS: NOT AT ALL

## 2024-10-18 NOTE — PROGRESS NOTES
Merari Eden is a 84 y.o. female     Chief Complaint   Patient presents with    Medicare AWV       \"Have you been to the ER, urgent care clinic since your last visit?  Hospitalized since your last visit?\"    NO    “Have you seen or consulted any other health care providers outside of [facility] since your last visit?”    Saw ortho for injection in shoulders

## 2024-10-18 NOTE — PROGRESS NOTES
Medicare Annual Wellness Visit    June B Meek is here for Medicare AWV    Assessment & Plan   Dyslipidemia  -     TSH; Future  -     T4, Free; Future  -     Lipid Panel; Future  -     CK; Future  -     Comprehensive Metabolic Panel; Future  Gastroesophageal reflux disease without esophagitis  -     CBC with Auto Differential; Future  Primary osteoarthritis involving multiple joints  -     Urinalysis with Microscopic; Future  Irritable bowel syndrome, unspecified type  INMA (generalized anxiety disorder)  Spondylosis of cervical region without myelopathy or radiculopathy  Primary insomnia  Depression, unspecified depression type  Acquired hypothyroidism  Iron deficiency anemia, unspecified iron deficiency anemia type  Psoriasis  Vitamin D deficiency  -     Vitamin D 25 Hydroxy; Future  Alcohol screening  Medicare annual wellness visit, subsequent  Needs flu shot  -     Influenza, FLUAD Trivalent, (age 65 y+), IM, Preservative Free, 0.5mL    ASSESSMENT:   1. Dyslipidemia    2. Gastroesophageal reflux disease without esophagitis    3. Primary osteoarthritis involving multiple joints    4. Irritable bowel syndrome, unspecified type    5. NIMA (generalized anxiety disorder)    6. Spondylosis of cervical region without myelopathy or radiculopathy    7. Primary insomnia    8. Depression, unspecified depression type    9. Acquired hypothyroidism    10. Iron deficiency anemia, unspecified iron deficiency anemia type    11. Psoriasis    12. Vitamin D deficiency    13. Alcohol screening    14. Medicare annual wellness visit, subsequent    15. Needs flu shot      Impression  1 dyslipidemia prior lab reviewed repeat status pending I will adjust treatment if needed.  2 GERD that is stable  3 DJD that is stable  4 IBS stable  5 generalized anxiety seems to be controlled  6.  Cervical spondylosis recent cortisone injection much better now  7 insomnia controlled  8 depression that is controlled  9 hypothyroidism repeat status

## 2024-10-22 RX ORDER — DOXYCYCLINE 100 MG/1
100 CAPSULE ORAL 2 TIMES DAILY
Qty: 14 CAPSULE | Refills: 0 | Status: SHIPPED | OUTPATIENT
Start: 2024-10-22 | End: 2024-10-29

## 2024-10-22 NOTE — TELEPHONE ENCOUNTER
RX refill request from the patient/pharmacy. Patient last seen 10- with labs, and next appt. scheduled for 04-  Requested Prescriptions     Pending Prescriptions Disp Refills    doxycycline hyclate (VIBRAMYCIN) 100 MG capsule 14 capsule 0     Sig: Take 1 capsule by mouth 2 times daily for 7 days    .

## 2024-10-28 RX ORDER — FOLIC ACID 1 MG/1
2000 TABLET ORAL DAILY
Qty: 180 TABLET | Refills: 3 | Status: SHIPPED | OUTPATIENT
Start: 2024-10-28

## 2024-10-28 NOTE — TELEPHONE ENCOUNTER
RX refill request from the patient/pharmacy. Patient last seen 10- with labs, and next appt. scheduled for 04-  Requested Prescriptions     Pending Prescriptions Disp Refills    folic acid (FOLVITE) 1 MG tablet [Pharmacy Med Name: FOLIC ACID 1MG TABLETS] 180 tablet 3     Sig: TAKE 2 TABLETS BY MOUTH DAILY    .

## 2024-11-06 ENCOUNTER — APPOINTMENT (OUTPATIENT)
Facility: HOSPITAL | Age: 84
End: 2024-11-06
Payer: MEDICARE

## 2024-11-06 ENCOUNTER — HOSPITAL ENCOUNTER (EMERGENCY)
Facility: HOSPITAL | Age: 84
Discharge: HOME | End: 2024-11-06
Payer: MEDICARE

## 2024-11-06 VITALS
RESPIRATION RATE: 18 BRPM | BODY MASS INDEX: 24.24 KG/M2 | DIASTOLIC BLOOD PRESSURE: 74 MMHG | SYSTOLIC BLOOD PRESSURE: 120 MMHG | WEIGHT: 120 LBS | OXYGEN SATURATION: 98 % | TEMPERATURE: 97.9 F | HEART RATE: 60 BPM

## 2024-11-06 DIAGNOSIS — M25.522 LEFT ELBOW PAIN: ICD-10-CM
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
## 2024-11-06 DIAGNOSIS — M25.552 PAIN OF LEFT HIP: ICD-10-CM

## 2024-11-06 DIAGNOSIS — S09.90XA CLOSED HEAD INJURY, INITIAL ENCOUNTER: ICD-10-CM

## 2024-11-06 PROCEDURE — 72125 CT NECK SPINE W/O DYE: CPT

## 2024-11-06 PROCEDURE — 99284 EMERGENCY DEPT VISIT MOD MDM: CPT

## 2024-11-06 PROCEDURE — 73080 X-RAY EXAM OF ELBOW: CPT

## 2024-11-06 PROCEDURE — 73502 X-RAY EXAM HIP UNI 2-3 VIEWS: CPT

## 2024-11-06 PROCEDURE — 70450 CT HEAD/BRAIN W/O DYE: CPT

## 2024-11-06 RX ORDER — MELOXICAM 7.5 MG/1
7.5 TABLET ORAL PRN
Qty: 10 TABLET | Refills: 0 | Status: SHIPPED | OUTPATIENT
Start: 2024-11-06 | End: 2024-11-16

## 2024-11-06 RX ORDER — ACETAMINOPHEN 325 MG/1
650 TABLET ORAL
Status: DISCONTINUED | OUTPATIENT
Start: 2024-11-06 | End: 2024-11-06 | Stop reason: HOSPADM

## 2024-11-06 ASSESSMENT — PAIN - FUNCTIONAL ASSESSMENT: PAIN_FUNCTIONAL_ASSESSMENT: NONE - DENIES PAIN

## 2024-11-06 NOTE — ED NOTES
Patient discharged by EVI George. Patient provided with discharge instructions Rx and instructions on follow up care. Patient ambulatory out of ED with family

## 2024-11-06 NOTE — ED PROVIDER NOTES
[facility] EMERGENCY DEPT  EMERGENCY DEPARTMENT ENCOUNTER       Pt Name: Merari Eden  MRN: [id number]  Birthdate 1940  Date of evaluation: 11/6/2024  Provider: Doris Milligan PA-C   PCP: John Norman MD  Note Started: 2:56 PM EST 11/6/24     CHIEF COMPLAINT       Chief Complaint   Patient presents with    Fall     Pt presents ambulatory to triage via EMS after a mechanical GLF while at the gym. Pt did hit her head, denies LOC and blood thinners        HISTORY OF PRESENT ILLNESS: 1 or more elements      History From: Patient  HPI Limitations: None     Merari Eden is a 84 y.o. female with past medical history dyslipidemia, GERD, arthritis involving multiple joints, IBS, anxiety, cervical spondylosis, insomnia, depression, hypothyroidism, anemia, psoriasis who presents for evaluation after ground-level fall that occurred earlier today.  Patient reports that she was in aerobics class when her left tennis shoe got caught under her, she fell sideways landing on her left buttocks, left elbow and she did hit her head on the floor.  She notes she did not lose consciousness.  She states the fall was witnessed by her  who was also in the aerobics class.  She reports that she is currently experiencing some mild pain to her left glute and left elbow.   She states that she was assisted into a stretcher with the help of EMS onsite, however she reports that she did ambulate here in the ED down the hallway without difficulty.  She does not have a significant headache.  She does not take any blood thinners.  She denies any vision changes, double vision coloration, loss of vision, extremity numbness, extremities, extremity tingling.  Denies any chest pain, abdominal pain.  She denies any lightheadedness, dizziness, syncope prior to the fall.  She denies any fevers or chills.      Nursing Notes were all reviewed and agreed with or any disagreements were addressed in the HPI.     REVIEW OF SYSTEMS      Review of
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
## 2024-11-09 PROBLEM — S09.90XA HEAD TRAUMA: Status: ACTIVE | Noted: 2024-11-09

## 2024-11-11 ENCOUNTER — OFFICE VISIT (OUTPATIENT)
Facility: CLINIC | Age: 84
End: 2024-11-11

## 2024-11-11 VITALS
HEIGHT: 59 IN | HEART RATE: 62 BPM | TEMPERATURE: 98 F | OXYGEN SATURATION: 97 % | DIASTOLIC BLOOD PRESSURE: 78 MMHG | BODY MASS INDEX: 24.37 KG/M2 | WEIGHT: 120.9 LBS | SYSTOLIC BLOOD PRESSURE: 138 MMHG
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
## 2024-11-11 DIAGNOSIS — S09.90XA TRAUMATIC INJURY OF HEAD, INITIAL ENCOUNTER: ICD-10-CM

## 2024-11-11 ASSESSMENT — PATIENT HEALTH QUESTIONNAIRE - PHQ9
SUM OF ALL RESPONSES TO PHQ QUESTIONS 1-9: 0
1. LITTLE INTEREST OR PLEASURE IN DOING THINGS: NOT AT ALL
SUM OF ALL RESPONSES TO PHQ QUESTIONS 1-9: 0
SUM OF ALL RESPONSES TO PHQ9 QUESTIONS 1 & 2: 0
2. FEELING DOWN, DEPRESSED OR HOPELESS: NOT AT ALL

## 2024-11-11 NOTE — PROGRESS NOTES
Merari Eden is a 84 y.o. female     Chief Complaint   Patient presents with    Fall     Fell onto left hip and hit her right elbow and head       \"Have you been to the ER, urgent care clinic since your last visit?  Hospitalized since your last visit?\"    Went to [facility] ER  due to fall.    “Have you seen or consulted any other health care providers outside of [facility] since your last visit?”    no                    
mucous membranes, pharynx clear  NECK: supple. Thyroid normal, No JVD or bruits  RESPIRATORY: Chest: clear to ascultation and percussion, normal inspiratory effort  CARDIOVASCULAR: Heart: regular rate and rhythm no murmurs, rubs or gallops, PMI not displaced, No thrills, no peripheral edema  GASTROINTESTINAL: Abdomen: non distended, soft, non tender, bowel sounds normal  HEMATOLOGIC: no purpura, petechiae or bruising  LYMPHATIC: No lymph node enlargemant  MUSCULOSKELETAL: Extremities: no active synovitis, pulse 1+   INTEGUMENT: No unusual rashes or suspicious skin lesions noted. Nails appear normal.  PERIPHERAL VASCULAR: normal pulses femoral, PT and DP  NEUROLOGIC: non-focal exam, A & O X 3  PSYCHIATRIC:, appropriate affect     ASSESSMENT:   1. Fall, initial encounter    2. Traumatic injury of head, initial encounter      Impression  1 fall initial encounter for me after being seen in the ER on 11/6 that was related to losing her balance and not able anything related to loss of consciousness.  2.  Traumatic head injury that is resolving.  I do not think she had a concussion and at this point I cautioned her to be more careful to avoid future falls  ER records reviewed  Recheck with me per previous schedule or sooner if problems arise    PLAN:  1. Fall, initial encounter  2. Traumatic injury of head, initial encounter          ATTENTION:   This medical record was transcribed using an electronic medical records system.  Although proofread, it may and can contain electronic and spelling errors.  Other human spelling and other errors may be present.  Corrections may be executed at a later time.  Please feel free to contact us for any clarifications as needed.      No follow-up provider specified.    No results found for any visits on 11/11/24.    John Norman MD    The patient verbalized understanding of the problems and plans as explained.

## 2024-11-16 PROBLEM — Z00.00 MEDICARE ANNUAL WELLNESS VISIT, SUBSEQUENT: Status: RESOLVED | Noted: 2022-10-25 | Resolved: 2024-11-16

## 2024-11-27 ENCOUNTER — TELEPHONE (OUTPATIENT)
Facility: CLINIC | Age: 84
End: 2024-11-27

## 2024-11-27 NOTE — TELEPHONE ENCOUNTER
Karon called back from [facility] to provide appropriate fax number.     Fax: [phone]

## 2024-11-27 NOTE — TELEPHONE ENCOUNTER
Patient called in requesting a referral/order to be sent to [facility] for her. States she recently had a fall and due to this she would like to work with physical therapy with regards to her balance to avoid future falls.     [facility]: [phone], 11/27/24 10:20 am- Left voicemail with Kb Nichole attempting to get fax number in order to send order.
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
## 2024-12-18 ENCOUNTER — OFFICE VISIT (OUTPATIENT)
Facility: CLINIC | Age: 84
End: 2024-12-18

## 2024-12-18 VITALS
BODY MASS INDEX: 24.11 KG/M2 | DIASTOLIC BLOOD PRESSURE: 80 MMHG | SYSTOLIC BLOOD PRESSURE: 146 MMHG | TEMPERATURE: 97.9 F | WEIGHT: 119.6 LBS | HEART RATE: 64 BPM | HEIGHT: 59 IN | OXYGEN SATURATION: 97 %

## 2024-12-18 DIAGNOSIS — N30.00 ACUTE CYSTITIS WITHOUT HEMATURIA: Primary | ICD-10-CM

## 2024-12-18 LAB
APPEARANCE UR: ABNORMAL
BACTERIA URNS QL MICRO: NEGATIVE /HPF
BILIRUB UR QL: NEGATIVE
COLOR UR: ABNORMAL
EPITH CASTS URNS QL MICRO: ABNORMAL /LPF
GLUCOSE UR STRIP.AUTO-MCNC: NEGATIVE MG/DL
HGB UR QL STRIP: NEGATIVE
HYALINE CASTS URNS QL MICRO: ABNORMAL /LPF (ref 0–5)
KETONES UR QL STRIP.AUTO: NEGATIVE MG/DL
LEUKOCYTE ESTERASE UR QL STRIP.AUTO: ABNORMAL
NITRITE UR QL STRIP.AUTO: NEGATIVE
PH UR STRIP: 5.5 (ref 5–8)
PROT UR STRIP-MCNC: NEGATIVE MG/DL
RBC #/AREA URNS HPF: ABNORMAL /HPF (ref 0–5)
SP GR UR REFRACTOMETRY: 1.02 (ref 1–1.03)
UROBILINOGEN UR QL STRIP.AUTO: 0.2 EU/DL (ref 0.2–1)
WBC URNS QL MICRO: ABNORMAL /HPF (ref 0–4)

## 2024-12-18 RX ORDER — CIPROFLOXACIN 250 MG/1
250 TABLET, FILM COATED ORAL 2 TIMES DAILY
Qty: 14 TABLET | Refills: 0 | Status: SHIPPED | OUTPATIENT
Start: 2024-12-18 | End: 2024-12-25

## 2024-12-18 NOTE — PROGRESS NOTES
Merari Eden is a 84 y.o. female     Chief Complaint   Patient presents with    Urinary Tract Infection     ?       \"Have you been to the ER, urgent care clinic since your last visit?  Hospitalized since your last visit?\"    NO    “Have you seen or consulted any other health care providers outside of [facility] since your last visit?”    Saw PA for Dr. Prince; Dr. Troncoso about 5 weeks ago for hip pain                      
  Pulse: 64   Temp: 97.9 °F (36.6 °C)   SpO2: 97%      Body mass index is 24.16 kg/m².   Physical Examination:              General Appearance:  Well-developed, well-nourished, no acute distress.             HEENT:      Ears:  The TMs and ear canals were clear.  Eyes:  The pupillary responses were normal.  Extraocular muscle function intact.  Lids and conjunctiva not injected.  Funduscopic exam revealed sharp disc margins.  Nares: Clear w/o edema or erythema  Pharynx:  Clear with teeth in good repair.  No masses were noted.      Neck:  Supple without thyromegaly or adenopathy.  No JVD noted.  No carotid                bruits.   Lungs:  Clear to auscultation and percussion.  Cardiac:  Regular rate and rhythm without murmur.  PMI not displaced.  No gallop, rub or click.  Abdominal: Soft, non-tender, no hepata-spleenomegally or masses  Extremities:  No clubbing, cyanosis or edema.  Skin:  No rash or unusual mole changes noted.    Lymph Nodes:  None felt in the cervical, supraclavicular, axillary or inguinal region.  Neurological: . DTR’s 2+ and symmetric.  Station and gait normal.   Hematologic:   No purpura or petechiae        Assessment/Plan:         1. Acute cystitis without hematuria        Impressions/Plan:  Impression    Clinically she has a cystitis we will treat this with Cipro 250 twice daily for 7 days pending results of urine and culture.  Recheck for previous schedule    Orders Placed This Encounter   Procedures    Culture, Urine     Standing Status:   Future     Standing Expiration Date:   12/18/2025     Order Specific Question:   Specify (ex-cath, midstream, cysto, etc)?     Answer:   f    Urinalysis with Microscopic     Standing Status:   Future     Standing Expiration Date:   12/18/2025     Order Specific Question:   SPECIFY(EX-CATH,MIDSTREAM,CYSTO,ETC)?     Answer:   f          No follow-ups on file.     No results found for any visits on 12/18/24.      John Norman MD    The patient was given

## 2024-12-20 LAB
BACTERIA SPEC CULT: NORMAL
CC UR VC: NORMAL
SERVICE CMNT-IMP: NORMAL

## 2025-01-09 NOTE — TELEPHONE ENCOUNTER
Patient states she has a sore throat. She will test for covid today. Please advise.    NATALIA [phone]

## 2025-01-14 RX ORDER — AZITHROMYCIN 250 MG/1
TABLET, FILM COATED ORAL
Qty: 6 TABLET | Refills: 0 | Status: SHIPPED | OUTPATIENT
Start: 2025-01-14 | End: 2025-01-24

## 2025-01-14 NOTE — TELEPHONE ENCOUNTER
RX refill request from the patient/pharmacy. Patient last seen 2024 with labs, and next appt. scheduled for 2025  Requested Prescriptions     Pending Prescriptions Disp Refills    azithromycin (ZITHROMAX) 250 MG tablet 6 tablet 0     Simg on day 1 followed by 250mg on days 2 - 5    .

## 2025-02-15 ENCOUNTER — HOSPITAL ENCOUNTER (OUTPATIENT)
Facility: HOSPITAL | Age: 85
Discharge: HOME | End: 2025-02-18
Attending: PHYSICAL MEDICINE & REHABILITATION
Payer: MEDICARE

## 2025-02-15 DIAGNOSIS — M51.362 DEGENERATION OF INTERVERTEBRAL DISC OF LUMBAR REGION WITH DISCOGENIC BACK PAIN AND LOWER EXTREMITY PAIN: ICD-10-CM

## 2025-02-15 DIAGNOSIS — M16.12 PRIMARY OSTEOARTHRITIS OF LEFT HIP: ICD-10-CM

## 2025-02-15 DIAGNOSIS — M54.16 LUMBAR RADICULOPATHY: ICD-10-CM

## 2025-02-15 DIAGNOSIS — M25.552 LEFT HIP PAIN: ICD-10-CM

## 2025-02-15 DIAGNOSIS — M47.816 LUMBAR SPONDYLOSIS: ICD-10-CM

## 2025-02-15 DIAGNOSIS — M43.26 FUSION OF SPINE OF LUMBAR REGION: ICD-10-CM

## 2025-02-15 DIAGNOSIS — M70.62 GREATER TROCHANTERIC BURSITIS OF LEFT HIP: ICD-10-CM

## 2025-02-15 PROCEDURE — 73721 MRI JNT OF LWR EXTRE W/O DYE: CPT

## 2025-02-15 PROCEDURE — 6360000004 HC RX CONTRAST MEDICATION: Performed by: PHYSICAL MEDICINE & REHABILITATION

## 2025-02-15 PROCEDURE — A9579 GAD-BASE MR CONTRAST NOS,1ML: HCPCS | Performed by: PHYSICAL MEDICINE & REHABILITATION

## 2025-02-15 PROCEDURE — 72158 MRI LUMBAR SPINE W/O & W/DYE: CPT

## 2025-02-15 RX ADMIN — GADOTERIDOL 10 ML: 279.3 INJECTION, SOLUTION INTRAVENOUS at 09:03

## 2025-04-28 RX ORDER — LEVOTHYROXINE SODIUM 88 UG/1
88 TABLET ORAL DAILY
Qty: 90 TABLET | Refills: 0 | Status: SHIPPED | OUTPATIENT
Start: 2025-04-28 | End: 2025-04-29

## 2025-04-28 NOTE — TELEPHONE ENCOUNTER
PCP: John Norman MD    Last appt: 12/18/2024    No future appointments.    Requested Prescriptions     Pending Prescriptions Disp Refills    levothyroxine (SYNTHROID) 88 MCG tablet [Pharmacy Med Name: Levothyroxine Sodium 88 MCG Oral Tablet] 90 tablet 0     Sig: Take 1 tablet by mouth once daily

## 2025-04-29 RX ORDER — LEVOTHYROXINE SODIUM 88 UG/1
88 TABLET ORAL DAILY
Qty: 90 TABLET | Refills: 0 | Status: SHIPPED | OUTPATIENT
Start: 2025-04-29

## 2025-04-29 NOTE — TELEPHONE ENCOUNTER
PCP: John Norman MD    Last appt: 12/18/2024    Future Appointments   Date Time Provider Department Center   5/5/2025  9:20 AM John Norman MD [facility] DEP       Requested Prescriptions     Pending Prescriptions Disp Refills    levothyroxine (SYNTHROID) 88 MCG tablet [Pharmacy Med Name: Levothyroxine Sodium 88 MCG Oral Tablet] 90 tablet 0     Sig: Take 1 tablet by mouth once daily

## 2025-05-02 NOTE — PROGRESS NOTES
Chief Complaint   Patient presents with    Hypothyroidism     6 month fup    osteoarthritis    Gastroesophageal Reflux       SUBJECTIVE:    June B Meek is a 85 y.o. female who returns in follow-up for medical problems include dyslipidemia, GERD, hypothyroidism, DJD and other medical problems.  She is taking the medication Trant follow her diet remains physically active.  She has had a lot of stress with her recent injury to her .  She otherwise denies any chest pain, shortness of breath or cardiac respiratory complaints.  There are no current GI or  complaints.  She has no headaches, dizziness or neurologic complaints.  She has no change of her chronic arthritic complaints and there are no other complaints on complete review of systems.      Current Outpatient Medications   Medication Sig Dispense Refill    levothyroxine (SYNTHROID) 88 MCG tablet Take 1 tablet by mouth once daily 90 tablet 0    meloxicam (MOBIC) 7.5 MG tablet Take 1 tablet by mouth as needed for Pain 10 tablet 0    folic acid (FOLVITE) 1 MG tablet TAKE 2 TABLETS BY MOUTH DAILY 180 tablet 3    dicyclomine (BENTYL) 10 MG capsule TAKE 1 CAPSULE BY MOUTH FOUR TIMES DAILY 120 capsule 0    pantoprazole (PROTONIX) 40 MG tablet TAKE 1 TABLET BY MOUTH DAILY 90 tablet 3    escitalopram (LEXAPRO) 20 MG tablet Take 1 tablet by mouth daily 90 tablet 3    Netarsudil Dimesylate (RHOPRESSA) 0.02 % SOLN       acetaminophen (TYLENOL) 500 MG tablet Take 1 tablet by mouth every 6 hours as needed      Cetirizine HCl (ZYRTEC ALLERGY) 10 MG CAPS Take by mouth      dorzolamide-timolol (COSOPT) 22.3-6.8 MG/ML ophthalmic solution Cosopt 22.3 mg-6.8 mg/mL eye drops   Prescribed by [facility] MD      Travoprost, BAK Free, (TRAVATAN Z) 0.004 % SOLN ophthalmic solution Apply 1 drop to eye       No current facility-administered medications for this visit.     Past Medical History:   Diagnosis Date    Acute recurrent pansinusitis 2/23/2018    Annual physical exam

## 2025-05-05 ENCOUNTER — OFFICE VISIT (OUTPATIENT)
Facility: CLINIC | Age: 85
End: 2025-05-05
Payer: MEDICARE

## 2025-05-05 VITALS
DIASTOLIC BLOOD PRESSURE: 80 MMHG | WEIGHT: 119.8 LBS | BODY MASS INDEX: 24.15 KG/M2 | TEMPERATURE: 97.8 F | SYSTOLIC BLOOD PRESSURE: 114 MMHG | OXYGEN SATURATION: 99 % | HEART RATE: 60 BPM | HEIGHT: 59 IN

## 2025-05-05 DIAGNOSIS — E03.9 ACQUIRED HYPOTHYROIDISM: ICD-10-CM

## 2025-05-05 DIAGNOSIS — M15.0 PRIMARY OSTEOARTHRITIS INVOLVING MULTIPLE JOINTS: ICD-10-CM

## 2025-05-05 DIAGNOSIS — E78.5 DYSLIPIDEMIA: Primary | ICD-10-CM

## 2025-05-05 DIAGNOSIS — K21.9 GASTROESOPHAGEAL REFLUX DISEASE WITHOUT ESOPHAGITIS: ICD-10-CM

## 2025-05-05 DIAGNOSIS — K58.9 IRRITABLE BOWEL SYNDROME, UNSPECIFIED TYPE: ICD-10-CM

## 2025-05-05 DIAGNOSIS — M47.812 SPONDYLOSIS OF CERVICAL REGION WITHOUT MYELOPATHY OR RADICULOPATHY: ICD-10-CM

## 2025-05-05 LAB
ALBUMIN SERPL-MCNC: 3.6 G/DL (ref 3.5–5)
ALBUMIN/GLOB SERPL: 1.2 (ref 1.1–2.2)
ALP SERPL-CCNC: 72 U/L (ref 45–117)
ALT SERPL-CCNC: 25 U/L (ref 12–78)
ANION GAP SERPL CALC-SCNC: 4 MMOL/L (ref 2–12)
AST SERPL-CCNC: 24 U/L (ref 15–37)
BILIRUB SERPL-MCNC: 0.6 MG/DL (ref 0.2–1)
BUN SERPL-MCNC: 22 MG/DL (ref 6–20)
BUN/CREAT SERPL: 23 (ref 12–20)
CALCIUM SERPL-MCNC: 10.1 MG/DL (ref 8.5–10.1)
CHLORIDE SERPL-SCNC: 109 MMOL/L (ref 97–108)
CHOLEST SERPL-MCNC: 251 MG/DL
CK SERPL-CCNC: 73 U/L (ref 26–192)
CO2 SERPL-SCNC: 27 MMOL/L (ref 21–32)
CREAT SERPL-MCNC: 0.94 MG/DL (ref 0.55–1.02)
GLOBULIN SER CALC-MCNC: 2.9 G/DL (ref 2–4)
GLUCOSE SERPL-MCNC: 88 MG/DL (ref 65–100)
HDLC SERPL-MCNC: 112 MG/DL
HDLC SERPL: 2.2 (ref 0–5)
LDLC SERPL CALC-MCNC: 118.6 MG/DL (ref 0–100)
POTASSIUM SERPL-SCNC: 4.5 MMOL/L (ref 3.5–5.1)
PROT SERPL-MCNC: 6.5 G/DL (ref 6.4–8.2)
SODIUM SERPL-SCNC: 140 MMOL/L (ref 136–145)
TRIGL SERPL-MCNC: 102 MG/DL
VLDLC SERPL CALC-MCNC: 20.4 MG/DL

## 2025-05-05 PROCEDURE — 1123F ACP DISCUSS/DSCN MKR DOCD: CPT | Performed by: INTERNAL MEDICINE

## 2025-05-05 PROCEDURE — 1160F RVW MEDS BY RX/DR IN RCRD: CPT | Performed by: INTERNAL MEDICINE

## 2025-05-05 PROCEDURE — 99214 OFFICE O/P EST MOD 30 MIN: CPT | Performed by: INTERNAL MEDICINE

## 2025-05-05 PROCEDURE — 1090F PRES/ABSN URINE INCON ASSESS: CPT | Performed by: INTERNAL MEDICINE

## 2025-05-05 PROCEDURE — 1036F TOBACCO NON-USER: CPT | Performed by: INTERNAL MEDICINE

## 2025-05-05 PROCEDURE — G8427 DOCREV CUR MEDS BY ELIG CLIN: HCPCS | Performed by: INTERNAL MEDICINE

## 2025-05-05 PROCEDURE — 1159F MED LIST DOCD IN RCRD: CPT | Performed by: INTERNAL MEDICINE

## 2025-05-05 PROCEDURE — G8420 CALC BMI NORM PARAMETERS: HCPCS | Performed by: INTERNAL MEDICINE

## 2025-05-05 PROCEDURE — 1126F AMNT PAIN NOTED NONE PRSNT: CPT | Performed by: INTERNAL MEDICINE

## 2025-05-05 ASSESSMENT — PATIENT HEALTH QUESTIONNAIRE - PHQ9
SUM OF ALL RESPONSES TO PHQ QUESTIONS 1-9: 0
2. FEELING DOWN, DEPRESSED OR HOPELESS: NOT AT ALL
SUM OF ALL RESPONSES TO PHQ QUESTIONS 1-9: 0
1. LITTLE INTEREST OR PLEASURE IN DOING THINGS: NOT AT ALL

## 2025-05-05 NOTE — PROGRESS NOTES
Merari Eden is a 85 y.o. female     Chief Complaint   Patient presents with    Hypothyroidism     6 month fup    osteoarthritis    Gastroesophageal Reflux       \"Have you been to the ER, urgent care clinic since your last visit?  Hospitalized since your last visit?\"    NO    “Have you seen or consulted any other health care providers outside of [facility] since your last visit?”    NO

## 2025-05-09 ENCOUNTER — RESULTS FOLLOW-UP (OUTPATIENT)
Facility: CLINIC | Age: 85
End: 2025-05-09

## 2025-05-19 ENCOUNTER — OFFICE VISIT (OUTPATIENT)
Facility: CLINIC | Age: 85
End: 2025-05-19
Payer: MEDICARE

## 2025-05-19 DIAGNOSIS — J06.9 ACUTE URI: Primary | ICD-10-CM

## 2025-05-19 PROCEDURE — 99213 OFFICE O/P EST LOW 20 MIN: CPT | Performed by: INTERNAL MEDICINE

## 2025-05-19 PROCEDURE — G8420 CALC BMI NORM PARAMETERS: HCPCS | Performed by: INTERNAL MEDICINE

## 2025-05-19 PROCEDURE — 1036F TOBACCO NON-USER: CPT | Performed by: INTERNAL MEDICINE

## 2025-05-19 PROCEDURE — 1160F RVW MEDS BY RX/DR IN RCRD: CPT | Performed by: INTERNAL MEDICINE

## 2025-05-19 PROCEDURE — 1123F ACP DISCUSS/DSCN MKR DOCD: CPT | Performed by: INTERNAL MEDICINE

## 2025-05-19 PROCEDURE — 1090F PRES/ABSN URINE INCON ASSESS: CPT | Performed by: INTERNAL MEDICINE

## 2025-05-19 PROCEDURE — 1159F MED LIST DOCD IN RCRD: CPT | Performed by: INTERNAL MEDICINE

## 2025-05-19 PROCEDURE — G8427 DOCREV CUR MEDS BY ELIG CLIN: HCPCS | Performed by: INTERNAL MEDICINE

## 2025-05-19 RX ORDER — AZITHROMYCIN 250 MG/1
TABLET, FILM COATED ORAL
Qty: 6 TABLET | Refills: 0 | Status: SHIPPED | OUTPATIENT
Start: 2025-05-19 | End: 2025-05-29

## 2025-05-19 RX ORDER — AZITHROMYCIN 250 MG/1
TABLET, FILM COATED ORAL
Qty: 6 TABLET | Refills: 0 | Status: SHIPPED | OUTPATIENT
Start: 2025-05-19 | End: 2025-05-19

## 2025-05-19 SDOH — ECONOMIC STABILITY: FOOD INSECURITY: WITHIN THE PAST 12 MONTHS, THE FOOD YOU BOUGHT JUST DIDN'T LAST AND YOU DIDN'T HAVE MONEY TO GET MORE.: NEVER TRUE

## 2025-05-19 SDOH — ECONOMIC STABILITY: FOOD INSECURITY: WITHIN THE PAST 12 MONTHS, YOU WORRIED THAT YOUR FOOD WOULD RUN OUT BEFORE YOU GOT MONEY TO BUY MORE.: NEVER TRUE

## 2025-05-27 RX ORDER — ESCITALOPRAM OXALATE 20 MG/1
20 TABLET ORAL DAILY
Qty: 90 TABLET | Refills: 1 | Status: SHIPPED | OUTPATIENT
Start: 2025-05-27

## 2025-05-27 NOTE — TELEPHONE ENCOUNTER
PCP: John Norman MD    Last appt: 5/19/2025    Future Appointments   Date Time Provider Department Center   11/7/2025  8:30 AM John Norman MD [facility] DEP       Requested Prescriptions     Pending Prescriptions Disp Refills    escitalopram (LEXAPRO) 20 MG tablet [Pharmacy Med Name: Escitalopram Oxalate 20 MG Oral Tablet] 90 tablet 1     Sig: Take 1 tablet by mouth once daily

## 2025-06-27 DIAGNOSIS — F41.9 ANXIETY: Primary | ICD-10-CM

## 2025-06-27 RX ORDER — LORAZEPAM 0.5 MG/1
0.5 TABLET ORAL EVERY 8 HOURS PRN
Qty: 30 TABLET | Refills: 0 | Status: SHIPPED | OUTPATIENT
Start: 2025-06-27 | End: 2025-07-27

## 2025-06-27 RX ORDER — ESCITALOPRAM OXALATE 20 MG/1
20 TABLET ORAL DAILY
Qty: 90 TABLET | Refills: 1 | Status: SHIPPED | OUTPATIENT
Start: 2025-06-27

## 2025-06-27 RX ORDER — LORAZEPAM 0.5 MG/1
0.5 TABLET ORAL EVERY 6 HOURS PRN
COMMUNITY
End: 2025-06-27 | Stop reason: SDUPTHER

## 2025-06-27 NOTE — TELEPHONE ENCOUNTER
RX refill request from the patient/pharmacy. Patient last seen 05/19/2025 without labs, and next appt. scheduled for 11/07/2025.   Requested Prescriptions     Pending Prescriptions Disp Refills    escitalopram (LEXAPRO) 20 MG tablet 90 tablet 1     Sig: Take 1 tablet by mouth daily    LORazepam (ATIVAN) 0.5 MG tablet 30 tablet 0     Sig: Take 1 tablet by mouth every 8 hours as needed for Anxiety for up to 30 days. Max Daily Amount: 1.5 mg

## 2025-06-27 NOTE — TELEPHONE ENCOUNTER
Patient is requesting refills for the following to be sent to [facility] on [address]     escitalopram (LEXAPRO) 20 MG tablet    LORazepam (ATIVAN) 0.5 MG tablet

## 2025-07-22 RX ORDER — PANTOPRAZOLE SODIUM 40 MG/1
40 TABLET, DELAYED RELEASE ORAL DAILY
Qty: 90 TABLET | Refills: 3 | Status: SHIPPED | OUTPATIENT
Start: 2025-07-22

## 2025-07-22 NOTE — TELEPHONE ENCOUNTER
RX refill request from the patient/pharmacy. Patient last seen 05- with labs, and next appt. scheduled for 11-1-2025  Requested Prescriptions     Pending Prescriptions Disp Refills    pantoprazole (PROTONIX) 40 MG tablet [Pharmacy Med Name: Pantoprazole Sodium 40 MG Oral Tablet Delayed Release] 90 tablet 3     Sig: Take 1 tablet by mouth once daily    .

## (undated) DEVICE — CATH IV AUTOGRD BC PNK 20GA 25 -- INSYTE

## (undated) DEVICE — SYR 10ML LUER LOK 1/5ML GRAD --

## (undated) DEVICE — BASIN EMSIS 16OZ GRAPHITE PLAS KID SHP MOLD GRAD FOR ORAL

## (undated) DEVICE — Device

## (undated) DEVICE — TIP SUCT TRNSPAR RIB SURF STD BLB RIG NVENT W/ 5IN1 CONN DYND50138] MEDLINE INDUSTRIES INC]

## (undated) DEVICE — SET ADMIN 16ML TBNG L100IN 2 Y INJ SITE IV PIGGY BK DISP

## (undated) DEVICE — ELECTRODE PT RET AD L9FT HI MOIST COND ADH HYDRGEL CORDED

## (undated) DEVICE — 1200 GUARD II KIT W/5MM TUBE W/O VAC TUBE: Brand: GUARDIAN

## (undated) DEVICE — IV START KIT: Brand: MEDLINE

## (undated) DEVICE — BAG SPEC BIOHZRD 10 X 10 IN --

## (undated) DEVICE — NEONATAL-ADULT SPO2 SENSOR: Brand: NELLCOR

## (undated) DEVICE — SOLIDIFIER MEDC 1200ML -- CONVERT TO 356117

## (undated) DEVICE — CONTAINER SPEC 20 ML LID NEUT BUFF FORMALIN 10 % POLYPR STS

## (undated) DEVICE — TOWEL 4 PLY TISS 19X30 SUE WHT

## (undated) DEVICE — SYRINGE 50ML E/T

## (undated) DEVICE — SYR 3ML LL TIP 1/10ML GRAD --

## (undated) DEVICE — CUFF BLD PRSS AD CLTH SGL TB W/ BAYNT CONN ROUNDED CORNER

## (undated) DEVICE — SINGLE-USE BIOPSY FORCEPS: Brand: RADIAL JAW 4

## (undated) DEVICE — NEEDLE HYPO 18GA L1.5IN PNK S STL HUB POLYPR SHLD REG BVL

## (undated) DEVICE — Z DISCONTINUED PER MEDLINE LINE GAS SAMPLING O2/CO2 LNG AD 13 FT NSL W/ TBNG FILTERLINE

## (undated) DEVICE — KENDALL RADIOLUCENT FOAM MONITORING ELECTRODE RECTANGULAR SHAPE: Brand: KENDALL

## (undated) DEVICE — FORCEPS BX L240CM JAW DIA2.8MM L CAP W/ NDL MIC MESH TOOTH

## (undated) DEVICE — ENDOSCOPIC KIT COMPLIANCE ENDOKIT